# Patient Record
Sex: FEMALE | Race: WHITE | NOT HISPANIC OR LATINO | Employment: UNEMPLOYED | URBAN - METROPOLITAN AREA
[De-identification: names, ages, dates, MRNs, and addresses within clinical notes are randomized per-mention and may not be internally consistent; named-entity substitution may affect disease eponyms.]

---

## 2023-04-28 PROBLEM — N32.81 OAB (OVERACTIVE BLADDER): Status: ACTIVE | Noted: 2023-04-28

## 2023-04-28 PROBLEM — K51.90 COLITIS, ULCERATIVE (MULTI): Status: ACTIVE | Noted: 2023-04-28

## 2023-04-28 PROBLEM — E11.9 CONTROLLED TYPE 2 DIABETES MELLITUS (MULTI): Status: ACTIVE | Noted: 2023-04-28

## 2023-04-28 PROBLEM — I10 HYPERTENSION, ACCELERATED: Status: ACTIVE | Noted: 2023-04-28

## 2023-04-28 PROBLEM — H26.9 CATARACT: Status: ACTIVE | Noted: 2023-04-28

## 2023-04-28 PROBLEM — I34.0 MITRAL VALVE REGURGITATION: Status: ACTIVE | Noted: 2023-04-28

## 2023-04-28 PROBLEM — F41.9 ANXIETY: Status: ACTIVE | Noted: 2023-04-28

## 2023-04-28 PROBLEM — I10 ESSENTIAL HYPERTENSION: Status: ACTIVE | Noted: 2023-04-28

## 2023-04-28 PROBLEM — I49.9 DYSRHYTHMIA: Status: ACTIVE | Noted: 2023-04-28

## 2023-04-28 PROBLEM — E78.5 HYPERLIPIDEMIA: Status: ACTIVE | Noted: 2023-04-28

## 2023-04-28 PROBLEM — F32.A MILD DEPRESSION: Status: ACTIVE | Noted: 2023-04-28

## 2023-04-28 PROBLEM — I35.0 AORTIC STENOSIS, MILD: Status: ACTIVE | Noted: 2023-04-28

## 2023-04-28 PROBLEM — I48.0 AF (PAROXYSMAL ATRIAL FIBRILLATION) (MULTI): Status: ACTIVE | Noted: 2023-04-28

## 2023-04-28 PROBLEM — L40.9 PSORIASIS: Status: ACTIVE | Noted: 2023-04-28

## 2023-04-28 PROBLEM — R42 DIZZINESS: Status: ACTIVE | Noted: 2023-04-28

## 2023-04-28 PROBLEM — J30.9 ALLERGIC RHINITIS: Status: ACTIVE | Noted: 2023-04-28

## 2023-04-28 RX ORDER — SERTRALINE HYDROCHLORIDE 25 MG/1
1 TABLET, FILM COATED ORAL DAILY
COMMUNITY
Start: 2022-09-27 | End: 2023-07-26 | Stop reason: ALTCHOICE

## 2023-04-28 RX ORDER — ENALAPRIL MALEATE 2.5 MG/1
1 TABLET ORAL DAILY
COMMUNITY
End: 2023-07-26 | Stop reason: ALTCHOICE

## 2023-04-28 RX ORDER — SULFAMETHOXAZOLE AND TRIMETHOPRIM 400; 80 MG/1; MG/1
TABLET ORAL EVERY 12 HOURS
COMMUNITY
End: 2023-10-09 | Stop reason: SDUPTHER

## 2023-04-28 RX ORDER — ASPIRIN 325 MG
1 TABLET ORAL DAILY
COMMUNITY
Start: 2022-03-08

## 2023-04-28 RX ORDER — MECLIZINE HYDROCHLORIDE 25 MG/1
TABLET ORAL
COMMUNITY
Start: 2020-01-21

## 2023-04-28 RX ORDER — LISINOPRIL 40 MG/1
1 TABLET ORAL DAILY
COMMUNITY
Start: 2020-01-21

## 2023-04-28 RX ORDER — METOPROLOL SUCCINATE 50 MG/1
1.5 TABLET, EXTENDED RELEASE ORAL DAILY
COMMUNITY
Start: 2022-03-01 | End: 2024-01-03 | Stop reason: SDUPTHER

## 2023-04-28 RX ORDER — FLUOXETINE 10 MG/1
1 TABLET ORAL DAILY
COMMUNITY
Start: 2022-11-29 | End: 2023-07-26 | Stop reason: ALTCHOICE

## 2023-04-28 RX ORDER — ERYTHROMYCIN 5 MG/G
OINTMENT OPHTHALMIC
COMMUNITY
Start: 2023-01-19 | End: 2023-10-09 | Stop reason: SDUPTHER

## 2023-04-28 RX ORDER — BETAMETHASONE DIPROPIONATE 0.5 MG/G
1 CREAM TOPICAL 2 TIMES DAILY
COMMUNITY
Start: 2021-08-16 | End: 2023-10-09 | Stop reason: SDUPTHER

## 2023-05-11 ENCOUNTER — APPOINTMENT (OUTPATIENT)
Dept: PRIMARY CARE | Facility: CLINIC | Age: 88
End: 2023-05-11
Payer: MEDICARE

## 2023-07-26 ENCOUNTER — OFFICE VISIT (OUTPATIENT)
Dept: PRIMARY CARE | Facility: CLINIC | Age: 88
End: 2023-07-26
Payer: MEDICARE

## 2023-07-26 VITALS
WEIGHT: 136 LBS | BODY MASS INDEX: 25.68 KG/M2 | HEIGHT: 61 IN | HEART RATE: 87 BPM | TEMPERATURE: 93.5 F | OXYGEN SATURATION: 93 % | SYSTOLIC BLOOD PRESSURE: 138 MMHG | DIASTOLIC BLOOD PRESSURE: 75 MMHG

## 2023-07-26 DIAGNOSIS — E78.2 MIXED HYPERLIPIDEMIA: ICD-10-CM

## 2023-07-26 DIAGNOSIS — F32.A MILD DEPRESSION: ICD-10-CM

## 2023-07-26 DIAGNOSIS — I10 ESSENTIAL HYPERTENSION: ICD-10-CM

## 2023-07-26 DIAGNOSIS — I48.0 AF (PAROXYSMAL ATRIAL FIBRILLATION) (MULTI): Primary | ICD-10-CM

## 2023-07-26 DIAGNOSIS — K51.919 ULCERATIVE COLITIS WITH COMPLICATION, UNSPECIFIED LOCATION (MULTI): ICD-10-CM

## 2023-07-26 DIAGNOSIS — N32.81 OAB (OVERACTIVE BLADDER): ICD-10-CM

## 2023-07-26 PROCEDURE — 99214 OFFICE O/P EST MOD 30 MIN: CPT | Performed by: INTERNAL MEDICINE

## 2023-07-26 PROCEDURE — 1036F TOBACCO NON-USER: CPT | Performed by: INTERNAL MEDICINE

## 2023-07-26 PROCEDURE — 1159F MED LIST DOCD IN RCRD: CPT | Performed by: INTERNAL MEDICINE

## 2023-07-26 PROCEDURE — 1157F ADVNC CARE PLAN IN RCRD: CPT | Performed by: INTERNAL MEDICINE

## 2023-07-26 PROCEDURE — 1126F AMNT PAIN NOTED NONE PRSNT: CPT | Performed by: INTERNAL MEDICINE

## 2023-07-26 PROCEDURE — 3078F DIAST BP <80 MM HG: CPT | Performed by: INTERNAL MEDICINE

## 2023-07-26 PROCEDURE — 3075F SYST BP GE 130 - 139MM HG: CPT | Performed by: INTERNAL MEDICINE

## 2023-07-26 SDOH — ECONOMIC STABILITY: FOOD INSECURITY: WITHIN THE PAST 12 MONTHS, YOU WORRIED THAT YOUR FOOD WOULD RUN OUT BEFORE YOU GOT MONEY TO BUY MORE.: NEVER TRUE

## 2023-07-26 SDOH — ECONOMIC STABILITY: FOOD INSECURITY: WITHIN THE PAST 12 MONTHS, THE FOOD YOU BOUGHT JUST DIDN'T LAST AND YOU DIDN'T HAVE MONEY TO GET MORE.: NEVER TRUE

## 2023-07-26 ASSESSMENT — PATIENT HEALTH QUESTIONNAIRE - PHQ9
1. LITTLE INTEREST OR PLEASURE IN DOING THINGS: NOT AT ALL
2. FEELING DOWN, DEPRESSED OR HOPELESS: NOT AT ALL
SUM OF ALL RESPONSES TO PHQ9 QUESTIONS 1 & 2: 0

## 2023-07-26 ASSESSMENT — LIFESTYLE VARIABLES
SKIP TO QUESTIONS 9-10: 1
HOW OFTEN DO YOU HAVE SIX OR MORE DRINKS ON ONE OCCASION: NEVER
AUDIT-C TOTAL SCORE: 0
HOW OFTEN DO YOU HAVE A DRINK CONTAINING ALCOHOL: NEVER
HOW MANY STANDARD DRINKS CONTAINING ALCOHOL DO YOU HAVE ON A TYPICAL DAY: PATIENT DOES NOT DRINK

## 2023-07-26 ASSESSMENT — PAIN SCALES - GENERAL: PAINLEVEL: 0-NO PAIN

## 2023-07-26 ASSESSMENT — ENCOUNTER SYMPTOMS
OCCASIONAL FEELINGS OF UNSTEADINESS: 1
DEPRESSION: 0
LOSS OF SENSATION IN FEET: 0

## 2023-07-26 NOTE — PROGRESS NOTES
Subjective   Patient ID: Danika Demarco is a 89 y.o. female who presents for Follow-up (New pt to dr abraham).    HPI    Patient presents for a follow up. She was previously a patient of Dr. Gaines, last seen November 2022. Most recent lab work done April 2023.     HTN: BP in the office today is 138/75. Takes lisinopril 40 mg daily and metoprolol 75 mg daily.  She reports that she does check her blood pressure at home, but it normally runs in a pretty good range.     Atrial fibrillation: Takes metoprolol 75 mg. Last saw Dr. Ashby in October 2022, is due to follow up with him in October. She did have an episode of atrial fibrillation back in April which prompted an emergency room visit but no hospital admission. She takes a full dose ASA.     Hyperlipidemia: Stable, eats a lot of vegetables and salads. Most recent lipid panel was in August 2022, and everything was within normal limits at that time.    Overactive bladder: She did have mesh placed by Dr. Bliss over two decades ago, which worked well for awhile, but she is now soaking through pads consistently now. She is planning on asking Dr. Bliss if she can have a revision of her procedure. Patient has bladder leakage.     Depression, mild: Last visit, the patient switched from sertraline to fluoxetine 10 mg. She started having hot flashes with this medication as well, so she discontinued the medication. She is trying to manage her mood on her own, and is doing pretty well overall.      Review of Systems  In addition to that documented in the HPI above, the additional ROS was obtained:  Constitutional: Denies fevers or chills  Eyes: Denies vision changes  ENMT: Denies trouble swallowing, she gets plugged ears at times  Cardiovascular: Denies chest pain or heart racing  Respiratory: Denies SOB, does have an occasional cough usually associated with allergies or the smoke in the air  Gastrointestinal: Denies nausea, vomiting, diarrhea or  "constipation  Musculoskeletal: Denies joint pain or joint swelling  Neuro: denies frequent headaches, some dizziness when she looks up but this quickly resolves  Psych: denies anxiety, does have some depression, she is doing OK. She no longer takes med therapy.    Objective     Visit Vitals  /75 (BP Location: Right arm, Patient Position: Sitting, BP Cuff Size: Adult)   Pulse 87   Temp 34.2 °C (93.5 °F) (Temporal)   Ht 1.549 m (5' 1\")   Wt 61.7 kg (136 lb)   SpO2 93%   BMI 25.70 kg/m²   Smoking Status Never   BSA 1.63 m²      Physical Exam  Constitutional: Well developed, awake/alert/oriented x3, no distress, alert and   cooperative   Respiratory/Thorax: Patent airways, CTAB, normal breath sounds with good chest   expansion, thorax symmetric   Cardiovascular: irregularly, irregular rate and rhythm, rate is controlled, a 2/6 systolic murmur is noted over the LUSB, radiating to the carotids,  Musculoskeletal: ROM intact, no joint swelling, normal strength   Extremities: normal extremities, no cyanosis, no  edema, contusions or wounds, no   clubbing   Psychological: Appropriate mood and behavior     Health Maintenance Due   Topic Date Due    Vitamin B-12  Never done    Bone Density Scan  Never done    TB Test  Never done    Vitamin D25-OH  Never done    Hepatitis B Surface Antibody  Never done    Diabetes: Foot Exam  Never done    Diabetes: Retinopathy Screening  Never done    DTaP/Tdap/Td Vaccines (1 - Tdap) Never done    Zoster Vaccines (1 of 2) Never done    Pneumococcal Vaccine: 65+ Years (2 - PPSV23 if available, else PCV20) 12/05/2018    COVID-19 Vaccine (4 - Booster for Moderna series) 02/11/2022    Diabetes: Urine Protein Screening  08/05/2022    Diabetes: Hemoglobin A1C  11/12/2022    Lipid Panel  08/12/2023        Assessment/Plan   Problem List Items Addressed This Visit       AF (paroxysmal atrial fibrillation) (CMS/[facility]) - Primary     Follows up with cardiology, due for visit in October. Takes " metoprolol. She takes ASA also since she has a history of brain bleed, she does not take other anticoagulation therapy         Relevant Orders    TSH with reflex to Free T4 if abnormal    Vitamin D 1,25 Dihydroxy    Comprehensive Metabolic Panel    CBC and Auto Differential    Essential hypertension     Stable, continue with lisinopril and metoprolol         Relevant Orders    Albumin , Urine Random    Vitamin D 1,25 Dihydroxy    Comprehensive Metabolic Panel    CBC and Auto Differential    Hyperlipidemia     Stable, well controlled with diet. Recheck labs.          Relevant Orders    Vitamin D 1,25 Dihydroxy    Comprehensive Metabolic Panel    CBC and Auto Differential    Lipid panel    Mild depression     Could not tolerate fluoxetine so she discontinued this medication and elected to manage her mood on her own.          Relevant Orders    Vitamin D 1,25 Dihydroxy    Comprehensive Metabolic Panel    CBC and Auto Differential    OAB (overactive bladder)     Did undergo mesh placement in the past, plans on returning to that physician to discuss revision , will refer to uro gynecology for an assessment         Relevant Orders    Referral to Urogynecology    Vitamin D 1,25 Dihydroxy    Comprehensive Metabolic Panel    CBC and Auto Differential     Recheck labs. Follow up in 6 months.

## 2023-07-26 NOTE — ASSESSMENT & PLAN NOTE
Follows up with cardiology, due for visit in October. Takes metoprolol. She takes ASA also since she has a history of brain bleed, she does not take other anticoagulation therapy

## 2023-07-26 NOTE — ASSESSMENT & PLAN NOTE
Could not tolerate fluoxetine so she discontinued this medication and elected to manage her mood on her own.

## 2023-07-26 NOTE — ASSESSMENT & PLAN NOTE
Did undergo mesh placement in the past, plans on returning to that physician to discuss revision , will refer to uro gynecology for an assessment

## 2023-08-17 ENCOUNTER — TELEPHONE (OUTPATIENT)
Dept: PRIMARY CARE | Facility: CLINIC | Age: 88
End: 2023-08-17
Payer: MEDICARE

## 2023-08-17 NOTE — TELEPHONE ENCOUNTER
Patient is calling for a handicap placard script.  Patient is asking if it can be mailed to her home address.

## 2023-08-31 PROBLEM — D18.01 HEMANGIOMA OF SKIN AND SUBCUTANEOUS TISSUE: Status: ACTIVE | Noted: 2019-06-13

## 2023-08-31 PROBLEM — L71.9 ROSACEA, UNSPECIFIED: Status: ACTIVE | Noted: 2019-06-13

## 2023-08-31 PROBLEM — L30.0 NUMMULAR DERMATITIS: Status: ACTIVE | Noted: 2019-06-13

## 2023-08-31 PROBLEM — L82.1 OTHER SEBORRHEIC KERATOSIS: Status: ACTIVE | Noted: 2019-06-13

## 2023-08-31 RX ORDER — METOPROLOL SUCCINATE 25 MG/1
25 TABLET, EXTENDED RELEASE ORAL DAILY
COMMUNITY
Start: 2023-08-10 | End: 2023-10-09 | Stop reason: SDUPTHER

## 2023-08-31 RX ORDER — MINOCYCLINE HYDROCHLORIDE 50 MG/1
50 CAPSULE ORAL
COMMUNITY
Start: 2016-11-14 | End: 2024-01-29 | Stop reason: WASHOUT

## 2023-09-29 LAB — URINE CULTURE: ABNORMAL

## 2023-10-03 NOTE — PATIENT INSTRUCTIONS
Continue all current medications as prescribed. Refills have been sent to your pharmacy as per your request.  Followup with Dr. Ashby in 1 year, sooner should any issues or concerns arise before then.     If you have any questions or cardiac concerns, please call our office at [phone].

## 2023-10-03 NOTE — PROGRESS NOTES
"Counseling:  The patient was counseled regarding diagnostic results, instructions for management, risk factor reductions, prognosis, patient and family education, impressions, risks and benefits of treatment options and importance of compliance with treatment.      Chief Complaint:   The patient presents today for 6-month followup of a-fib.      History Of Present Illness:    Danika Demarco is a 89 year old female patient who presents today for 6-month followup of a-fib. Her PMH is significant for anxiety, mild aortic stenosis, ulcerative colitis, DM type 2, HTN, hyperlipidemia, atrial fibrillation, and h/o traumatic subdural hematoma after a fall. Over the past 6 months, the patient states that she has done well from a cardiac standpoint. She denies any CP, chest discomfort, SOB or palpitations. Her only complaint is that of age-related fatigue/tiredness. The patient is compliant with her prescribed medications.      Last Recorded Vitals:  Vitals:    10/09/23 1431   BP: 120/78   BP Location: Right arm   Pulse: 78   Weight: 63.5 kg (140 lb)   Height: 1.575 m (5' 2\")     Past Surgical History:  She has a past surgical history that includes Other surgical history (01/21/2020); Other surgical history (01/21/2020); Other surgical history (01/21/2020); Other surgical history (01/21/2020); Other surgical history (01/21/2020); Other surgical history (01/21/2020); and Other surgical history (01/21/2020).      Social History:  She reports that she has never smoked. She has never used smokeless tobacco. She reports that she does not drink alcohol and does not use drugs.    Family History:  Family History   Problem Relation Name Age of Onset    Breast cancer Sister      Diabetes Brother      Prostate cancer Brother         Allergies:  Patient has no known allergies.    Outpatient Medications:  Current Outpatient Medications   Medication Instructions    aspirin 325 mg tablet 1 tablet, oral, Daily    lisinopril 40 mg tablet 1 tablet, " oral, Daily    meclizine (Antivert) 25 mg tablet oral    metoprolol succinate XL (Toprol-XL) 50 mg 24 hr tablet 1.5 tablets, oral, Daily    metoprolol succinate XL (TOPROL-XL) 25 mg, oral, Daily    minocycline 50 mg, oral    vibegron 75 mg, oral, Daily PRN     Review of Systems   Constitutional: Positive for malaise/fatigue.   All other systems reviewed and are negative.     Physical Exam:  Constitutional:       Appearance: Healthy appearance. Not in distress.   Neck:      Vascular: No JVR. JVD normal.   Pulmonary:      Effort: Pulmonary effort is normal.      Breath sounds: Normal breath sounds. No wheezing. No rhonchi. No rales.   Chest:      Chest wall: Not tender to palpatation.   Cardiovascular:      PMI at left midclavicular line. Normal rate. Regular rhythm. Normal S1. Normal S2.       Murmurs: There is no murmur.      No gallop.  No click. No rub.   Pulses:     Intact distal pulses.   Edema:     Peripheral edema absent.   Abdominal:      General: Bowel sounds are normal.      Palpations: Abdomen is soft.      Tenderness: There is no abdominal tenderness.   Musculoskeletal: Normal range of motion.         General: No tenderness. Skin:     General: Skin is warm and dry.   Neurological:      General: No focal deficit present.      Mental Status: Alert and oriented to person, place and time.       Last Labs:  CBC -  Lab Results   Component Value Date    WBC 8.2 04/19/2023    HGB 14.2 04/19/2023    HCT 42.3 04/19/2023    MCV 93 04/19/2023     04/19/2023       CMP -  Lab Results   Component Value Date    CALCIUM 9.3 04/19/2023    PROT 7.4 04/19/2023    ALBUMIN 3.8 04/19/2023    AST 25 04/19/2023    ALT 10 04/19/2023    ALKPHOS 53 04/19/2023    BILITOT 0.9 04/19/2023       LIPID PANEL -   Lab Results   Component Value Date    CHOL 123 08/12/2022    TRIG 131 08/12/2022    HDL 41.1 08/12/2022    CHHDL 3.0 08/12/2022    LDLF 56 08/12/2022    VLDL 26 08/12/2022    NHDL 116 08/05/2020       RENAL FUNCTION PANEL -    Lab Results   Component Value Date    GLUCOSE 130 (H) 04/19/2023     04/19/2023    K 4.5 04/19/2023     04/19/2023    CO2 22 04/19/2023    ANIONGAP 16 04/19/2023    BUN 17 04/19/2023    CREATININE 0.72 04/19/2023    CALCIUM 9.3 04/19/2023    ALBUMIN 3.8 04/19/2023        Lab Results   Component Value Date     (H) 04/19/2023    HGBA1C 6.3 (A) 08/12/2022       Last Cardiology Tests:  05/11/2023 - TTE  1. Left ventricular systolic function is normal with a 60-65% estimated ejection fraction.  2. There is reduced right ventricular systolic function.  3. The left atrium is severely dilated.  4. Moderate mitral valve regurgitation.  5. Mildly elevated RVSP.  6. Moderate tricuspid regurgitation.  7. Mild aortic valve stenosis.    05/05/2022 - TTE  1. The left ventricular systolic function is normal with a 60-65% estimated ejection fraction.  2. There is moderate concentric left ventricular hypertrophy.  3. The left atrium is severely dilated.  4. Mild to moderate mitral valve regurgitation.  5. Mildly elevated RVSP.  6. Mild aortic valve stenosis.  7. There is moderate aortic valve cusp calcification.     Assessment/Plan   1) Atrial Fibrillation  Recently in the ED due to A. fib with RVR and heart rate improved after one-time dose of metoprolol  Not on anticoagulation due to history of subdural hematoma  Continue metoprolol succinate 75 mg daily   D/W patient about risks of stroke vs subdural hematoma  Wishes to remain off anticoagulation for now  Plan is for rate control approach  Stable    2) Aortic valve stenosis  Mild AS on TTE May 2022  Mild AS on TTE May 2023      3) Mitral valve regurgitation  Mild to moderate MR on TTE May 2022  Moderate MR on TTE May 2023     4) HTN  Stable  Continue current antihypertensives: lisinopril 40 mg daily, metoprolol succinate 75 mg   Script previously given for new BP cuff      Scribe Attestation  By signing my name below, I, Karly Ott   attest that  this documentation has been prepared under the direction and in the presence of Milton Ashby MD.

## 2023-10-09 ENCOUNTER — OFFICE VISIT (OUTPATIENT)
Dept: CARDIOLOGY | Facility: CLINIC | Age: 88
End: 2023-10-09
Payer: MEDICARE

## 2023-10-09 VITALS
WEIGHT: 140 LBS | HEART RATE: 78 BPM | SYSTOLIC BLOOD PRESSURE: 120 MMHG | HEIGHT: 62 IN | DIASTOLIC BLOOD PRESSURE: 78 MMHG | BODY MASS INDEX: 25.76 KG/M2

## 2023-10-09 DIAGNOSIS — I48.0 AF (PAROXYSMAL ATRIAL FIBRILLATION) (MULTI): Primary | ICD-10-CM

## 2023-10-09 PROCEDURE — 3074F SYST BP LT 130 MM HG: CPT | Performed by: INTERNAL MEDICINE

## 2023-10-09 PROCEDURE — 1159F MED LIST DOCD IN RCRD: CPT | Performed by: INTERNAL MEDICINE

## 2023-10-09 PROCEDURE — 1126F AMNT PAIN NOTED NONE PRSNT: CPT | Performed by: INTERNAL MEDICINE

## 2023-10-09 PROCEDURE — 1036F TOBACCO NON-USER: CPT | Performed by: INTERNAL MEDICINE

## 2023-10-09 PROCEDURE — 1160F RVW MEDS BY RX/DR IN RCRD: CPT | Performed by: INTERNAL MEDICINE

## 2023-10-09 PROCEDURE — 99212 OFFICE O/P EST SF 10 MIN: CPT | Performed by: INTERNAL MEDICINE

## 2023-10-09 PROCEDURE — 3078F DIAST BP <80 MM HG: CPT | Performed by: INTERNAL MEDICINE

## 2023-10-09 PROCEDURE — 93000 ELECTROCARDIOGRAM COMPLETE: CPT | Performed by: INTERNAL MEDICINE

## 2023-10-09 RX ORDER — METOPROLOL SUCCINATE 25 MG/1
25 TABLET, EXTENDED RELEASE ORAL DAILY
Qty: 90 TABLET | Refills: 3 | Status: SHIPPED | OUTPATIENT
Start: 2023-10-09 | End: 2024-01-03 | Stop reason: SDUPTHER

## 2023-10-09 ASSESSMENT — ENCOUNTER SYMPTOMS
OCCASIONAL FEELINGS OF UNSTEADINESS: 0
LOSS OF SENSATION IN FEET: 0
DEPRESSION: 0

## 2023-10-09 NOTE — LETTER
"October 9, 2023     David Mcdonald MD  [address] [facility] [address]  [facility] [address]    Patient: Danika Demarco   YOB: 1930   Date of Visit: 10/9/2023       Dear Dr. David Mcdonald MD:    Thank you for referring Danika Demarco to me for evaluation. Below are my notes for this consultation.  If you have questions, please do not hesitate to call me. I look forward to following your patient along with you.       Sincerely,     Milton Ashby MD      CC: No Recipients  ______________________________________________________________________________________    Counseling:  The patient was counseled regarding diagnostic results, instructions for management, risk factor reductions, prognosis, patient and family education, impressions, risks and benefits of treatment options and importance of compliance with treatment.      Chief Complaint:   The patient presents today for 6-month followup of a-fib.      History Of Present Illness:    Danika Demarco is a 89 year old female patient who presents today for 6-month followup of a-fib. Her PMH is significant for anxiety, mild aortic stenosis, ulcerative colitis, DM type 2, HTN, hyperlipidemia, atrial fibrillation, and h/o traumatic subdural hematoma after a fall. Over the past 6 months, the patient states that she has done well from a cardiac standpoint. She denies any CP, chest discomfort, SOB or palpitations. Her only complaint is that of age-related fatigue/tiredness. The patient is compliant with her prescribed medications.      Last Recorded Vitals:  Vitals:    10/09/23 1431   BP: 120/78   BP Location: Right arm   Pulse: 78   Weight: 63.5 kg (140 lb)   Height: 1.575 m (5' 2\")     Past Surgical History:  She has a past surgical history that includes Other surgical history (01/21/2020); Other surgical history (01/21/2020); Other surgical history (01/21/2020); Other surgical history (01/21/2020); Other surgical history (01/21/2020); Other surgical history (01/21/2020); and " Other surgical history (01/21/2020).      Social History:  She reports that she has never smoked. She has never used smokeless tobacco. She reports that she does not drink alcohol and does not use drugs.    Family History:  Family History   Problem Relation Name Age of Onset   • Breast cancer Sister     • Diabetes Brother     • Prostate cancer Brother         Allergies:  Patient has no known allergies.    Outpatient Medications:  Current Outpatient Medications   Medication Instructions   • aspirin 325 mg tablet 1 tablet, oral, Daily   • lisinopril 40 mg tablet 1 tablet, oral, Daily   • meclizine (Antivert) 25 mg tablet oral   • metoprolol succinate XL (Toprol-XL) 50 mg 24 hr tablet 1.5 tablets, oral, Daily   • metoprolol succinate XL (TOPROL-XL) 25 mg, oral, Daily   • minocycline 50 mg, oral   • vibegron 75 mg, oral, Daily PRN     Review of Systems   Constitutional: Positive for malaise/fatigue.   All other systems reviewed and are negative.     Physical Exam:  Constitutional:       Appearance: Healthy appearance. Not in distress.   Neck:      Vascular: No JVR. JVD normal.   Pulmonary:      Effort: Pulmonary effort is normal.      Breath sounds: Normal breath sounds. No wheezing. No rhonchi. No rales.   Chest:      Chest wall: Not tender to palpatation.   Cardiovascular:      PMI at left midclavicular line. Normal rate. Regular rhythm. Normal S1. Normal S2.       Murmurs: There is no murmur.      No gallop.  No click. No rub.   Pulses:     Intact distal pulses.   Edema:     Peripheral edema absent.   Abdominal:      General: Bowel sounds are normal.      Palpations: Abdomen is soft.      Tenderness: There is no abdominal tenderness.   Musculoskeletal: Normal range of motion.         General: No tenderness. Skin:     General: Skin is warm and dry.   Neurological:      General: No focal deficit present.      Mental Status: Alert and oriented to person, place and time.       Last Labs:  CBC -  Lab Results   Component  Value Date    WBC 8.2 04/19/2023    HGB 14.2 04/19/2023    HCT 42.3 04/19/2023    MCV 93 04/19/2023     04/19/2023       CMP -  Lab Results   Component Value Date    CALCIUM 9.3 04/19/2023    PROT 7.4 04/19/2023    ALBUMIN 3.8 04/19/2023    AST 25 04/19/2023    ALT 10 04/19/2023    ALKPHOS 53 04/19/2023    BILITOT 0.9 04/19/2023       LIPID PANEL -   Lab Results   Component Value Date    CHOL 123 08/12/2022    TRIG 131 08/12/2022    HDL 41.1 08/12/2022    CHHDL 3.0 08/12/2022    LDLF 56 08/12/2022    VLDL 26 08/12/2022    NHDL 116 08/05/2020       RENAL FUNCTION PANEL -   Lab Results   Component Value Date    GLUCOSE 130 (H) 04/19/2023     04/19/2023    K 4.5 04/19/2023     04/19/2023    CO2 22 04/19/2023    ANIONGAP 16 04/19/2023    BUN 17 04/19/2023    CREATININE 0.72 04/19/2023    CALCIUM 9.3 04/19/2023    ALBUMIN 3.8 04/19/2023        Lab Results   Component Value Date     (H) 04/19/2023    HGBA1C 6.3 (A) 08/12/2022       Last Cardiology Tests:  05/11/2023 - TTE  1. Left ventricular systolic function is normal with a 60-65% estimated ejection fraction.  2. There is reduced right ventricular systolic function.  3. The left atrium is severely dilated.  4. Moderate mitral valve regurgitation.  5. Mildly elevated RVSP.  6. Moderate tricuspid regurgitation.  7. Mild aortic valve stenosis.    05/05/2022 - TTE  1. The left ventricular systolic function is normal with a 60-65% estimated ejection fraction.  2. There is moderate concentric left ventricular hypertrophy.  3. The left atrium is severely dilated.  4. Mild to moderate mitral valve regurgitation.  5. Mildly elevated RVSP.  6. Mild aortic valve stenosis.  7. There is moderate aortic valve cusp calcification.     Assessment/Plan  1) Atrial Fibrillation  Recently in the ED due to A. fib with RVR and heart rate improved after one-time dose of metoprolol  Not on anticoagulation due to history of subdural hematoma  Continue metoprolol  succinate 75 mg daily   D/W patient about risks of stroke vs subdural hematoma  Wishes to remain off anticoagulation for now  Plan is for rate control approach  Stable    2) Aortic valve stenosis  Mild AS on TTE May 2022  Mild AS on TTE May 2023      3) Mitral valve regurgitation  Mild to moderate MR on TTE May 2022  Moderate MR on TTE May 2023     4) HTN  Stable  Continue current antihypertensives: lisinopril 40 mg daily, metoprolol succinate 75 mg   Script previously given for new BP cuff      Scribe Attestation  By signing my name below, I, Karly Ott   attest that this documentation has been prepared under the direction and in the presence of Milton Ashby MD.

## 2023-10-18 ENCOUNTER — LAB (OUTPATIENT)
Dept: LAB | Facility: LAB | Age: 88
End: 2023-10-18
Payer: MEDICARE

## 2023-10-18 DIAGNOSIS — F32.A MILD DEPRESSION: ICD-10-CM

## 2023-10-18 DIAGNOSIS — K51.919 ULCERATIVE COLITIS WITH COMPLICATION, UNSPECIFIED LOCATION (MULTI): ICD-10-CM

## 2023-10-18 DIAGNOSIS — N32.81 OAB (OVERACTIVE BLADDER): ICD-10-CM

## 2023-10-18 DIAGNOSIS — I10 ESSENTIAL HYPERTENSION: ICD-10-CM

## 2023-10-18 DIAGNOSIS — E78.2 MIXED HYPERLIPIDEMIA: ICD-10-CM

## 2023-10-18 DIAGNOSIS — I48.0 AF (PAROXYSMAL ATRIAL FIBRILLATION) (MULTI): ICD-10-CM

## 2023-10-18 LAB
ALBUMIN SERPL BCP-MCNC: 3.8 G/DL (ref 3.4–5)
ALP SERPL-CCNC: 60 U/L (ref 33–136)
ALT SERPL W P-5'-P-CCNC: 11 U/L (ref 7–45)
ANION GAP SERPL CALC-SCNC: 12 MMOL/L (ref 10–20)
AST SERPL W P-5'-P-CCNC: 18 U/L (ref 9–39)
BASOPHILS # BLD AUTO: 0.06 X10*3/UL (ref 0–0.1)
BASOPHILS NFR BLD AUTO: 0.7 %
BILIRUB SERPL-MCNC: 0.9 MG/DL (ref 0–1.2)
BUN SERPL-MCNC: 19 MG/DL (ref 6–23)
CALCIUM SERPL-MCNC: 9.4 MG/DL (ref 8.6–10.3)
CHLORIDE SERPL-SCNC: 106 MMOL/L (ref 98–107)
CHOLEST SERPL-MCNC: 136 MG/DL (ref 0–199)
CHOLESTEROL/HDL RATIO: 3.3
CO2 SERPL-SCNC: 26 MMOL/L (ref 21–32)
CREAT SERPL-MCNC: 0.74 MG/DL (ref 0.5–1.05)
EOSINOPHIL # BLD AUTO: 0.21 X10*3/UL (ref 0–0.4)
EOSINOPHIL NFR BLD AUTO: 2.3 %
ERYTHROCYTE [DISTWIDTH] IN BLOOD BY AUTOMATED COUNT: 14.2 % (ref 11.5–14.5)
GFR SERPL CREATININE-BSD FRML MDRD: 76 ML/MIN/1.73M*2
GLUCOSE SERPL-MCNC: 123 MG/DL (ref 74–99)
HCT VFR BLD AUTO: 47.2 % (ref 36–46)
HDLC SERPL-MCNC: 40.8 MG/DL
HGB BLD-MCNC: 15 G/DL (ref 12–16)
IMM GRANULOCYTES # BLD AUTO: 0.03 X10*3/UL (ref 0–0.5)
IMM GRANULOCYTES NFR BLD AUTO: 0.3 % (ref 0–0.9)
LDLC SERPL CALC-MCNC: 54 MG/DL
LYMPHOCYTES # BLD AUTO: 2.59 X10*3/UL (ref 0.8–3)
LYMPHOCYTES NFR BLD AUTO: 28.3 %
MCH RBC QN AUTO: 30.3 PG (ref 26–34)
MCHC RBC AUTO-ENTMCNC: 31.8 G/DL (ref 32–36)
MCV RBC AUTO: 95 FL (ref 80–100)
MONOCYTES # BLD AUTO: 0.7 X10*3/UL (ref 0.05–0.8)
MONOCYTES NFR BLD AUTO: 7.7 %
NEUTROPHILS # BLD AUTO: 5.55 X10*3/UL (ref 1.6–5.5)
NEUTROPHILS NFR BLD AUTO: 60.7 %
NON HDL CHOLESTEROL: 95 MG/DL (ref 0–149)
NRBC BLD-RTO: 0 /100 WBCS (ref 0–0)
PLATELET # BLD AUTO: 307 X10*3/UL (ref 150–450)
PMV BLD AUTO: 10.7 FL (ref 7.5–11.5)
POTASSIUM SERPL-SCNC: 3.9 MMOL/L (ref 3.5–5.3)
PROT SERPL-MCNC: 7.1 G/DL (ref 6.4–8.2)
RBC # BLD AUTO: 4.95 X10*6/UL (ref 4–5.2)
SODIUM SERPL-SCNC: 140 MMOL/L (ref 136–145)
TRIGL SERPL-MCNC: 208 MG/DL (ref 0–149)
TSH SERPL-ACNC: 3.93 MIU/L (ref 0.44–3.98)
VIT B12 SERPL-MCNC: 282 PG/ML (ref 211–911)
VLDL: 42 MG/DL (ref 0–40)
WBC # BLD AUTO: 9.1 X10*3/UL (ref 4.4–11.3)

## 2023-10-18 PROCEDURE — 80053 COMPREHEN METABOLIC PANEL: CPT

## 2023-10-18 PROCEDURE — 36415 COLL VENOUS BLD VENIPUNCTURE: CPT

## 2023-10-18 PROCEDURE — 82652 VIT D 1 25-DIHYDROXY: CPT

## 2023-10-18 PROCEDURE — 82607 VITAMIN B-12: CPT

## 2023-10-18 PROCEDURE — 85025 COMPLETE CBC W/AUTO DIFF WBC: CPT

## 2023-10-18 PROCEDURE — 80061 LIPID PANEL: CPT

## 2023-10-18 PROCEDURE — 84443 ASSAY THYROID STIM HORMONE: CPT

## 2023-10-19 LAB — 1,25(OH)2D3 SERPL-MCNC: 50.4 PG/ML (ref 19.9–79.3)

## 2023-12-19 ENCOUNTER — TELEMEDICINE (OUTPATIENT)
Dept: UROLOGY | Facility: CLINIC | Age: 88
End: 2023-12-19
Payer: MEDICARE

## 2023-12-19 DIAGNOSIS — N32.81 OAB (OVERACTIVE BLADDER): Primary | ICD-10-CM

## 2023-12-19 DIAGNOSIS — N39.0 RECURRENT UTI: ICD-10-CM

## 2023-12-19 PROCEDURE — 99442 PR PHYS/QHP TELEPHONE EVALUATION 11-20 MIN: CPT | Performed by: STUDENT IN AN ORGANIZED HEALTH CARE EDUCATION/TRAINING PROGRAM

## 2023-12-30 ENCOUNTER — DOCUMENTATION (OUTPATIENT)
Dept: CARDIOLOGY | Facility: HOSPITAL | Age: 88
End: 2023-12-30
Payer: MEDICARE

## 2023-12-30 NOTE — PROGRESS NOTES
Rec'd page from answering service.  Patient ran out of 50mg metoprolol pills she borrowed from a friend.  Tells me she is normally supposed to take metoprolol succinate 75mg daily - this was verified per documentation in Dr. Ashby's note 10/9/23.      I instructed her to instead take 3 pills of the 25mg metoprolol succinate to make 75mg daily, and to call office on Tuesday to discuss further - she appears to be borrowing meds from friends and was considering borrowing metoprolol from her  - I believe a more in depth discussion and explanation of med administration is required for this patient.

## 2024-01-03 ENCOUNTER — DOCUMENTATION (OUTPATIENT)
Dept: UROLOGY | Facility: CLINIC | Age: 89
End: 2024-01-03
Payer: MEDICARE

## 2024-01-03 DIAGNOSIS — I48.0 AF (PAROXYSMAL ATRIAL FIBRILLATION) (MULTI): ICD-10-CM

## 2024-01-03 RX ORDER — METOPROLOL SUCCINATE 25 MG/1
25 TABLET, EXTENDED RELEASE ORAL DAILY
Qty: 90 TABLET | Refills: 3 | Status: SHIPPED | OUTPATIENT
Start: 2024-01-03 | End: 2025-01-02

## 2024-01-03 RX ORDER — METOPROLOL SUCCINATE 50 MG/1
50 TABLET, EXTENDED RELEASE ORAL DAILY
Qty: 90 TABLET | Refills: 3 | Status: SHIPPED | OUTPATIENT
Start: 2024-01-03 | End: 2025-01-02

## 2024-01-03 NOTE — TELEPHONE ENCOUNTER
----- Message from Coreen Renee sent at 1/2/2024  9:50 AM EST -----  Regarding: Med Refill  Patient called for refill of Metoprolol succinate 50 mg Daily.  Please send to [facility].

## 2024-01-03 NOTE — TELEPHONE ENCOUNTER
I called and spoke with patient and confirmed with her she normally takes Metoprolol Succ 50 mg tab + 25 mg tab for total of 75 mg once daily.   Her friend passed away and was given Metoprolol Succinate 100mg tabs and was splitting those for quite some time, but she ran out of the 50 mgs and has been taking 3 tabs of 25 mg, so running out of the 25 mg early.    Last appointment: 10/9/23 with Dr. Ashby   Next appointment: 10/11/24 with Dr. Ashby     5/15/23 saw Marilyn Nicole

## 2024-01-29 ENCOUNTER — OFFICE VISIT (OUTPATIENT)
Dept: PRIMARY CARE | Facility: CLINIC | Age: 89
End: 2024-01-29
Payer: MEDICARE

## 2024-01-29 VITALS
TEMPERATURE: 97.3 F | SYSTOLIC BLOOD PRESSURE: 121 MMHG | BODY MASS INDEX: 27.06 KG/M2 | HEIGHT: 61 IN | DIASTOLIC BLOOD PRESSURE: 84 MMHG | OXYGEN SATURATION: 98 % | HEART RATE: 90 BPM | WEIGHT: 143.3 LBS | RESPIRATION RATE: 20 BRPM

## 2024-01-29 DIAGNOSIS — E78.2 MIXED HYPERLIPIDEMIA: ICD-10-CM

## 2024-01-29 DIAGNOSIS — E11.9 CONTROLLED TYPE 2 DIABETES MELLITUS WITHOUT COMPLICATION, WITHOUT LONG-TERM CURRENT USE OF INSULIN (MULTI): ICD-10-CM

## 2024-01-29 DIAGNOSIS — K51.919 ULCERATIVE COLITIS WITH COMPLICATION, UNSPECIFIED LOCATION (MULTI): ICD-10-CM

## 2024-01-29 DIAGNOSIS — M81.0 POSTMENOPAUSAL BONE LOSS: ICD-10-CM

## 2024-01-29 DIAGNOSIS — I10 ESSENTIAL HYPERTENSION: ICD-10-CM

## 2024-01-29 DIAGNOSIS — I48.0 AF (PAROXYSMAL ATRIAL FIBRILLATION) (MULTI): ICD-10-CM

## 2024-01-29 DIAGNOSIS — Z00.00 ROUTINE GENERAL MEDICAL EXAMINATION AT HEALTH CARE FACILITY: Primary | ICD-10-CM

## 2024-01-29 PROCEDURE — 90471 IMMUNIZATION ADMIN: CPT | Performed by: INTERNAL MEDICINE

## 2024-01-29 PROCEDURE — 1036F TOBACCO NON-USER: CPT | Performed by: INTERNAL MEDICINE

## 2024-01-29 PROCEDURE — 1159F MED LIST DOCD IN RCRD: CPT | Performed by: INTERNAL MEDICINE

## 2024-01-29 PROCEDURE — 1160F RVW MEDS BY RX/DR IN RCRD: CPT | Performed by: INTERNAL MEDICINE

## 2024-01-29 PROCEDURE — 99214 OFFICE O/P EST MOD 30 MIN: CPT | Performed by: INTERNAL MEDICINE

## 2024-01-29 PROCEDURE — 1170F FXNL STATUS ASSESSED: CPT | Performed by: INTERNAL MEDICINE

## 2024-01-29 PROCEDURE — G0009 ADMIN PNEUMOCOCCAL VACCINE: HCPCS | Performed by: INTERNAL MEDICINE

## 2024-01-29 PROCEDURE — 90715 TDAP VACCINE 7 YRS/> IM: CPT | Performed by: INTERNAL MEDICINE

## 2024-01-29 PROCEDURE — G0439 PPPS, SUBSEQ VISIT: HCPCS | Performed by: INTERNAL MEDICINE

## 2024-01-29 PROCEDURE — 3074F SYST BP LT 130 MM HG: CPT | Performed by: INTERNAL MEDICINE

## 2024-01-29 PROCEDURE — 1157F ADVNC CARE PLAN IN RCRD: CPT | Performed by: INTERNAL MEDICINE

## 2024-01-29 PROCEDURE — 1126F AMNT PAIN NOTED NONE PRSNT: CPT | Performed by: INTERNAL MEDICINE

## 2024-01-29 PROCEDURE — 3079F DIAST BP 80-89 MM HG: CPT | Performed by: INTERNAL MEDICINE

## 2024-01-29 PROCEDURE — 90677 PCV20 VACCINE IM: CPT | Performed by: INTERNAL MEDICINE

## 2024-01-29 SDOH — ECONOMIC STABILITY: FOOD INSECURITY: WITHIN THE PAST 12 MONTHS, YOU WORRIED THAT YOUR FOOD WOULD RUN OUT BEFORE YOU GOT MONEY TO BUY MORE.: NEVER TRUE

## 2024-01-29 SDOH — ECONOMIC STABILITY: FOOD INSECURITY: WITHIN THE PAST 12 MONTHS, THE FOOD YOU BOUGHT JUST DIDN'T LAST AND YOU DIDN'T HAVE MONEY TO GET MORE.: NEVER TRUE

## 2024-01-29 ASSESSMENT — ACTIVITIES OF DAILY LIVING (ADL)
MANAGING_FINANCES: INDEPENDENT
DOING_HOUSEWORK: INDEPENDENT
GROCERY_SHOPPING: INDEPENDENT
DRESSING: INDEPENDENT
TAKING_MEDICATION: INDEPENDENT
BATHING: INDEPENDENT

## 2024-01-29 ASSESSMENT — ENCOUNTER SYMPTOMS
OCCASIONAL FEELINGS OF UNSTEADINESS: 0
LOSS OF SENSATION IN FEET: 0
DEPRESSION: 0

## 2024-01-29 ASSESSMENT — LIFESTYLE VARIABLES
SKIP TO QUESTIONS 9-10: 1
HOW OFTEN DO YOU HAVE A DRINK CONTAINING ALCOHOL: NEVER
AUDIT-C TOTAL SCORE: 0
HOW OFTEN DO YOU HAVE SIX OR MORE DRINKS ON ONE OCCASION: NEVER
HOW MANY STANDARD DRINKS CONTAINING ALCOHOL DO YOU HAVE ON A TYPICAL DAY: PATIENT DOES NOT DRINK

## 2024-01-29 ASSESSMENT — PAIN SCALES - GENERAL: PAINLEVEL: 0-NO PAIN

## 2024-01-29 NOTE — PROGRESS NOTES
"Subjective   Reason for Visit: Danika Demarco is an 89 y.o. female here for a Medicare Wellness visit.     Past Medical, Surgical, and Family History reviewed and updated in chart.    Reviewed all medications by prescribing practitioner or clinical pharmacist (such as prescriptions, OTCs, herbal therapies and supplements) and documented in the medical record.    [facility]    Follow up and Medicare wellness exam    DM type 2: patient takes no med therapy, checks glucoses once daily, glucoses are generally well controlled    HTN:  Takes lisinopril 40 mg daily and metoprolol 75 mg daily.  Patient used to have symptoms of vertigo, she has not had any symptoms      Atrial fibrillation: Takes metoprolol 75 mg. Last saw Dr. Ashby in October 2022, is due to follow up with him in October. She did have an episode of atrial fibrillation back in April which prompted an emergency room visit but no hospital admission. She takes a full dose ASA. She has a history of intracerebral bleed requiring drainage. She does not take other anticoagulant therapies     Hyperlipidemia: Stable, eats a lot of vegetables and salads.      Overactive bladder: patient now takes Gemtesa daily, patient sees Dr García, the med is very effective. Patient is very pleased with current treatment, she has been getting sample meds from Dr García     Depression, mild: she takes no med therapy now     Patient Care Team:  David Mcdonald MD as PCP - General (Internal Medicine)  Carlie Alvarado MD as PCP - [facility] [employer] PCP  David Mcdonald MD as Referring Physician (Internal Medicine)     Review of Systems    documented in the HPI above,     Objective   Vitals:  /84 (BP Location: Right arm, Patient Position: Sitting, BP Cuff Size: Adult)   Pulse 90   Temp 36.3 °C (97.3 °F)   Resp 20   Ht 1.549 m (5' 1\")   Wt 65 kg (143 lb 4.8 oz)   SpO2 98%   BMI 27.08 kg/m²       Physical Exam    Constitutional: Well developed, awake/alert/oriented " x3, no distress, alert and   cooperative , accompanied by   HEENT: auricles intact, no thyroid masses, no neck masses  Respiratory/Thorax: Patent airways, CTAB, normal breath sounds with good chest   expansion, thorax symmetric   Cardiovascular: irregularly, irregular rate and rhythm, rate is controlled, a 2/6 systolic murmur is noted over the LUSB, radiating to the carotids, this is not new  Musculoskeletal: ROM intact, no joint swelling, normal strength   Extremities: normal extremities, no cyanosis, no  edema, contusions or wounds, no   clubbing   Psychological: Appropriate mood and behavior     Assessment/Plan   Problem List Items Addressed This Visit       AF (paroxysmal atrial fibrillation) (CMS/[facility])    Current Assessment & Plan     Takes ASA only, follow up with cardiology as ordered         Relevant Orders    CBC and Auto Differential    Comprehensive Metabolic Panel    Colitis, ulcerative (CMS/[facility])    Controlled type 2 diabetes mellitus (CMS/[facility])    Current Assessment & Plan     Takes no med therapy, check labs as ordered         Relevant Orders    Albumin , Urine Random    CBC and Auto Differential    Comprehensive Metabolic Panel    Hemoglobin A1C    Essential hypertension    Current Assessment & Plan     BP is controlled, no med changes         Relevant Orders    Albumin , Urine Random    CBC and Auto Differential    Comprehensive Metabolic Panel    Hyperlipidemia    Current Assessment & Plan     LDL is stable, patient has elevated TG level, will monitor, she is 89 years old and she takes no med therapy         Relevant Orders    CBC and Auto Differential    Comprehensive Metabolic Panel    Lipid Panel    Routine general medical examination at health care facility - Primary    Current Assessment & Plan     Prevnar 20 and TDAP ordered, DEXA scan deferred per patient request         Relevant Orders    CBC and Auto Differential    Comprehensive Metabolic Panel     Other Visit Diagnoses        Postmenopausal bone loss        Relevant Orders    Vitamin D 25-Hydroxy,Total (for eval of Vitamin D levels)          Medicare wellness exam completed, may follow up in 6 months, check labs as ordered, Prevnar 20 and TDAP ordered

## 2024-01-29 NOTE — ASSESSMENT & PLAN NOTE
LDL is stable, patient has elevated TG level, will monitor, she is 89 years old and she takes no med therapy

## 2024-03-01 NOTE — PROGRESS NOTES
[facility] [place]  Outpatient Clinic Note    Patient: Danika Demarco  Age/Sex: 89 y.o., female  MRN: [id number]    Chief Complaint:  3 month medication follow up         History of Present Illness  This is a 89 y.o. female, presents to the office for 3 month medication follow up with her son. She is currently taking Gemtessa stating it has improved her OAB symptoms. She has not been getting up at night, she thrilled about sleeping through the night and she is only using one very thin pad per day.  She is giving us a urine sample today, she stated she is experiencing some dysuria with urination. She denies gross hematuria, back pain, flank pain, pelvic pressure, fever or chills.     Past Medical & Surgical History  Past Medical History:   Diagnosis Date    Abnormal levels of other serum enzymes     Elevated liver enzymes    Personal history of other (healed) physical injury and trauma     History of traumatic subdural hematoma    Personal history of other diseases of urinary system     History of hematuria    Personal history of urinary (tract) infections     History of urinary tract infection    Urinary bladder incontinence 09/25/2023     Past Surgical History:   Procedure Laterality Date    OTHER SURGICAL HISTORY  01/21/2020    Bladder surgery    OTHER SURGICAL HISTORY  01/21/2020    Hysterectomy    OTHER SURGICAL HISTORY  01/21/2020    Craniotomy    OTHER SURGICAL HISTORY  01/21/2020    Varicose vein ligation    OTHER SURGICAL HISTORY  01/21/2020    Cholecystectomy    OTHER SURGICAL HISTORY  01/21/2020    Hernia repair    OTHER SURGICAL HISTORY  01/21/2020    Oophorectomy bilateral       Family History  Family History   Problem Relation Name Age of Onset    Breast cancer Sister      Diabetes Brother      Prostate cancer Brother         Social History  She reports that she has never smoked. She has never been exposed to tobacco smoke. She has never used smokeless tobacco. She reports current alcohol use. She reports  that she does not use drugs.    Allergies  Patient has no known allergies.    Medications:  Current Outpatient Medications on File Prior to Visit   Medication Sig Dispense Refill    aspirin 325 mg tablet Take 1 tablet (325 mg) by mouth once daily.      lisinopril 40 mg tablet Take 1 tablet (40 mg) by mouth once daily.      meclizine (Antivert) 25 mg tablet Take by mouth.      metoprolol succinate XL (Toprol-XL) 25 mg 24 hr tablet Take 1 tablet (25 mg) by mouth once daily. With 50 mg tablet once daily for total of 75 mg daily 90 tablet 3    metoprolol succinate XL (Toprol-XL) 50 mg 24 hr tablet Take 1 tablet (50 mg) by mouth once daily. With 25 mg tablet once daily for total of 75 mg daily 90 tablet 3    vibegron 75 mg tablet Take 1 tablet (75 mg) by mouth once daily as needed (take one tab at night).       No current facility-administered medications on file prior to visit.        Review of Systems   A comprehensive 10+ review of systems was negative except for: see hpi          Physical Exam                                                                                                                      General: Well developed, well nourished, alert and cooperative, appears in no acute distress  Head: Normocephalic, atraumatic  Neck: supple, trachea midline  Eyes: Non-injected conjunctiva, sclera clear, no proptosis  Cardiac: Extremities are warm and well perfused. No edema, cyanosis or pallor.   Lungs: Breathing is easy, non-labored. Speaking in clear and complete sentences. Normal diaphragmatic movement.  Abdomen: soft, non-distended, non-tender, no rebound or guarding, no hernia and no CVA tenderness   MSK: Ambulatory assisted with a cane  Neuro: alert and oriented to person, place and time  Psych: Demonstrates good judgement and reason, without hallucinations, abnormal affect or abnormal behaviors.  Skin: no obvious lesions, no rashes      Labs  N/A    Imaging  N/A    IMPRESSION AND PLAN:  This is a 89 y.o.  female,  with possible Tarah, OAB symptoms.       Tarah:   -Sent urine off for culture today will call patient with results   -Standing culture in the system     OAB:  -Great response with Gemtesa    Follow up in 3 months       All questions and concerns were answered and addressed.  The patient expressed understanding and agrees with the plan.     Reviewed and approved by HUGH BANEGAS on 3/5/24 at 2:00 PM.

## 2024-03-05 ENCOUNTER — OFFICE VISIT (OUTPATIENT)
Dept: UROLOGY | Facility: CLINIC | Age: 89
End: 2024-03-05
Payer: MEDICARE

## 2024-03-05 VITALS — SYSTOLIC BLOOD PRESSURE: 155 MMHG | DIASTOLIC BLOOD PRESSURE: 82 MMHG | HEART RATE: 102 BPM

## 2024-03-05 DIAGNOSIS — R30.0 DYSURIA: ICD-10-CM

## 2024-03-05 DIAGNOSIS — N32.81 OAB (OVERACTIVE BLADDER): Primary | ICD-10-CM

## 2024-03-05 LAB
POC APPEARANCE, URINE: ABNORMAL
POC BILIRUBIN, URINE: NEGATIVE
POC BLOOD, URINE: ABNORMAL
POC COLOR, URINE: YELLOW
POC GLUCOSE, URINE: NEGATIVE MG/DL
POC KETONES, URINE: NEGATIVE MG/DL
POC LEUKOCYTES, URINE: ABNORMAL
POC NITRITE,URINE: POSITIVE
POC PH, URINE: 5.5 PH
POC PROTEIN, URINE: NEGATIVE MG/DL
POC SPECIFIC GRAVITY, URINE: 1.01
POC UROBILINOGEN, URINE: 0.2 EU/DL

## 2024-03-05 PROCEDURE — 1126F AMNT PAIN NOTED NONE PRSNT: CPT

## 2024-03-05 PROCEDURE — 1036F TOBACCO NON-USER: CPT

## 2024-03-05 PROCEDURE — 87086 URINE CULTURE/COLONY COUNT: CPT

## 2024-03-05 PROCEDURE — 1159F MED LIST DOCD IN RCRD: CPT

## 2024-03-05 PROCEDURE — 81002 URINALYSIS NONAUTO W/O SCOPE: CPT

## 2024-03-05 PROCEDURE — 3079F DIAST BP 80-89 MM HG: CPT

## 2024-03-05 PROCEDURE — 99204 OFFICE O/P NEW MOD 45 MIN: CPT

## 2024-03-05 PROCEDURE — 1160F RVW MEDS BY RX/DR IN RCRD: CPT

## 2024-03-05 PROCEDURE — 1157F ADVNC CARE PLAN IN RCRD: CPT

## 2024-03-05 PROCEDURE — 3077F SYST BP >= 140 MM HG: CPT

## 2024-03-05 PROCEDURE — 87186 SC STD MICRODIL/AGAR DIL: CPT

## 2024-03-05 RX ORDER — VIBEGRON 75 MG/1
75 TABLET, FILM COATED ORAL DAILY
Qty: 84 TABLET | Refills: 0 | COMMUNITY
Start: 2024-03-05 | End: 2024-05-28

## 2024-03-08 DIAGNOSIS — N39.0 RECURRENT UTI: Primary | ICD-10-CM

## 2024-03-08 LAB — BACTERIA UR CULT: ABNORMAL

## 2024-03-08 RX ORDER — NITROFURANTOIN 25; 75 MG/1; MG/1
100 CAPSULE ORAL 2 TIMES DAILY
Qty: 10 CAPSULE | Refills: 0 | Status: SHIPPED | OUTPATIENT
Start: 2024-03-08 | End: 2024-03-13

## 2024-03-08 RX ORDER — NITROFURANTOIN 25; 75 MG/1; MG/1
CAPSULE ORAL
Qty: 90 CAPSULE | Refills: 0 | Status: SHIPPED | OUTPATIENT
Start: 2024-03-08

## 2024-03-28 ENCOUNTER — TELEPHONE (OUTPATIENT)
Dept: PRIMARY CARE | Facility: CLINIC | Age: 89
End: 2024-03-28
Payer: MEDICARE

## 2024-03-28 DIAGNOSIS — R23.8 VESICULAR RASH: Primary | ICD-10-CM

## 2024-03-28 RX ORDER — VALACYCLOVIR HYDROCHLORIDE 1 G/1
1000 TABLET, FILM COATED ORAL 3 TIMES DAILY
Qty: 21 TABLET | Refills: 0 | Status: SHIPPED | OUTPATIENT
Start: 2024-03-28 | End: 2024-04-04

## 2024-03-28 NOTE — TELEPHONE ENCOUNTER
Pt called in with a rash on the middle front of her stomach and around her back on her right shoulder. Pt states the rash is red with no bumps and is very itchy. Pt is concerned this is shingles. There aren't any sick visits right now. Pt denies any other sx and was unable to give a clearer description of the rash. Pt would like you to please advise. [facility] in [place].

## 2024-05-23 ENCOUNTER — TELEPHONE (OUTPATIENT)
Dept: PRIMARY CARE | Facility: CLINIC | Age: 89
End: 2024-05-23
Payer: MEDICARE

## 2024-05-23 DIAGNOSIS — J06.9 URI WITH COUGH AND CONGESTION: Primary | ICD-10-CM

## 2024-05-23 RX ORDER — BENZONATATE 200 MG/1
200 CAPSULE ORAL 3 TIMES DAILY PRN
Qty: 42 CAPSULE | Refills: 0 | Status: SHIPPED | OUTPATIENT
Start: 2024-05-23 | End: 2024-06-22

## 2024-05-23 NOTE — TELEPHONE ENCOUNTER
Patient called she has an URI she has tried otc  chloracedin but would something for a cough called in if possible . She uses [facility]

## 2024-06-05 ENCOUNTER — OFFICE VISIT (OUTPATIENT)
Dept: UROLOGY | Facility: CLINIC | Age: 89
End: 2024-06-05
Payer: MEDICARE

## 2024-06-05 VITALS — HEART RATE: 76 BPM | SYSTOLIC BLOOD PRESSURE: 135 MMHG | DIASTOLIC BLOOD PRESSURE: 94 MMHG

## 2024-06-05 DIAGNOSIS — N32.81 OAB (OVERACTIVE BLADDER): Primary | ICD-10-CM

## 2024-06-05 LAB
POC BILIRUBIN, URINE: NEGATIVE
POC BLOOD, URINE: ABNORMAL
POC GLUCOSE, URINE: NEGATIVE MG/DL
POC KETONES, URINE: NEGATIVE MG/DL
POC LEUKOCYTES, URINE: NEGATIVE
POC NITRITE,URINE: NEGATIVE
POC PH, URINE: 5 PH
POC PROTEIN, URINE: NEGATIVE MG/DL
POC SPECIFIC GRAVITY, URINE: 1.01
POC UROBILINOGEN, URINE: 0.2 EU/DL

## 2024-06-05 PROCEDURE — 81003 URINALYSIS AUTO W/O SCOPE: CPT

## 2024-06-05 PROCEDURE — 1159F MED LIST DOCD IN RCRD: CPT

## 2024-06-05 PROCEDURE — 99214 OFFICE O/P EST MOD 30 MIN: CPT

## 2024-06-05 PROCEDURE — 51798 US URINE CAPACITY MEASURE: CPT

## 2024-06-05 PROCEDURE — 1036F TOBACCO NON-USER: CPT

## 2024-06-05 PROCEDURE — 1160F RVW MEDS BY RX/DR IN RCRD: CPT

## 2024-06-05 PROCEDURE — 1157F ADVNC CARE PLAN IN RCRD: CPT

## 2024-06-05 PROCEDURE — 3080F DIAST BP >= 90 MM HG: CPT

## 2024-06-05 PROCEDURE — 3075F SYST BP GE 130 - 139MM HG: CPT

## 2024-06-05 RX ORDER — VIBEGRON 75 MG/1
75 TABLET, FILM COATED ORAL NIGHTLY
Qty: 84 TABLET | Refills: 0 | COMMUNITY
Start: 2024-06-05 | End: 2024-08-28

## 2024-06-05 NOTE — PROGRESS NOTES
[facility] [place]  Outpatient Clinic Note    Patient: Danika Demarco  Age/Sex: 89 y.o., female  MRN: [id number]    Chief Complaint:  3 month medication follow up          History of Present Illness  This is a 89 y.o. female, who presents for follow-up for 3 month medication follow up. She is currently taking Gemtesa she stated that this has significantly improved her OAB symptoms, stating she is 100% better.  She continues to sleep through the night, her urgency and frequency has decreased and she is not leaking.  She is very satisfied with this medication and would like to continue on this treatment plan.  The patient stated that the cost of the medication is very expensive and she cannot afford it through the pharmacy.  She denies gross hematuria, back pain, flank pain, pelvic pressure, fever or chills.            Past Medical & Surgical History  Past Medical History:   Diagnosis Date    Abnormal levels of other serum enzymes     Elevated liver enzymes    Personal history of other (healed) physical injury and trauma     History of traumatic subdural hematoma    Personal history of other diseases of urinary system     History of hematuria    Personal history of urinary (tract) infections     History of urinary tract infection    Urinary bladder incontinence 09/25/2023     Past Surgical History:   Procedure Laterality Date    OTHER SURGICAL HISTORY  01/21/2020    Bladder surgery    OTHER SURGICAL HISTORY  01/21/2020    Hysterectomy    OTHER SURGICAL HISTORY  01/21/2020    Craniotomy    OTHER SURGICAL HISTORY  01/21/2020    Varicose vein ligation    OTHER SURGICAL HISTORY  01/21/2020    Cholecystectomy    OTHER SURGICAL HISTORY  01/21/2020    Hernia repair    OTHER SURGICAL HISTORY  01/21/2020    Oophorectomy bilateral       Family History  Family History   Problem Relation Name Age of Onset    Breast cancer Sister      Diabetes Brother      Prostate cancer Brother         Social History  She reports that she has never  smoked. She has never been exposed to tobacco smoke. She has never used smokeless tobacco. She reports current alcohol use. She reports that she does not use drugs.    Allergies  Patient has no known allergies.    Medications:  Current Outpatient Medications on File Prior to Visit   Medication Sig Dispense Refill    aspirin 325 mg tablet Take 1 tablet (325 mg) by mouth once daily.      benzonatate (Tessalon) 200 mg capsule Take 1 capsule (200 mg) by mouth 3 times a day as needed for cough. Do not crush or chew. 42 capsule 0    lisinopril 40 mg tablet Take 1 tablet (40 mg) by mouth once daily.      meclizine (Antivert) 25 mg tablet Take by mouth.      metoprolol succinate XL (Toprol-XL) 25 mg 24 hr tablet Take 1 tablet (25 mg) by mouth once daily. With 50 mg tablet once daily for total of 75 mg daily 90 tablet 3    metoprolol succinate XL (Toprol-XL) 50 mg 24 hr tablet Take 1 tablet (50 mg) by mouth once daily. With 25 mg tablet once daily for total of 75 mg daily 90 tablet 3    nitrofurantoin, macrocrystal-monohydrate, (Macrobid) 100 mg capsule Take 1 pill daily for 3 months 90 capsule 0    vibegron 75 mg tablet Take 1 tablet (75 mg) by mouth once daily as needed (take one tab at night).       No current facility-administered medications on file prior to visit.        Review of Systems   A comprehensive 10+ review of systems was negative except for: see hpi          Physical Exam                                                                                                                      General: Well developed, well nourished, alert and cooperative, appears in no acute distress  Head: Normocephalic, atraumatic  Neck: supple, trachea midline  Eyes: Non-injected conjunctiva, sclera clear, no proptosis  Cardiac: Extremities are warm and well perfused. No edema, cyanosis or pallor.   Lungs: Breathing is easy, non-labored. Speaking in clear and complete sentences. Normal diaphragmatic movement.  Abdomen: soft,  non-distended, non-tender, no rebound or guarding, no hernia and no CVA tenderness   MSK: Ambulatory with steady gait, unassisted  Neuro: alert and oriented to person, place and time  Psych: Demonstrates good judgement and reason, without hallucinations, abnormal affect or abnormal behaviors.  Skin: no obvious lesions, no rashes      Labs  N/A    Imaging  N/A    IMPRESSION AND PLAN:  This is a 89 y.o. female, with possible Tarah, OAB symptoms.       Tarah:   -Standing culture in the  system     OAB:  -Great response with Gemtesa  -Will put in a clinical pharmacy referral for cost assistance     Follow up in 3 months     All questions and concerns were answered and addressed.  The patient expressed understanding and agrees with the plan.     Reviewed and approved by HUGH BANEGAS on 6/5/24 at 7:32 AM.

## 2024-06-18 ENCOUNTER — LAB (OUTPATIENT)
Dept: LAB | Facility: LAB | Age: 89
End: 2024-06-18
Payer: MEDICARE

## 2024-06-18 DIAGNOSIS — Z00.00 ROUTINE GENERAL MEDICAL EXAMINATION AT HEALTH CARE FACILITY: ICD-10-CM

## 2024-06-18 DIAGNOSIS — I48.0 AF (PAROXYSMAL ATRIAL FIBRILLATION) (MULTI): ICD-10-CM

## 2024-06-18 DIAGNOSIS — M81.0 POSTMENOPAUSAL BONE LOSS: ICD-10-CM

## 2024-06-18 DIAGNOSIS — E78.2 MIXED HYPERLIPIDEMIA: ICD-10-CM

## 2024-06-18 DIAGNOSIS — I10 ESSENTIAL HYPERTENSION: ICD-10-CM

## 2024-06-18 DIAGNOSIS — E11.9 CONTROLLED TYPE 2 DIABETES MELLITUS WITHOUT COMPLICATION, WITHOUT LONG-TERM CURRENT USE OF INSULIN (MULTI): ICD-10-CM

## 2024-06-18 LAB
25(OH)D3 SERPL-MCNC: 34 NG/ML (ref 30–100)
ALBUMIN SERPL BCP-MCNC: 3.7 G/DL (ref 3.4–5)
ALP SERPL-CCNC: 69 U/L (ref 33–136)
ALT SERPL W P-5'-P-CCNC: 16 U/L (ref 7–45)
ANION GAP SERPL CALC-SCNC: 13 MMOL/L (ref 10–20)
AST SERPL W P-5'-P-CCNC: 26 U/L (ref 9–39)
BASOPHILS # BLD AUTO: 0.05 X10*3/UL (ref 0–0.1)
BASOPHILS NFR BLD AUTO: 0.7 %
BILIRUB SERPL-MCNC: 1 MG/DL (ref 0–1.2)
BUN SERPL-MCNC: 20 MG/DL (ref 6–23)
CALCIUM SERPL-MCNC: 9.4 MG/DL (ref 8.6–10.3)
CHLORIDE SERPL-SCNC: 104 MMOL/L (ref 98–107)
CHOLEST SERPL-MCNC: 106 MG/DL (ref 0–199)
CHOLESTEROL/HDL RATIO: 2.8
CO2 SERPL-SCNC: 26 MMOL/L (ref 21–32)
CREAT SERPL-MCNC: 0.66 MG/DL (ref 0.5–1.05)
CREAT UR-MCNC: 51.8 MG/DL (ref 20–320)
EGFRCR SERPLBLD CKD-EPI 2021: 82 ML/MIN/1.73M*2
EOSINOPHIL # BLD AUTO: 0.37 X10*3/UL (ref 0–0.4)
EOSINOPHIL NFR BLD AUTO: 5 %
ERYTHROCYTE [DISTWIDTH] IN BLOOD BY AUTOMATED COUNT: 14.2 % (ref 11.5–14.5)
EST. AVERAGE GLUCOSE BLD GHB EST-MCNC: 128 MG/DL
GLUCOSE SERPL-MCNC: 106 MG/DL (ref 74–99)
HBA1C MFR BLD: 6.1 %
HCT VFR BLD AUTO: 45.7 % (ref 36–46)
HDLC SERPL-MCNC: 38.1 MG/DL
HGB BLD-MCNC: 15 G/DL (ref 12–16)
IMM GRANULOCYTES # BLD AUTO: 0.03 X10*3/UL (ref 0–0.5)
IMM GRANULOCYTES NFR BLD AUTO: 0.4 % (ref 0–0.9)
LDLC SERPL CALC-MCNC: 39 MG/DL
LYMPHOCYTES # BLD AUTO: 2.21 X10*3/UL (ref 0.8–3)
LYMPHOCYTES NFR BLD AUTO: 29.6 %
MCH RBC QN AUTO: 31.4 PG (ref 26–34)
MCHC RBC AUTO-ENTMCNC: 32.8 G/DL (ref 32–36)
MCV RBC AUTO: 96 FL (ref 80–100)
MICROALBUMIN UR-MCNC: 11.2 MG/L
MICROALBUMIN/CREAT UR: 21.6 UG/MG CREAT
MONOCYTES # BLD AUTO: 0.67 X10*3/UL (ref 0.05–0.8)
MONOCYTES NFR BLD AUTO: 9 %
NEUTROPHILS # BLD AUTO: 4.13 X10*3/UL (ref 1.6–5.5)
NEUTROPHILS NFR BLD AUTO: 55.3 %
NON HDL CHOLESTEROL: 68 MG/DL (ref 0–149)
NRBC BLD-RTO: 0 /100 WBCS (ref 0–0)
PLATELET # BLD AUTO: 247 X10*3/UL (ref 150–450)
POTASSIUM SERPL-SCNC: 4 MMOL/L (ref 3.5–5.3)
PROT SERPL-MCNC: 7.4 G/DL (ref 6.4–8.2)
RBC # BLD AUTO: 4.78 X10*6/UL (ref 4–5.2)
SODIUM SERPL-SCNC: 139 MMOL/L (ref 136–145)
TRIGL SERPL-MCNC: 146 MG/DL (ref 0–149)
VLDL: 29 MG/DL (ref 0–40)
WBC # BLD AUTO: 7.5 X10*3/UL (ref 4.4–11.3)

## 2024-06-18 PROCEDURE — 80061 LIPID PANEL: CPT

## 2024-06-18 PROCEDURE — 82570 ASSAY OF URINE CREATININE: CPT

## 2024-06-18 PROCEDURE — 83036 HEMOGLOBIN GLYCOSYLATED A1C: CPT

## 2024-06-18 PROCEDURE — 82043 UR ALBUMIN QUANTITATIVE: CPT

## 2024-06-18 PROCEDURE — 85025 COMPLETE CBC W/AUTO DIFF WBC: CPT

## 2024-06-18 PROCEDURE — 82306 VITAMIN D 25 HYDROXY: CPT

## 2024-06-18 PROCEDURE — 80053 COMPREHEN METABOLIC PANEL: CPT

## 2024-06-18 PROCEDURE — 36415 COLL VENOUS BLD VENIPUNCTURE: CPT

## 2024-07-12 ENCOUNTER — APPOINTMENT (OUTPATIENT)
Dept: PHARMACY | Facility: HOSPITAL | Age: 89
End: 2024-07-12
Payer: MEDICARE

## 2024-07-12 DIAGNOSIS — N32.81 OAB (OVERACTIVE BLADDER): ICD-10-CM

## 2024-07-12 RX ORDER — VIBEGRON 75 MG/1
75 TABLET, FILM COATED ORAL DAILY
Qty: 90 TABLET | Refills: 3 | Status: SHIPPED | OUTPATIENT
Start: 2024-07-12

## 2024-07-12 NOTE — PROGRESS NOTES
Patient ID: Danika Demarco is a 89 y.o. female who presents for No chief complaint on file..    Referring Provider:   Pt was referred for    Preferred Pharmacy:    [facility]  [address] [address]  Phone: [phone] Fax: [phone]    [facility] [place], [place] - [address]  [address] [address]  Phone: [phone] Fax: [phone]      Copay assistance:   Do you have copays on your medications? Yes  Do you have trouble affording your current medications? Yes     Patient Assistance Screening (VAF)    Patient verbally reports monthly or yearly income which is less than 400% federal poverty level   Application for program has been submitted for the following medications:   Gemtesa   Patient has been informed that program team will be reaching out to them to discuss necessary documentation, instructed to answer phone/return voicemail.   Patient aware this process may take up to 6 weeks.   If approved medication must be filled through [facility] pharmacy and may be picked up or mailed to patient.       Subjective      Urinary Symptoms    Any history of:   Narrow-angle glaucoma? No   Impaired gastric emptying? No   Urinary retention? No     Bladder irritants (ex. caffeine, alcohol)? 1 cup of coffee a day   Last fluids before bedtime? 9 pm (bedtime 10 pm)  Frequently of bowel movement? Daily   Tobacco use? No   How many protective undergarments are you utilizing daily?   Using one small pad daily, was using largest pad before starting Gemtesa   What medications have been tried/stopped?   N/A  Current medication? Gemtesa   When started? December 2023  Side effects? Does mention dry mouth first thing in the morning   Improvement in symptoms? Yes, she has eliminated night-time wakening. It has also helped with symptoms during the day      Cardiovascular Health  The ASCVD Risk score (Luda PILLAI, et al., 2019) failed to calculate  "for the following reasons:    The 2019 ASCVD risk score is only valid for ages 40 to 79    Lab Results   Component Value Date    CHOL 106 06/18/2024     Lab Results   Component Value Date    HDL 38.1 06/18/2024     Lab Results   Component Value Date    LDLCALC 39 06/18/2024     Lab Results   Component Value Date    TRIG 146 06/18/2024     No components found for: \"CHOLHDL\"        Blood Sugar Balance  Lab Results   Component Value Date    GLUCOSE 106 (H) 06/18/2024    HGBA1C 6.1 (H) 06/18/2024    HGBA1C 6.3 (A) 08/12/2022    HGBA1C 6.1 08/05/2021     No results found for: \"LEPTIN\", \"INSULFAST\", \"GLUF\"      Thyroid  Lab Results   Component Value Date    TSH 3.93 10/18/2023       Iron Status  No results found for: \"IRON\", \"TIBC\", \"FERRITIN\"     Kidney Function  Lab Results   Component Value Date    GFRF 78 04/19/2023    CREATININE 0.66 06/18/2024       Potassium  Lab Results   Component Value Date    K 4.0 06/18/2024        Vitamin D3  Lab Results   Component Value Date    VITD25 34 06/18/2024       Current Outpatient Medications on File Prior to Visit   Medication Sig Dispense Refill    aspirin 325 mg tablet Take 1 tablet (325 mg) by mouth once daily.      lisinopril 40 mg tablet Take 1 tablet (40 mg) by mouth once daily.      meclizine (Antivert) 25 mg tablet Take by mouth.      metoprolol succinate XL (Toprol-XL) 25 mg 24 hr tablet Take 1 tablet (25 mg) by mouth once daily. With 50 mg tablet once daily for total of 75 mg daily 90 tablet 3    metoprolol succinate XL (Toprol-XL) 50 mg 24 hr tablet Take 1 tablet (50 mg) by mouth once daily. With 25 mg tablet once daily for total of 75 mg daily 90 tablet 3    nitrofurantoin, macrocrystal-monohydrate, (Macrobid) 100 mg capsule Take 1 pill daily for 3 months 90 capsule 0    vibegron (Gemtesa) 75 mg tablet Take 1 tablet (75 mg) by mouth once daily at bedtime. LOT [id number]  EXP Dec 2027 84 tablet 0    vibegron 75 mg tablet Take 1 tablet (75 mg) by mouth once daily as needed " (take one tab at night).       No current facility-administered medications on file prior to visit.        Medication and allergy reconciliation completed     Drug Interactions   No significant drug interactions identified    Assessment/Plan     Patient is experiencing urinary frequency, urgency, and incontinence.   Has tried before N/A  Patient plans to apply for  VAF, pending 1099 via email.     Gemtesa   Discussed MOA: works by activating beta-3 adrenergic receptors in the bladder resulting in relaxation of the detrusor smooth muscle during the urine storage phase, thus increasing bladder capacity.  Provided education on administration and potential side effects including but not limited to hot flashes <2%, constipation/diarrhea <2%, dry mouth <2%, and headache <4%.   Gemtesa (vibegron) starts working almost immediately - within a few days of first taking it, with noticeable improvements in urinary urgency, frequency, and incontinence noted in clinical trials at 2 weeks which were reported as significant by 12 weeks.      LABS  Lab Results   Component Value Date    GFRF 78 04/19/2023     Make sure GFR >15 ml/min or dose adjust      CONTINUE  Gemtessa 75 mg once daily    Follow-up: 8/9/24 @ 10 am      Time spent with pt: Total length of time 30 (minutes) of the encounter and more than 50% was spent counseling the patient.     Ying Good, PharmD     Patient Assistance Program Approval:     We are pleased to inform you that your application for assistance has been approved.     This approval is valid through  7/23/2025  as long as the following criteria continue to be satisfied:     Your medication (Gemtesa) remains covered under your current insurance plan.   Your prescriber does not discontinue therapy.   You do not seek reimbursement from any other private or government-funded programs for the  medication.    Under this program, the pharmacy will first bill your insurance plan for your indemnified  specified medication. The [employer] Assistance Fund will then offset your copay balance, so that your out-of pocket expense for your specialty medication will be $0.00.    Ying Good PharmD     Continue all meds under the continuation of care with the referring provider and clinical pharmacy team.    Verbal consent to manage patient's drug therapy was obtained from the patient and/or an individual authorized to act on behalf of a patient. They were informed they may decline to participate or withdraw from participation in pharmacy services at any time.

## 2024-07-23 PROCEDURE — RXMED WILLOW AMBULATORY MEDICATION CHARGE

## 2024-07-25 ENCOUNTER — PHARMACY VISIT (OUTPATIENT)
Dept: PHARMACY | Facility: CLINIC | Age: 89
End: 2024-07-25
Payer: COMMERCIAL

## 2024-07-25 PROBLEM — Z86.79 HISTORY OF SUBDURAL HEMATOMA: Status: ACTIVE | Noted: 2024-07-25

## 2024-07-25 PROBLEM — M81.0 POST-MENOPAUSAL OSTEOPOROSIS: Status: ACTIVE | Noted: 2024-07-25

## 2024-07-25 PROBLEM — N39.0 URINARY TRACT INFECTION: Status: ACTIVE | Noted: 2024-07-25

## 2024-07-25 PROBLEM — R74.8 ELEVATED LIVER ENZYMES: Status: ACTIVE | Noted: 2024-07-25

## 2024-07-30 ENCOUNTER — APPOINTMENT (OUTPATIENT)
Dept: PRIMARY CARE | Facility: CLINIC | Age: 89
End: 2024-07-30
Payer: MEDICARE

## 2024-08-06 ENCOUNTER — APPOINTMENT (OUTPATIENT)
Dept: PRIMARY CARE | Facility: CLINIC | Age: 89
End: 2024-08-06
Payer: MEDICARE

## 2024-08-06 VITALS
OXYGEN SATURATION: 98 % | SYSTOLIC BLOOD PRESSURE: 160 MMHG | HEART RATE: 106 BPM | TEMPERATURE: 97.7 F | WEIGHT: 139 LBS | HEIGHT: 61 IN | DIASTOLIC BLOOD PRESSURE: 85 MMHG | BODY MASS INDEX: 26.24 KG/M2 | RESPIRATION RATE: 16 BRPM

## 2024-08-06 DIAGNOSIS — I48.0 AF (PAROXYSMAL ATRIAL FIBRILLATION) (MULTI): ICD-10-CM

## 2024-08-06 DIAGNOSIS — E11.9 CONTROLLED TYPE 2 DIABETES MELLITUS WITHOUT COMPLICATION, WITHOUT LONG-TERM CURRENT USE OF INSULIN (MULTI): ICD-10-CM

## 2024-08-06 DIAGNOSIS — I10 ESSENTIAL HYPERTENSION: Primary | ICD-10-CM

## 2024-08-06 DIAGNOSIS — E78.2 MIXED HYPERLIPIDEMIA: ICD-10-CM

## 2024-08-06 DIAGNOSIS — R53.83 TIREDNESS: ICD-10-CM

## 2024-08-06 DIAGNOSIS — M81.0 POSTMENOPAUSAL BONE LOSS: ICD-10-CM

## 2024-08-06 DIAGNOSIS — N32.81 OAB (OVERACTIVE BLADDER): ICD-10-CM

## 2024-08-06 DIAGNOSIS — R25.2 LEG CRAMPS: ICD-10-CM

## 2024-08-06 PROCEDURE — 3079F DIAST BP 80-89 MM HG: CPT | Performed by: INTERNAL MEDICINE

## 2024-08-06 PROCEDURE — 3077F SYST BP >= 140 MM HG: CPT | Performed by: INTERNAL MEDICINE

## 2024-08-06 PROCEDURE — 1159F MED LIST DOCD IN RCRD: CPT | Performed by: INTERNAL MEDICINE

## 2024-08-06 PROCEDURE — 99214 OFFICE O/P EST MOD 30 MIN: CPT | Performed by: INTERNAL MEDICINE

## 2024-08-06 PROCEDURE — 1036F TOBACCO NON-USER: CPT | Performed by: INTERNAL MEDICINE

## 2024-08-06 PROCEDURE — 1157F ADVNC CARE PLAN IN RCRD: CPT | Performed by: INTERNAL MEDICINE

## 2024-08-06 SDOH — ECONOMIC STABILITY: FOOD INSECURITY: WITHIN THE PAST 12 MONTHS, YOU WORRIED THAT YOUR FOOD WOULD RUN OUT BEFORE YOU GOT MONEY TO BUY MORE.: NEVER TRUE

## 2024-08-06 SDOH — ECONOMIC STABILITY: FOOD INSECURITY: WITHIN THE PAST 12 MONTHS, THE FOOD YOU BOUGHT JUST DIDN'T LAST AND YOU DIDN'T HAVE MONEY TO GET MORE.: NEVER TRUE

## 2024-08-06 ASSESSMENT — LIFESTYLE VARIABLES
SKIP TO QUESTIONS 9-10: 1
AUDIT-C TOTAL SCORE: 1
HOW MANY STANDARD DRINKS CONTAINING ALCOHOL DO YOU HAVE ON A TYPICAL DAY: 1 OR 2
HOW OFTEN DO YOU HAVE A DRINK CONTAINING ALCOHOL: MONTHLY OR LESS
HOW OFTEN DO YOU HAVE SIX OR MORE DRINKS ON ONE OCCASION: NEVER

## 2024-08-06 ASSESSMENT — PATIENT HEALTH QUESTIONNAIRE - PHQ9
1. LITTLE INTEREST OR PLEASURE IN DOING THINGS: NOT AT ALL
SUM OF ALL RESPONSES TO PHQ9 QUESTIONS 1 & 2: 0
2. FEELING DOWN, DEPRESSED OR HOPELESS: NOT AT ALL

## 2024-08-06 NOTE — ASSESSMENT & PLAN NOTE
Patient takes ASA only, no anticoagulation therapy, she follows up with cardiology, history of subdural hematoma

## 2024-08-06 NOTE — ASSESSMENT & PLAN NOTE
Systolic HTN noted today, patient thinks that BP is elevated when in office. She will follow up with cardiology also

## 2024-08-06 NOTE — PROGRESS NOTES
"Chief Complaint/HPI:  Follow up    DM type 2: patient takes no med therapy, checks glucoses once daily, glucoses are generally well controlled     HTN:  Takes lisinopril 40 mg daily and metoprolol 75 mg daily.  Patient used to have symptoms of vertigo, she has not had any symptoms . Patient thinks that BP is elevated \"because I'm here\"     Atrial fibrillation: Takes metoprolol 75 mg. Last saw Dr. Ashby in October 2022, is due to follow up with him in October. She did have an episode of atrial fibrillation back in April which prompted an emergency room visit but no hospital admission. She takes a full dose ASA. She has a history of intracerebral bleed requiring drainage. She does not take other anticoagulant therapies     Hyperlipidemia: Stable, eats a lot of vegetables and salads.      Overactive bladder: patient now takes Gemtesa daily, patient sees Dr García, the med is very effective. Patient is very pleased with current treatment, she has been getting sample meds from Dr García, she is now getting meds through  Pharmacy.     Depression, mild: she takes no med therapy now    ROS otherwise negative aside from what was mentioned above in HPI.      Patient Active Problem List   Diagnosis    Allergic rhinitis    Anxiety    Aortic stenosis, mild    AF (paroxysmal atrial fibrillation) (Multi)    Cataract    Colitis, ulcerative (Multi)    Controlled type 2 diabetes mellitus (Multi)    Dizziness    Dysrhythmia    Essential hypertension    Hyperlipidemia    Hypertension, accelerated    Mild depression    Mitral valve regurgitation    OAB (overactive bladder)    Psoriasis    Hemangioma of skin and subcutaneous tissue    Nummular dermatitis    Other seborrheic keratosis    Rosacea, unspecified    Routine general medical examination at health care facility    Elevated liver enzymes    History of subdural hematoma    Post-menopausal osteoporosis    Urinary tract infection         Past Medical History:   Diagnosis Date    " Abnormal levels of other serum enzymes     Elevated liver enzymes    Personal history of other (healed) physical injury and trauma     History of traumatic subdural hematoma    Personal history of other diseases of urinary system     History of hematuria    Personal history of urinary (tract) infections     History of urinary tract infection    Urinary bladder incontinence 09/25/2023     Past Surgical History:   Procedure Laterality Date    OTHER SURGICAL HISTORY  01/21/2020    Bladder surgery    OTHER SURGICAL HISTORY  01/21/2020    Hysterectomy    OTHER SURGICAL HISTORY  01/21/2020    Craniotomy    OTHER SURGICAL HISTORY  01/21/2020    Varicose vein ligation    OTHER SURGICAL HISTORY  01/21/2020    Cholecystectomy    OTHER SURGICAL HISTORY  01/21/2020    Hernia repair    OTHER SURGICAL HISTORY  01/21/2020    Oophorectomy bilateral     Social History     Social History Narrative    Not on file         ALLERGIES  Patient has no known allergies.      MEDICATIONS  Current Outpatient Medications on File Prior to Visit   Medication Sig Dispense Refill    aspirin 325 mg tablet Take 1 tablet (325 mg) by mouth once daily.      lisinopril 40 mg tablet Take 1 tablet (40 mg) by mouth once daily.      meclizine (Antivert) 25 mg tablet Take by mouth.      metoprolol succinate XL (Toprol-XL) 25 mg 24 hr tablet Take 1 tablet (25 mg) by mouth once daily. With 50 mg tablet once daily for total of 75 mg daily 90 tablet 3    metoprolol succinate XL (Toprol-XL) 50 mg 24 hr tablet Take 1 tablet (50 mg) by mouth once daily. With 25 mg tablet once daily for total of 75 mg daily 90 tablet 3    nitrofurantoin, macrocrystal-monohydrate, (Macrobid) 100 mg capsule Take 1 pill daily for 3 months 90 capsule 0    vibegron (Gemtesa) 75 mg tablet Take 1 tablet (75 mg) by mouth once daily. 90 tablet 3     No current facility-administered medications on file prior to visit.         PHYSICAL EXAM  /85 (BP Location: Left arm, Patient Position:  "Sitting, BP Cuff Size: Adult)   Pulse 106   Temp 36.5 °C (97.7 °F)   Resp 16   Ht 1.549 m (5' 1\")   Wt 63 kg (139 lb)   SpO2 98%   BMI 26.26 kg/m²   Body mass index is 26.26 kg/m².  Constitutional: Well developed, awake/alert/oriented x3, no distress, alert and cooperative , accompanied by   HEENT: auricles intact, no thyroid masses, no neck masses  Respiratory/Thorax: Patent airways, CTAB, normal breath sounds with good chest expansion, thorax symmetric   Cardiovascular: irregularly, irregular rate and rhythm, rate is controlled, a 2/6 systolic murmur is noted over the LUSB, radiating to the carotids, this is not new  Musculoskeletal: ROM intact, no joint swelling, normal strength   Extremities: normal extremities, no cyanosis, trace pitting of the left ankle only, a few varicosities are noted, no contusions or wounds, no clubbing   Psychological: Appropriate mood and behavior        ASSESSMENT/PLAN  Problem List Items Addressed This Visit       AF (paroxysmal atrial fibrillation) (Multi)    Relevant Orders    TSH with reflex to Free T4 if abnormal    Controlled type 2 diabetes mellitus (Multi)    Current Assessment & Plan     HgbA1C is stable, continue to monitor         Relevant Orders    Hemoglobin A1C    Comprehensive Metabolic Panel    CBC and Auto Differential    Albumin-Creatinine Ratio, Urine Random    Essential hypertension - Primary    Current Assessment & Plan     Systolic HTN noted today, patient thinks that BP is elevated when in office. She will follow up with cardiology also         Relevant Orders    Comprehensive Metabolic Panel    CBC and Auto Differential    Hyperlipidemia    Relevant Orders    Comprehensive Metabolic Panel    CBC and Auto Differential    Lipid Panel    OAB (overactive bladder)    Current Assessment & Plan     Well controlled with Gemtesa, sees urogynecology, gets meds through  Pharmacy          Relevant Orders    Comprehensive Metabolic Panel    CBC and Auto " Differential     Other Visit Diagnoses       Postmenopausal bone loss        Relevant Orders    Vitamin D 25-Hydroxy,Total (for eval of Vitamin D levels)          No med changes, recheck labs in 6 months, patient is stable, follow up after testing.     David Mcdonald MD

## 2024-08-09 ENCOUNTER — APPOINTMENT (OUTPATIENT)
Dept: PHARMACY | Facility: HOSPITAL | Age: 89
End: 2024-08-09
Payer: MEDICARE

## 2024-08-09 DIAGNOSIS — N32.81 OAB (OVERACTIVE BLADDER): Primary | ICD-10-CM

## 2024-08-09 NOTE — PROGRESS NOTES
Patient ID: Danika Demarco is a 89 y.o. female who presents for No chief complaint on file..    Referring Provider: Kathleen Baez PA-C  Pt was referred for cost assistance for Gemtesa     Preferred Pharmacy:    [id number] - [place], [place] - [address]  [address] [facility] [address]  Phone: [phone] Fax: [phone]    [facility] [place], [place] - [address]  [address] [facility] [address]  Phone: [phone] Fax: [phone]    [facility]  [address], [address]  [facility] [address]  Phone: [phone] Fax: [phone]      Copay assistance:   Do you have copays on your medications? Yes  Do you have trouble affording your current medications? Yes     Patient Assistance Screening (VAF)    Patient verbally reports monthly or yearly income which is less than 400% federal poverty level   Application for program has been submitted for the following medications:   Gemtesa   Patient has been informed that program team will be reaching out to them to discuss necessary documentation, instructed to answer phone/return voicemail.   Patient aware this process may take up to 6 weeks.   If approved medication must be filled through [facility] pharmacy and may be picked up or mailed to patient.       Subjective      Urinary Symptoms    Any history of:   Narrow-angle glaucoma? No   Impaired gastric emptying? No   Urinary retention? No     Bladder irritants (ex. caffeine, alcohol)? 1 cup of coffee a day   Last fluids before bedtime? 9 pm (bedtime 10 pm)  Frequently of bowel movement? Daily   Tobacco use? No   How many protective undergarments are you utilizing daily?   Using one small pad daily, was using largest pad before starting Gemtesa   What medications have been tried/stopped?   N/A  Current medication? Gemtesa   When started? December 2023  Side effects? Patient reports no more dry mouth now since switched to taking taking Gemtesa in morning  "  Improvement in symptoms? Yes, she has eliminated night-time wakening. It has also helped with symptoms during the day      Cardiovascular Health  The ASCVD Risk score (Luda DK, et al., 2019) failed to calculate for the following reasons:    The 2019 ASCVD risk score is only valid for ages 40 to 79    Lab Results   Component Value Date    CHOL 106 06/18/2024     Lab Results   Component Value Date    HDL 38.1 06/18/2024     Lab Results   Component Value Date    LDLCALC 39 06/18/2024     Lab Results   Component Value Date    TRIG 146 06/18/2024     No components found for: \"CHOLHDL\"        Blood Sugar Balance  Lab Results   Component Value Date    GLUCOSE 106 (H) 06/18/2024    HGBA1C 6.1 (H) 06/18/2024    HGBA1C 6.3 (A) 08/12/2022    HGBA1C 6.1 08/05/2021     No results found for: \"LEPTIN\", \"INSULFAST\", \"GLUF\"      Thyroid  Lab Results   Component Value Date    TSH 3.93 10/18/2023       Iron Status  No results found for: \"IRON\", \"TIBC\", \"FERRITIN\"     Kidney Function  Lab Results   Component Value Date    GFRF 78 04/19/2023    CREATININE 0.66 06/18/2024       Potassium  Lab Results   Component Value Date    K 4.0 06/18/2024        Vitamin D3  Lab Results   Component Value Date    VITD25 34 06/18/2024       Current Outpatient Medications on File Prior to Visit   Medication Sig Dispense Refill    aspirin 325 mg tablet Take 1 tablet (325 mg) by mouth once daily.      lisinopril 40 mg tablet Take 1 tablet (40 mg) by mouth once daily.      meclizine (Antivert) 25 mg tablet Take by mouth.      metoprolol succinate XL (Toprol-XL) 25 mg 24 hr tablet Take 1 tablet (25 mg) by mouth once daily. With 50 mg tablet once daily for total of 75 mg daily 90 tablet 3    metoprolol succinate XL (Toprol-XL) 50 mg 24 hr tablet Take 1 tablet (50 mg) by mouth once daily. With 25 mg tablet once daily for total of 75 mg daily 90 tablet 3    nitrofurantoin, macrocrystal-monohydrate, (Macrobid) 100 mg capsule Take 1 pill daily for 3 months 90 " capsule 0    vibegron (Gemtesa) 75 mg tablet Take 1 tablet (75 mg) by mouth once daily. 90 tablet 3     No current facility-administered medications on file prior to visit.        Medication and allergy reconciliation completed     Drug Interactions   No significant drug interactions identified    Assessment/Plan     Patient is experiencing urinary frequency, urgency, and incontinence.   Has tried before N/A  Patient approved for [facility].    Gemtesa   Discussed MOA: works by activating beta-3 adrenergic receptors in the bladder resulting in relaxation of the detrusor smooth muscle during the urine storage phase, thus increasing bladder capacity.  Provided education on administration and potential side effects including but not limited to hot flashes <2%, constipation/diarrhea <2%, dry mouth <2%, and headache <4%.   Gemtesa (vibegron) starts working almost immediately - within a few days of first taking it, with noticeable improvements in urinary urgency, frequency, and incontinence noted in clinical trials at 2 weeks which were reported as significant by 12 weeks.      LABS  Lab Results   Component Value Date    GFRF 78 04/19/2023     GFR 82 as of 6/18/24    CONTINUE  Gemtessa 75 mg once daily    Follow-up: 1/31/24 @ 10 am      Time spent with pt: Total length of time 15 (minutes) of the encounter and more than 50% was spent counseling the patient.     Ying Good, PharmD     Patient Assistance Program Approval:     We are pleased to inform you that your application for assistance has been approved.     This approval is valid through  7/23/2025  as long as the following criteria continue to be satisfied:     Your medication (Gemtesa) remains covered under your current insurance plan.   Your prescriber does not discontinue therapy.   You do not seek reimbursement from any other private or government-funded programs for the  medication.    Under this program, the pharmacy will first bill your insurance plan for  your indemnified specified medication. The [employer] will then offset your copay balance, so that your out-of pocket expense for your specialty medication will be $0.00.    Ying Good PharmD     Continue all meds under the continuation of care with the referring provider and clinical pharmacy team.    Verbal consent to manage patient's drug therapy was obtained from the patient and/or an individual authorized to act on behalf of a patient. They were informed they may decline to participate or withdraw from participation in pharmacy services at any time.

## 2024-08-15 DIAGNOSIS — I10 HYPERTENSION, ACCELERATED: Primary | ICD-10-CM

## 2024-08-15 RX ORDER — LISINOPRIL 40 MG/1
40 TABLET ORAL DAILY
Qty: 90 TABLET | Refills: 1 | Status: SHIPPED | OUTPATIENT
Start: 2024-08-15 | End: 2025-02-11

## 2024-09-01 NOTE — PROGRESS NOTES
CHIEF COMPLAINT: Virtual FUV             HISTORY OF PRESENT ILLNESS:  This is a 89 y.o. female, who presents with a virtual follow up visit. Patient is doing well. She states she took her last pill last night and has run out of the samples. Patient states she is having some stomach issues and a loss of appetite recently. She questioned if taking the medication is causing these side effects. She has not been getting up at night and she is only using one pad per day.              HISTORY OF PRESENT ILLNESS:           Past Medical History  She has a past medical history of Abnormal levels of other serum enzymes, Personal history of other (healed) physical injury and trauma, Personal history of other diseases of urinary system, Personal history of urinary (tract) infections, and Urinary bladder incontinence (09/25/2023).    Surgical History  She has a past surgical history that includes Other surgical history (01/21/2020); Other surgical history (01/21/2020); Other surgical history (01/21/2020); Other surgical history (01/21/2020); Other surgical history (01/21/2020); Other surgical history (01/21/2020); and Other surgical history (01/21/2020).     Social History  She reports that she has never smoked. She has never used smokeless tobacco. She reports that she does not drink alcohol and does not use drugs.    Family History  Family History   Problem Relation Name Age of Onset    Breast cancer Sister      Diabetes Brother      Prostate cancer Brother          Allergies  Patient has no known allergies.      A comprehensive 10+ review of systems was negative except for: see hpi                   Assessment:    89 yo with possible Tarah, OAB sx        Tarah:   No current infection   Standing culture ordered     OAB:    Great response with Gemtesa  Follow up in 3 months with Kathleen García MD   No indicators present

## 2024-09-09 DIAGNOSIS — N39.0 RECURRENT UTI: ICD-10-CM

## 2024-09-09 DIAGNOSIS — N32.81 OAB (OVERACTIVE BLADDER): ICD-10-CM

## 2024-09-11 ENCOUNTER — APPOINTMENT (OUTPATIENT)
Dept: UROLOGY | Facility: CLINIC | Age: 89
End: 2024-09-11
Payer: MEDICARE

## 2024-09-11 DIAGNOSIS — N32.81 OAB (OVERACTIVE BLADDER): Primary | ICD-10-CM

## 2024-09-11 PROCEDURE — 1157F ADVNC CARE PLAN IN RCRD: CPT

## 2024-09-11 PROCEDURE — G2211 COMPLEX E/M VISIT ADD ON: HCPCS

## 2024-09-11 PROCEDURE — 99213 OFFICE O/P EST LOW 20 MIN: CPT

## 2024-09-11 NOTE — PROGRESS NOTES
[facility]  Outpatient Clinic Note    Patient: Danika Demarco  Age/Sex: 89 y.o., female  MRN: [id number]    Chief Complaint: 3-month follow-up         History of Present Illness  This is a 89 y.o. female, who presents for follow-up for medication management.  The patient is currently taking Gemtesa and reports is 90% better with the medication.  Her symptoms have significantly decreased she would like to continue on this medication. She was approved by the  cost assistance pharmacy program the medication will be shipped to her home.  The patient denies dysuria, gross hematuria, flank pain, pelvic pain, fever or chills.  The patient stated that she has not had any reoccurrence of urinary tract infection symptoms.               Past Medical & Surgical History  Past Medical History:   Diagnosis Date    Abnormal levels of other serum enzymes     Elevated liver enzymes    Personal history of other (healed) physical injury and trauma     History of traumatic subdural hematoma    Personal history of other diseases of urinary system     History of hematuria    Personal history of urinary (tract) infections     History of urinary tract infection    Urinary bladder incontinence 09/25/2023     Past Surgical History:   Procedure Laterality Date    OTHER SURGICAL HISTORY  01/21/2020    Bladder surgery    OTHER SURGICAL HISTORY  01/21/2020    Hysterectomy    OTHER SURGICAL HISTORY  01/21/2020    Craniotomy    OTHER SURGICAL HISTORY  01/21/2020    Varicose vein ligation    OTHER SURGICAL HISTORY  01/21/2020    Cholecystectomy    OTHER SURGICAL HISTORY  01/21/2020    Hernia repair    OTHER SURGICAL HISTORY  01/21/2020    Oophorectomy bilateral       Family History  Family History   Problem Relation Name Age of Onset    Breast cancer Sister      Diabetes Brother      Prostate cancer Brother         Social History  She reports that she has never smoked. She has never been exposed to tobacco smoke. She has never used smokeless  tobacco. She reports current alcohol use. She reports that she does not use drugs.    Allergies  Patient has no known allergies.    Medications:  Current Outpatient Medications on File Prior to Visit   Medication Sig Dispense Refill    aspirin 325 mg tablet Take 1 tablet (325 mg) by mouth once daily.      lisinopril 40 mg tablet Take 1 tablet (40 mg) by mouth once daily. 90 tablet 1    meclizine (Antivert) 25 mg tablet Take by mouth.      metoprolol succinate XL (Toprol-XL) 25 mg 24 hr tablet Take 1 tablet (25 mg) by mouth once daily. With 50 mg tablet once daily for total of 75 mg daily 90 tablet 3    metoprolol succinate XL (Toprol-XL) 50 mg 24 hr tablet Take 1 tablet (50 mg) by mouth once daily. With 25 mg tablet once daily for total of 75 mg daily 90 tablet 3    nitrofurantoin, macrocrystal-monohydrate, (Macrobid) 100 mg capsule Take 1 pill daily for 3 months 90 capsule 0    vibegron (Gemtesa) 75 mg tablet Take 1 tablet (75 mg) by mouth once daily. 90 tablet 3     No current facility-administered medications on file prior to visit.        Review of Systems   A comprehensive 10+ review of systems was negative except for: see hpi          Physical Exam                                                                                                                      General: Well developed, well nourished, alert and cooperative, appears in no acute distress  Head: Normocephalic, atraumatic  Neck: supple, trachea midline  Eyes: Non-injected conjunctiva, sclera clear, no proptosis  Cardiac: Extremities are warm and well perfused. No edema, cyanosis or pallor.   Lungs: Breathing is easy, non-labored. Speaking in clear and complete sentences. Normal diaphragmatic movement.  Abdomen: soft, non-distended, non-tender, no rebound or guarding, no hernia and no CVA tenderness   MSK: Ambulatory with steady gait, unassisted  Neuro: alert and oriented to person, place and time  Psych: Demonstrates good judgement and reason,  without hallucinations, abnormal affect or abnormal behaviors.  Skin: no obvious lesions, no rashes      Labs  N/A    Imaging  N/A    IMPRESSION AND PLAN:  This is a 89 y.o. female, with possible Tarah, OAB symptoms.       Tarah:   -Standing culture in the  system     OAB:  -Great response with Gemtesa, continue taking once daily  -Patient was approved for the  clinical pharmacy program the medication will be sent to her     Follow up in 6 months     All questions and concerns were answered and addressed.  The patient expressed understanding and agrees with the plan.     Reviewed and approved by HUGH BANEGAS on 9/11/24 at 7:46 AM.

## 2024-10-11 ENCOUNTER — APPOINTMENT (OUTPATIENT)
Dept: CARDIOLOGY | Facility: CLINIC | Age: 89
End: 2024-10-11
Payer: MEDICARE

## 2024-10-16 PROCEDURE — RXMED WILLOW AMBULATORY MEDICATION CHARGE

## 2024-10-17 ENCOUNTER — PHARMACY VISIT (OUTPATIENT)
Dept: PHARMACY | Facility: CLINIC | Age: 89
End: 2024-10-17
Payer: COMMERCIAL

## 2024-11-12 ENCOUNTER — APPOINTMENT (OUTPATIENT)
Dept: CARDIOLOGY | Facility: HOSPITAL | Age: 89
End: 2024-11-12
Payer: MEDICARE

## 2024-11-12 VITALS
WEIGHT: 140 LBS | HEART RATE: 86 BPM | HEIGHT: 61 IN | DIASTOLIC BLOOD PRESSURE: 88 MMHG | SYSTOLIC BLOOD PRESSURE: 128 MMHG | BODY MASS INDEX: 26.43 KG/M2

## 2024-11-12 DIAGNOSIS — I48.0 AF (PAROXYSMAL ATRIAL FIBRILLATION) (MULTI): ICD-10-CM

## 2024-11-12 PROCEDURE — 1157F ADVNC CARE PLAN IN RCRD: CPT | Performed by: INTERNAL MEDICINE

## 2024-11-12 PROCEDURE — 99213 OFFICE O/P EST LOW 20 MIN: CPT | Performed by: INTERNAL MEDICINE

## 2024-11-12 PROCEDURE — 3079F DIAST BP 80-89 MM HG: CPT | Performed by: INTERNAL MEDICINE

## 2024-11-12 PROCEDURE — 3074F SYST BP LT 130 MM HG: CPT | Performed by: INTERNAL MEDICINE

## 2024-11-12 PROCEDURE — 93005 ELECTROCARDIOGRAM TRACING: CPT | Performed by: INTERNAL MEDICINE

## 2024-11-12 PROCEDURE — 1159F MED LIST DOCD IN RCRD: CPT | Performed by: INTERNAL MEDICINE

## 2024-11-12 PROCEDURE — 93010 ELECTROCARDIOGRAM REPORT: CPT | Performed by: INTERNAL MEDICINE

## 2024-11-12 PROCEDURE — 1036F TOBACCO NON-USER: CPT | Performed by: INTERNAL MEDICINE

## 2024-11-12 ASSESSMENT — ENCOUNTER SYMPTOMS
DEPRESSION: 0
OCCASIONAL FEELINGS OF UNSTEADINESS: 0
LOSS OF SENSATION IN FEET: 0

## 2024-11-12 NOTE — PROGRESS NOTES
"Counseling:  The patient was counseled regarding diagnostic results, instructions for management, risk factor reductions, prognosis, patient and family education, impressions, risks and benefits of treatment options and importance of compliance with treatment.      Chief Complaint:   The patient presents today for annual followup of a-fib, aortic stenosis and HTN.     History Of Present Illness:    Danika Demarco is a 89 year old female patient who presents today in the company of her  for annual followup of a-fib, aortic stenosis and HTN. Her PMH is significant for anxiety, mild aortic stenosis, ulcerative colitis, DM type 2, HTN, hyperlipidemia, atrial fibrillation, and h/o traumatic subdural hematoma after a fall. Over the past year, the patient states that she has done well from a cardiac standpoint. She denies any CP, chest discomfort or SOB. BP has been stable. EKG today shows a-fib with controlled ventricular response. The patient is compliant with her prescribed medications.     Last Recorded Vitals:  Vitals:    11/12/24 1409   BP: 128/88   Pulse: 86   Weight: 63.5 kg (140 lb)   Height: 1.549 m (5' 1\")       Past Surgical History:  She has a past surgical history that includes Other surgical history (01/21/2020); Other surgical history (01/21/2020); Other surgical history (01/21/2020); Other surgical history (01/21/2020); Other surgical history (01/21/2020); Other surgical history (01/21/2020); and Other surgical history (01/21/2020).      Social History:  She reports that she has never smoked. She has never been exposed to tobacco smoke. She has never used smokeless tobacco. She reports current alcohol use. She reports that she does not use drugs.    Family History:  Family History   Problem Relation Name Age of Onset    Breast cancer Sister      Diabetes Brother      Prostate cancer Brother         Allergies:  Patient has no known allergies.    Outpatient Medications:  Current Outpatient Medications "   Medication Instructions    aspirin 325 mg tablet 1 tablet, Daily    Gemtesa 75 mg, oral, Daily    lisinopril 40 mg, oral, Daily    meclizine (Antivert) 25 mg tablet Take by mouth.    metoprolol succinate XL (TOPROL-XL) 50 mg, oral, Daily, With 25 mg tablet once daily for total of 75 mg daily    metoprolol succinate XL (TOPROL-XL) 25 mg, oral, Daily, With 50 mg tablet once daily for total of 75 mg daily    nitrofurantoin, macrocrystal-monohydrate, (Macrobid) 100 mg capsule Take 1 pill daily for 3 months     Review of Systems   All other systems reviewed and are negative.     Physical Exam:  Constitutional:       Appearance: Healthy appearance. Not in distress.   Neck:      Vascular: No JVR. JVD normal.   Pulmonary:      Effort: Pulmonary effort is normal.      Breath sounds: Normal breath sounds. No wheezing. No rhonchi. No rales.   Chest:      Chest wall: Not tender to palpatation.   Cardiovascular:      PMI at left midclavicular line. Normal rate. Regular rhythm. Normal S1. Normal S2.       Murmurs: There is no murmur.      No gallop.  No click. No rub.   Pulses:     Intact distal pulses.   Edema:     Peripheral edema absent.   Abdominal:      General: Bowel sounds are normal.      Palpations: Abdomen is soft.      Tenderness: There is no abdominal tenderness.   Musculoskeletal: Normal range of motion.         General: No tenderness. Skin:     General: Skin is warm and dry.   Neurological:      General: No focal deficit present.      Mental Status: Alert and oriented to person, place and time.       Last Labs:  CBC -  Lab Results   Component Value Date    WBC 7.5 06/18/2024    HGB 15.0 06/18/2024    HCT 45.7 06/18/2024    MCV 96 06/18/2024     06/18/2024       CMP -  Lab Results   Component Value Date    CALCIUM 9.4 06/18/2024    PROT 7.4 06/18/2024    ALBUMIN 3.7 06/18/2024    AST 26 06/18/2024    ALT 16 06/18/2024    ALKPHOS 69 06/18/2024    BILITOT 1.0 06/18/2024       LIPID PANEL -   Lab Results    Component Value Date    CHOL 106 06/18/2024    TRIG 146 06/18/2024    HDL 38.1 06/18/2024    CHHDL 2.8 06/18/2024    LDLF 56 08/12/2022    VLDL 29 06/18/2024    NHDL 68 06/18/2024       RENAL FUNCTION PANEL -   Lab Results   Component Value Date    GLUCOSE 106 (H) 06/18/2024     06/18/2024    K 4.0 06/18/2024     06/18/2024    CO2 26 06/18/2024    ANIONGAP 13 06/18/2024    BUN 20 06/18/2024    CREATININE 0.66 06/18/2024    CALCIUM 9.4 06/18/2024    ALBUMIN 3.7 06/18/2024        Lab Results   Component Value Date     (H) 04/19/2023    HGBA1C 6.1 (H) 06/18/2024       Last Cardiology Tests:  05/11/2023 - TTE  1. Left ventricular systolic function is normal with a 60-65% estimated ejection fraction.  2. There is reduced right ventricular systolic function.  3. The left atrium is severely dilated.  4. Moderate mitral valve regurgitation.  5. Mildly elevated RVSP.  6. Moderate tricuspid regurgitation.  7. Mild aortic valve stenosis.    05/05/2022 - TTE  1. The left ventricular systolic function is normal with a 60-65% estimated ejection fraction.  2. There is moderate concentric left ventricular hypertrophy.  3. The left atrium is severely dilated.  4. Mild to moderate mitral valve regurgitation.  5. Mildly elevated RVSP.  6. Mild aortic valve stenosis.  7. There is moderate aortic valve cusp calcification.     Assessment/Plan   1) Atrial Fibrillation  Not on anticoagulation due to history of subdural hematoma  Plan is for rate control approach with metoprolol succinate 75 mg daily   D/W patient about risks of stroke vs subdural hematoma  Wishes to remain off anticoagulation for now  Denies CP, chest discomfort or SOB  In a-fib with controlled ventricular response  Continue current medical Rx   F/U 1 year - patient has requested virtual telephone followup     2) Aortic Stenosis  Mild AS on TTE May 2022  Mild AS on TTE May 2023   BP stable     3) Mitral Regurgitation  Mild to moderate MR on TTE May  2022  Moderate MR on TTE May 2023  BP stable     4) HTN  Stable  On lisinopril 40 mg daily, metoprolol succinate 75 mg   Continue current medical Rx   F/U 1 year - patient has requested virtual telephone followup       Scribe Attestation  By signing my name below, I, Lizzy Locke, Karly   attest that this documentation has been prepared under the direction and in the presence of Milton Ashby MD.

## 2024-11-12 NOTE — PATIENT INSTRUCTIONS
Continue all current medications as prescribed.  Followup with Dr. Ashby in 1 year, sooner should any issues or concerns arise before then. This can be done virtually via telephone.     If you have any questions or cardiac concerns, please call our office at [phone].

## 2024-12-26 DIAGNOSIS — I48.0 AF (PAROXYSMAL ATRIAL FIBRILLATION) (MULTI): ICD-10-CM

## 2024-12-30 RX ORDER — METOPROLOL SUCCINATE 25 MG/1
TABLET, EXTENDED RELEASE ORAL
Qty: 90 TABLET | Refills: 3 | Status: SHIPPED | OUTPATIENT
Start: 2024-12-30

## 2024-12-30 RX ORDER — METOPROLOL SUCCINATE 50 MG/1
TABLET, EXTENDED RELEASE ORAL
Qty: 90 TABLET | Refills: 3 | Status: SHIPPED | OUTPATIENT
Start: 2024-12-30

## 2025-01-14 PROCEDURE — RXMED WILLOW AMBULATORY MEDICATION CHARGE

## 2025-01-15 ENCOUNTER — PHARMACY VISIT (OUTPATIENT)
Dept: PHARMACY | Facility: CLINIC | Age: OVER 89
End: 2025-01-15
Payer: COMMERCIAL

## 2025-01-30 DIAGNOSIS — N32.81 OAB (OVERACTIVE BLADDER): Primary | ICD-10-CM

## 2025-01-31 ENCOUNTER — APPOINTMENT (OUTPATIENT)
Dept: PHARMACY | Facility: HOSPITAL | Age: OVER 89
End: 2025-01-31
Payer: MEDICARE

## 2025-02-01 LAB
25(OH)D3+25(OH)D2 SERPL-MCNC: 23 NG/ML (ref 30–100)
ALBUMIN SERPL-MCNC: 4 G/DL (ref 3.6–5.1)
ALBUMIN/CREAT UR: 10 MG/G CREAT
ALP SERPL-CCNC: 66 U/L (ref 37–153)
ALT SERPL-CCNC: 12 U/L (ref 6–29)
ANION GAP SERPL CALCULATED.4IONS-SCNC: 9 MMOL/L (CALC) (ref 7–17)
AST SERPL-CCNC: 23 U/L (ref 10–35)
BASOPHILS # BLD AUTO: 58 CELLS/UL (ref 0–200)
BASOPHILS NFR BLD AUTO: 0.8 %
BILIRUB SERPL-MCNC: 1.1 MG/DL (ref 0.2–1.2)
BUN SERPL-MCNC: 21 MG/DL (ref 7–25)
CALCIUM SERPL-MCNC: 9.5 MG/DL (ref 8.6–10.4)
CHLORIDE SERPL-SCNC: 104 MMOL/L (ref 98–110)
CHOLEST SERPL-MCNC: 112 MG/DL
CHOLEST/HDLC SERPL: 2.4 (CALC)
CO2 SERPL-SCNC: 28 MMOL/L (ref 20–32)
CREAT SERPL-MCNC: 0.67 MG/DL (ref 0.6–0.95)
CREAT UR-MCNC: 99 MG/DL (ref 20–275)
EGFRCR SERPLBLD CKD-EPI 2021: 81 ML/MIN/1.73M2
EOSINOPHIL # BLD AUTO: 168 CELLS/UL (ref 15–500)
EOSINOPHIL NFR BLD AUTO: 2.3 %
ERYTHROCYTE [DISTWIDTH] IN BLOOD BY AUTOMATED COUNT: 12.4 % (ref 11–15)
EST. AVERAGE GLUCOSE BLD GHB EST-MCNC: 126 MG/DL
EST. AVERAGE GLUCOSE BLD GHB EST-SCNC: 7 MMOL/L
GLUCOSE SERPL-MCNC: 100 MG/DL (ref 65–99)
HBA1C MFR BLD: 6 % OF TOTAL HGB
HCT VFR BLD AUTO: 45.8 % (ref 35–45)
HDLC SERPL-MCNC: 46 MG/DL
HGB BLD-MCNC: 15.1 G/DL (ref 11.7–15.5)
LDLC SERPL CALC-MCNC: 47 MG/DL (CALC)
LYMPHOCYTES # BLD AUTO: 2190 CELLS/UL (ref 850–3900)
LYMPHOCYTES NFR BLD AUTO: 30 %
MCH RBC QN AUTO: 32.5 PG (ref 27–33)
MCHC RBC AUTO-ENTMCNC: 33 G/DL (ref 32–36)
MCV RBC AUTO: 98.7 FL (ref 80–100)
MICROALBUMIN UR-MCNC: 1 MG/DL
MONOCYTES # BLD AUTO: 518 CELLS/UL (ref 200–950)
MONOCYTES NFR BLD AUTO: 7.1 %
NEUTROPHILS # BLD AUTO: 4365 CELLS/UL (ref 1500–7800)
NEUTROPHILS NFR BLD AUTO: 59.8 %
NONHDLC SERPL-MCNC: 66 MG/DL (CALC)
PLATELET # BLD AUTO: 236 THOUSAND/UL (ref 140–400)
PMV BLD REES-ECKER: 10.4 FL (ref 7.5–12.5)
POTASSIUM SERPL-SCNC: 4.3 MMOL/L (ref 3.5–5.3)
PROT SERPL-MCNC: 7.5 G/DL (ref 6.1–8.1)
RBC # BLD AUTO: 4.64 MILLION/UL (ref 3.8–5.1)
SODIUM SERPL-SCNC: 141 MMOL/L (ref 135–146)
TRIGL SERPL-MCNC: 107 MG/DL
TSH SERPL-ACNC: 2.3 MIU/L (ref 0.4–4.5)
VIT B12 SERPL-MCNC: 384 PG/ML (ref 200–1100)
WBC # BLD AUTO: 7.3 THOUSAND/UL (ref 3.8–10.8)

## 2025-02-02 DIAGNOSIS — I10 HYPERTENSION, ACCELERATED: ICD-10-CM

## 2025-02-04 RX ORDER — LISINOPRIL 40 MG/1
40 TABLET ORAL DAILY
Qty: 90 TABLET | Refills: 1 | Status: SHIPPED | OUTPATIENT
Start: 2025-02-04

## 2025-02-06 ENCOUNTER — APPOINTMENT (OUTPATIENT)
Dept: PHARMACY | Facility: HOSPITAL | Age: OVER 89
End: 2025-02-06
Payer: MEDICARE

## 2025-02-06 DIAGNOSIS — N32.81 OAB (OVERACTIVE BLADDER): ICD-10-CM

## 2025-02-06 NOTE — PROGRESS NOTES
Patient ID: Danika Demarco is a 89 y.o. female who presents for Overactive bladder.    Referring Provider: Kathleen Baez PA-C  Pt was referred for cost assistance for Gemtesa     Kathleen Baez has left . Reached out to Dr. García agrees with continuing the referral under his name. From today forward Referral will be under Dr. García.    Preferred Pharmacy:    [facility]  [address] [facility] [address]  Phone: [phone] Fax: [phone]    [facility] [place], [place] - [address]  [address] [facility] [address]  Phone: [phone] Fax: [phone]    Pending sale to [facility]  [address], [address]  [facility] [address]  Phone: [phone] Fax: [phone]      Copay assistance:   Do you have copays on your medications? Yes  Do you have trouble affording your current medications? Yes     Patient Assistance Screening (VAF)    Patient verbally reports monthly or yearly income which is less than 400% federal poverty level   Application for program has been submitted for the following medications:   Gemtesa   Patient has been informed that program team will be reaching out to them to discuss necessary documentation, instructed to answer phone/return voicemail.   Patient aware this process may take up to 6 weeks.   If approved medication must be filled through [facility] pharmacy and may be picked up or mailed to patient.       Subjective      Urinary Symptoms    Any history of:   Narrow-angle glaucoma? No   Impaired gastric emptying? No   Urinary retention? No     Bladder irritants (ex. caffeine, alcohol)? 1 cup of coffee a day   Last fluids before bedtime? 9 pm (bedtime 10 pm)  Frequently of bowel movement? Daily   Tobacco use? No   How many protective undergarments are you utilizing daily?   Using one small pad daily, was using largest pad before starting Gemtesa   What medications have been tried/stopped?   N/A  Current  "medication? Gemtesa   When started? December 2023  Side effects? No  Improvement in symptoms? Yes      Cardiovascular Health  The ASCVD Risk score (Luda PILLAI, et al., 2019) failed to calculate for the following reasons:    The 2019 ASCVD risk score is only valid for ages 40 to 79    Lab Results   Component Value Date    CHOL 112 01/31/2025     Lab Results   Component Value Date    HDL 46 (L) 01/31/2025     Lab Results   Component Value Date    LDLCALC 47 01/31/2025     Lab Results   Component Value Date    TRIG 107 01/31/2025     No components found for: \"CHOLHDL\"        Blood Sugar Balance  Lab Results   Component Value Date    GLUCOSE 100 (H) 01/31/2025    HGBA1C 6.0 (H) 01/31/2025    HGBA1C 6.1 (H) 06/18/2024    HGBA1C 6.3 (A) 08/12/2022     No results found for: \"LEPTIN\", \"INSULFAST\", \"GLUF\"      Thyroid  Lab Results   Component Value Date    TSH 2.30 01/31/2025       Iron Status  No results found for: \"IRON\", \"TIBC\", \"FERRITIN\"     Kidney Function  Lab Results   Component Value Date    GFRF 78 04/19/2023    CREATININE 0.67 01/31/2025       Potassium  Lab Results   Component Value Date    K 4.3 01/31/2025        Vitamin D3  Lab Results   Component Value Date    VITD25 23 (L) 01/31/2025       Current Outpatient Medications on File Prior to Visit   Medication Sig Dispense Refill    aspirin 325 mg tablet Take 1 tablet (325 mg) by mouth once daily.      lisinopril 40 mg tablet Take 1 tablet by mouth once daily 90 tablet 1    meclizine (Antivert) 25 mg tablet Take by mouth.      metoprolol succinate XL (Toprol-XL) 25 mg 24 hr tablet TAKE 1 TABLET BY MOUTH ONCE DAILY WITH  50MG  TAB  TO  TOTAL  75MG 90 tablet 3    metoprolol succinate XL (Toprol-XL) 50 mg 24 hr tablet TAKE 1 TABLET BY MOUTH ONCE DAILY WITH  25MG  TAB  TO  TOTAL  75MG 90 tablet 3    nitrofurantoin, macrocrystal-monohydrate, (Macrobid) 100 mg capsule Take 1 pill daily for 3 months 90 capsule 0    vibegron (Gemtesa) 75 mg tablet Take 1 tablet (75 mg) by " mouth once daily. 90 tablet 3    [DISCONTINUED] lisinopril 40 mg tablet Take 1 tablet (40 mg) by mouth once daily. 90 tablet 1     No current facility-administered medications on file prior to visit.        Medication and allergy reconciliation completed     Drug Interactions   No significant drug interactions identified    Assessment/Plan     Patient is experiencing urinary frequency, urgency, and incontinence. She reports doing much better since starting Gemtesa. She is using significantly less pads than before. Will continue Gemtesa through PAP to continue improvement in her urinary symptoms. We did discuss that since her provider left she should set up a follow up appointment with Dr. García (he is taking over) in the next several months. Patient agreeable and plans to call and get something set up before our next follow up.  Has tried before N/A  Patient approved for [facility].    Gemtesa   Discussed MOA: works by activating beta-3 adrenergic receptors in the bladder resulting in relaxation of the detrusor smooth muscle during the urine storage phase, thus increasing bladder capacity.  Provided education on administration and potential side effects including but not limited to hot flashes <2%, constipation/diarrhea <2%, dry mouth <2%, and headache <4%.   Gemtesa (vibegron) starts working almost immediately - within a few days of first taking it, with noticeable improvements in urinary urgency, frequency, and incontinence noted in clinical trials at 2 weeks which were reported as significant by 12 weeks.      LABS  Lab Results   Component Value Date    GFRF 78 04/19/2023     GFR 81 as of 1/31/2025    CONTINUE  Gemtessa 75 mg once daily    Follow-up: 6/5/25 @ 10 am      Time spent with pt: Total length of time 15 (minutes) of the encounter and more than 50% was spent counseling the patient.     Ying Good, PharmD     Patient Assistance Program Approval:     We are pleased to inform you that your application  for assistance has been approved.     This approval is valid through  7/23/2025  as long as the following criteria continue to be satisfied:     Your medication (Gemtesa) remains covered under your current insurance plan.   Your prescriber does not discontinue therapy.   You do not seek reimbursement from any other private or government-funded programs for the  medication.    Under this program, the pharmacy will first bill your insurance plan for your indemnified specified medication. The [employer] will then offset your copay balance, so that your out-of pocket expense for your specialty medication will be $0.00.    Ying Good PharmD     Continue all meds under the continuation of care with the referring provider and clinical pharmacy team.    Verbal consent to manage patient's drug therapy was obtained from the patient and/or an individual authorized to act on behalf of a patient. They were informed they may decline to participate or withdraw from participation in pharmacy services at any time.

## 2025-02-11 ENCOUNTER — TELEPHONE (OUTPATIENT)
Dept: PRIMARY CARE | Facility: CLINIC | Age: OVER 89
End: 2025-02-11

## 2025-02-11 ENCOUNTER — APPOINTMENT (OUTPATIENT)
Dept: PRIMARY CARE | Facility: CLINIC | Age: OVER 89
End: 2025-02-11
Payer: MEDICARE

## 2025-02-11 NOTE — TELEPHONE ENCOUNTER
Pt called in to cancel her and her husbands appointment for today 2/11/2025 hers was at 11:30 AM and his was at 12:00 pm she was wondering if you could call her and go over there lab results with them .     Please advise.

## 2025-04-14 PROCEDURE — RXMED WILLOW AMBULATORY MEDICATION CHARGE

## 2025-04-15 ENCOUNTER — PHARMACY VISIT (OUTPATIENT)
Dept: PHARMACY | Facility: CLINIC | Age: OVER 89
End: 2025-04-15
Payer: COMMERCIAL

## 2025-05-18 ENCOUNTER — ANCILLARY PROCEDURE (OUTPATIENT)
Dept: URGENT CARE | Age: OVER 89
End: 2025-05-18
Payer: MEDICARE

## 2025-05-18 ENCOUNTER — OFFICE VISIT (OUTPATIENT)
Dept: URGENT CARE | Age: OVER 89
End: 2025-05-18
Payer: MEDICARE

## 2025-05-18 VITALS
SYSTOLIC BLOOD PRESSURE: 163 MMHG | BODY MASS INDEX: 26.83 KG/M2 | OXYGEN SATURATION: 97 % | DIASTOLIC BLOOD PRESSURE: 90 MMHG | HEART RATE: 73 BPM | RESPIRATION RATE: 15 BRPM | TEMPERATURE: 97.9 F | WEIGHT: 142 LBS

## 2025-05-18 DIAGNOSIS — R07.81 RIB PAIN ON RIGHT SIDE: Primary | ICD-10-CM
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
## 2025-05-18 PROCEDURE — 71101 X-RAY EXAM UNILAT RIBS/CHEST: CPT | Mod: RIGHT SIDE | Performed by: NURSE PRACTITIONER

## 2025-05-18 PROCEDURE — 3080F DIAST BP >= 90 MM HG: CPT | Performed by: NURSE PRACTITIONER

## 2025-05-18 PROCEDURE — 1159F MED LIST DOCD IN RCRD: CPT | Performed by: NURSE PRACTITIONER

## 2025-05-18 PROCEDURE — 3077F SYST BP >= 140 MM HG: CPT | Performed by: NURSE PRACTITIONER

## 2025-05-18 PROCEDURE — 99203 OFFICE O/P NEW LOW 30 MIN: CPT | Performed by: NURSE PRACTITIONER

## 2025-05-18 PROCEDURE — 1036F TOBACCO NON-USER: CPT | Performed by: NURSE PRACTITIONER

## 2025-05-18 PROCEDURE — 1157F ADVNC CARE PLAN IN RCRD: CPT | Performed by: NURSE PRACTITIONER

## 2025-05-18 RX ORDER — LIDOCAINE 50 MG/G
1 PATCH TOPICAL DAILY
Qty: 10 PATCH | Refills: 0 | Status: ON HOLD | OUTPATIENT
Start: 2025-05-18 | End: 2026-05-18

## 2025-05-18 ASSESSMENT — ENCOUNTER SYMPTOMS
DIARRHEA: 0
COUGH: 0
ABDOMINAL PAIN: 0
DEPRESSION: 0
NAUSEA: 0
SHORTNESS OF BREATH: 0
MYALGIAS: 0
VOMITING: 0
WHEEZING: 0
HEADACHES: 0
FATIGUE: 0
LOSS OF CONSCIOUSNESS: 0
FEVER: 0
RHINORRHEA: 0
PAIN: 1
SORE THROAT: 0
OCCASIONAL FEELINGS OF UNSTEADINESS: 1
LOSS OF SENSATION IN FEET: 0

## 2025-05-18 ASSESSMENT — PATIENT HEALTH QUESTIONNAIRE - PHQ9
1. LITTLE INTEREST OR PLEASURE IN DOING THINGS: NOT AT ALL
SUM OF ALL RESPONSES TO PHQ9 QUESTIONS 1 AND 2: 0
2. FEELING DOWN, DEPRESSED OR HOPELESS: NOT AT ALL

## 2025-05-18 NOTE — PROGRESS NOTES
Subjective   Patient ID: Danika Demarco is a 89 y.o. female. They present today with a chief complaint of Pain (RT side ribs when she coughs after falling earlier today).    History of Present Illness  Daughter is present in the room.  89-year-old female presents with right rib pain for 4 hours.  Patient was sitting in a chair and was reaching over to the side to get something, then the chair tipped over making her fall onto the floor. She landed on her right rib. The pain started right away. Patient denies head injury and loss of consciousness.  Patient denies shortness of breath wheezing      History provided by:  Patient   used: No    Pain  Location:  Right ribs  Quality:  Intermittent sharp pain worsened with bending the torso.  Severity:  Moderate  Onset quality:  Sudden  Duration:  4 hours  Timing:  Intermittent  Progression:  Waxing and waning  Chronicity:  New  Relieved by:  Tylenol  Worsened by:  Certain movement Such as bending of the torso  Associated symptoms: no abdominal pain, no chest pain, no congestion, no cough, no diarrhea, no ear pain, no fatigue, no fever, no headaches, no loss of consciousness, no myalgias, no nausea, no rash, no rhinorrhea, no shortness of breath, no sore throat, no vomiting and no wheezing        Past Medical History  Allergies as of 05/18/2025    (No Known Allergies)       Prescriptions Prior to Admission[1]     Medical History[2]    Surgical History[3]     reports that she has never smoked. She has never been exposed to tobacco smoke. She has never used smokeless tobacco. She reports current alcohol use. She reports that she does not use drugs.    Review of Systems  Review of Systems   Constitutional:  Negative for fatigue and fever.   HENT:  Negative for congestion, ear pain, rhinorrhea and sore throat.    Respiratory:  Negative for cough, shortness of breath and wheezing.    Cardiovascular:  Negative for chest pain.   Gastrointestinal:  Negative for  abdominal pain, diarrhea, nausea and vomiting.   Musculoskeletal:  Negative for myalgias.        Right rib pain   Skin:  Negative for rash.   Neurological:  Negative for loss of consciousness and headaches.                                  Objective    Vitals:    05/18/25 1313   BP: 163/90   Pulse: 73   Resp: 15   Temp: 36.6 °C (97.9 °F)   TempSrc: Oral   SpO2: 97%   Weight: 64.4 kg (142 lb)     No LMP recorded. Patient is postmenopausal.    Physical Exam  Vitals and nursing note reviewed.   Constitutional:       Appearance: Normal appearance.   HENT:      Head: Normocephalic and atraumatic.   Cardiovascular:      Rate and Rhythm: Normal rate and regular rhythm.   Pulmonary:      Effort: Pulmonary effort is normal.      Breath sounds: Normal breath sounds.   Chest:      Comments: The skin on the right side of the ribs without obvious deformity, ecchymosis, redness, swelling, bleeding or discharge.  Neurological:      Mental Status: She is alert.         Procedures    Point of Care Test & Imaging Results from this visit  No results found for this visit on 05/18/25.   Imaging  XR ribs right 2 views w chest pa or ap  Result Date: 5/18/2025  No rib fracture seen. Limited by osteopenia. No pneumothorax.   Emphysematous changes.     MACRO: None   Signed by: Yfn Mai 5/18/2025 2:53 PM Dictation workstation:   [id number]      Cardiology, Vascular, and Other Imaging  No other imaging results found for the past 2 days      Diagnostic study results (if any) were reviewed by DELIA River.    Assessment/Plan   Allergies, medications, history, and pertinent labs/EKGs/Imaging reviewed by DELIA River.     Medical Decision Making  Negative x-ray.  X-rays showed osteopenia  Use the lidocaine patches as needed as instructed.    Rest and use cold or warm compress as needed.    Take Tylenol as needed for aches and pain.  Pain should improve in 14-6 weeks.  Referred to orthopedist.  If symptoms do not improve,  advised to return and/or follow-up with PCP.  Call 911 or go to the nearest ER if the symptoms worsen.  Patient and daugter verbalized understanding of these instructions. Pt left in stable condition.       Orders and Diagnoses  Diagnoses and all orders for this visit:  Rib pain on right side  -     XR ribs right 2 views w chest pa or ap  -     lidocaine (Lidoderm) 5 % patch; Place 1 patch over 12 hours on the skin once daily. Apply to painful area 12 hours per day, remove for 12 hours.  -     Referral to Orthopedics and Sports Medicine; Future  Fall, initial encounter  -     XR ribs right 2 views w chest pa or ap  -     lidocaine (Lidoderm) 5 % patch; Place 1 patch over 12 hours on the skin once daily. Apply to painful area 12 hours per day, remove for 12 hours.  -     Referral to Orthopedics and Sports Medicine; Future      Medical Admin Record      Patient disposition: Home    Electronically signed by DELIA River  7:41 PM           [1] (Not in a hospital admission)   [2]   Past Medical History:  Diagnosis Date    Abnormal levels of other serum enzymes     Elevated liver enzymes    Personal history of other (healed) physical injury and trauma     History of traumatic subdural hematoma    Personal history of other diseases of urinary system     History of hematuria    Personal history of urinary (tract) infections     History of urinary tract infection    Urinary bladder incontinence 09/25/2023   [3]   Past Surgical History:  Procedure Laterality Date    OTHER SURGICAL HISTORY  01/21/2020    Bladder surgery    OTHER SURGICAL HISTORY  01/21/2020    Hysterectomy    OTHER SURGICAL HISTORY  01/21/2020    Craniotomy    OTHER SURGICAL HISTORY  01/21/2020    Varicose vein ligation    OTHER SURGICAL HISTORY  01/21/2020    Cholecystectomy    OTHER SURGICAL HISTORY  01/21/2020    Hernia repair    OTHER SURGICAL HISTORY  01/21/2020    Oophorectomy bilateral

## 2025-05-21 ENCOUNTER — APPOINTMENT (OUTPATIENT)
Dept: RADIOLOGY | Facility: HOSPITAL | Age: OVER 89
DRG: 062 | End: 2025-05-21
Payer: MEDICARE

## 2025-05-21 ENCOUNTER — HOSPITAL ENCOUNTER (INPATIENT)
Facility: HOSPITAL | Age: OVER 89
DRG: 062 | End: 2025-05-21
Attending: EMERGENCY MEDICINE | Admitting: INTERNAL MEDICINE
Payer: MEDICARE

## 2025-05-21 DIAGNOSIS — I63.512 CEREBRAL INFARCTION DUE TO UNSPECIFIED OCCLUSION OR STENOSIS OF LEFT MIDDLE CEREBRAL ARTERY (MULTI): ICD-10-CM

## 2025-05-21 DIAGNOSIS — I10 HYPERTENSION, ACCELERATED: ICD-10-CM

## 2025-05-21 DIAGNOSIS — N30.00 ACUTE CYSTITIS WITHOUT HEMATURIA: ICD-10-CM

## 2025-05-21 DIAGNOSIS — I63.9 CEREBROVASCULAR ACCIDENT (CVA), UNSPECIFIED MECHANISM (MULTI): Primary | ICD-10-CM

## 2025-05-21 LAB
ALBUMIN SERPL BCP-MCNC: 3.6 G/DL (ref 3.4–5)
ALP SERPL-CCNC: 63 U/L (ref 33–136)
ALT SERPL W P-5'-P-CCNC: 12 U/L (ref 7–45)
ANION GAP SERPL CALC-SCNC: 17 MMOL/L (ref 10–20)
APTT PPP: 23 SECONDS (ref 26–36)
AST SERPL W P-5'-P-CCNC: 27 U/L (ref 9–39)
BASOPHILS # BLD AUTO: 0.06 X10*3/UL (ref 0–0.1)
BASOPHILS NFR BLD AUTO: 0.8 %
BILIRUB SERPL-MCNC: 0.8 MG/DL (ref 0–1.2)
BNP SERPL-MCNC: 171 PG/ML (ref 0–99)
BUN SERPL-MCNC: 19 MG/DL (ref 6–23)
CALCIUM SERPL-MCNC: 8.7 MG/DL (ref 8.6–10.3)
CARDIAC TROPONIN I PNL SERPL HS: 10 NG/L (ref 0–13)
CHLORIDE SERPL-SCNC: 101 MMOL/L (ref 98–107)
CO2 SERPL-SCNC: 25 MMOL/L (ref 21–32)
CREAT SERPL-MCNC: 0.6 MG/DL (ref 0.5–1.05)
EGFRCR SERPLBLD CKD-EPI 2021: 83 ML/MIN/1.73M*2
EOSINOPHIL # BLD AUTO: 0.12 X10*3/UL (ref 0–0.4)
EOSINOPHIL NFR BLD AUTO: 1.6 %
ERYTHROCYTE [DISTWIDTH] IN BLOOD BY AUTOMATED COUNT: 13.5 % (ref 11.5–14.5)
GLUCOSE BLD MANUAL STRIP-MCNC: 109 MG/DL (ref 74–99)
GLUCOSE BLD MANUAL STRIP-MCNC: 96 MG/DL (ref 74–99)
GLUCOSE SERPL-MCNC: 105 MG/DL (ref 74–99)
HCT VFR BLD AUTO: 43.8 % (ref 36–46)
HGB BLD-MCNC: 14.4 G/DL (ref 12–16)
IMM GRANULOCYTES # BLD AUTO: 0.02 X10*3/UL (ref 0–0.5)
IMM GRANULOCYTES NFR BLD AUTO: 0.3 % (ref 0–0.9)
INR PPP: 1.1 (ref 0.9–1.1)
LYMPHOCYTES # BLD AUTO: 1.27 X10*3/UL (ref 0.8–3)
LYMPHOCYTES NFR BLD AUTO: 17.4 %
MCH RBC QN AUTO: 31.8 PG (ref 26–34)
MCHC RBC AUTO-ENTMCNC: 32.9 G/DL (ref 32–36)
MCV RBC AUTO: 97 FL (ref 80–100)
MONOCYTES # BLD AUTO: 0.61 X10*3/UL (ref 0.05–0.8)
MONOCYTES NFR BLD AUTO: 8.3 %
NEUTROPHILS # BLD AUTO: 5.23 X10*3/UL (ref 1.6–5.5)
NEUTROPHILS NFR BLD AUTO: 71.6 %
NRBC BLD-RTO: 0 /100 WBCS (ref 0–0)
PLATELET # BLD AUTO: 159 X10*3/UL (ref 150–450)
POTASSIUM SERPL-SCNC: 4.6 MMOL/L (ref 3.5–5.3)
PROT SERPL-MCNC: 6.9 G/DL (ref 6.4–8.2)
PROTHROMBIN TIME: 12.7 SECONDS (ref 9.8–12.4)
RBC # BLD AUTO: 4.53 X10*6/UL (ref 4–5.2)
SODIUM SERPL-SCNC: 138 MMOL/L (ref 136–145)
WBC # BLD AUTO: 7.3 X10*3/UL (ref 4.4–11.3)

## 2025-05-21 PROCEDURE — 36415 COLL VENOUS BLD VENIPUNCTURE: CPT | Performed by: EMERGENCY MEDICINE

## 2025-05-21 PROCEDURE — 80053 COMPREHEN METABOLIC PANEL: CPT | Performed by: EMERGENCY MEDICINE

## 2025-05-21 PROCEDURE — 71045 X-RAY EXAM CHEST 1 VIEW: CPT

## 2025-05-21 PROCEDURE — 99285 EMERGENCY DEPT VISIT HI MDM: CPT | Mod: 25 | Performed by: EMERGENCY MEDICINE

## 2025-05-21 PROCEDURE — 3E03317 INTRODUCTION OF OTHER THROMBOLYTIC INTO PERIPHERAL VEIN, PERCUTANEOUS APPROACH: ICD-10-PCS | Performed by: INTERNAL MEDICINE

## 2025-05-21 PROCEDURE — 2020000001 HC ICU ROOM DAILY

## 2025-05-21 PROCEDURE — 99291 CRITICAL CARE FIRST HOUR: CPT | Mod: 25 | Performed by: EMERGENCY MEDICINE

## 2025-05-21 PROCEDURE — 70450 CT HEAD/BRAIN W/O DYE: CPT

## 2025-05-21 PROCEDURE — 99223 1ST HOSP IP/OBS HIGH 75: CPT

## 2025-05-21 PROCEDURE — 85730 THROMBOPLASTIN TIME PARTIAL: CPT | Performed by: EMERGENCY MEDICINE

## 2025-05-21 PROCEDURE — 82947 ASSAY GLUCOSE BLOOD QUANT: CPT

## 2025-05-21 PROCEDURE — 2550000001 HC RX 255 CONTRASTS: Mod: JZ | Performed by: EMERGENCY MEDICINE

## 2025-05-21 PROCEDURE — 84484 ASSAY OF TROPONIN QUANT: CPT | Performed by: EMERGENCY MEDICINE

## 2025-05-21 PROCEDURE — 70498 CT ANGIOGRAPHY NECK: CPT | Performed by: STUDENT IN AN ORGANIZED HEALTH CARE EDUCATION/TRAINING PROGRAM

## 2025-05-21 PROCEDURE — 85610 PROTHROMBIN TIME: CPT | Performed by: EMERGENCY MEDICINE

## 2025-05-21 PROCEDURE — 71045 X-RAY EXAM CHEST 1 VIEW: CPT | Performed by: RADIOLOGY

## 2025-05-21 PROCEDURE — 70498 CT ANGIOGRAPHY NECK: CPT

## 2025-05-21 PROCEDURE — 70450 CT HEAD/BRAIN W/O DYE: CPT | Performed by: RADIOLOGY

## 2025-05-21 PROCEDURE — 85025 COMPLETE CBC W/AUTO DIFF WBC: CPT | Performed by: EMERGENCY MEDICINE

## 2025-05-21 PROCEDURE — 83880 ASSAY OF NATRIURETIC PEPTIDE: CPT

## 2025-05-21 PROCEDURE — 2500000004 HC RX 250 GENERAL PHARMACY W/ HCPCS (ALT 636 FOR OP/ED): Performed by: EMERGENCY MEDICINE

## 2025-05-21 PROCEDURE — 70496 CT ANGIOGRAPHY HEAD: CPT | Performed by: STUDENT IN AN ORGANIZED HEALTH CARE EDUCATION/TRAINING PROGRAM

## 2025-05-21 PROCEDURE — 99291 CRITICAL CARE FIRST HOUR: CPT

## 2025-05-21 PROCEDURE — G0427 INPT/ED TELECONSULT70: HCPCS | Performed by: STUDENT IN AN ORGANIZED HEALTH CARE EDUCATION/TRAINING PROGRAM

## 2025-05-21 PROCEDURE — 96374 THER/PROPH/DIAG INJ IV PUSH: CPT

## 2025-05-21 PROCEDURE — 2500000004 HC RX 250 GENERAL PHARMACY W/ HCPCS (ALT 636 FOR OP/ED)

## 2025-05-21 PROCEDURE — 2500000001 HC RX 250 WO HCPCS SELF ADMINISTERED DRUGS (ALT 637 FOR MEDICARE OP)

## 2025-05-21 RX ORDER — HYDRALAZINE HYDROCHLORIDE 25 MG/1
25 TABLET, FILM COATED ORAL EVERY 6 HOURS PRN
Status: DISCONTINUED | OUTPATIENT
Start: 2025-05-23 | End: 2025-05-23

## 2025-05-21 RX ORDER — PANTOPRAZOLE SODIUM 40 MG/10ML
40 INJECTION, POWDER, LYOPHILIZED, FOR SOLUTION INTRAVENOUS
Status: DISCONTINUED | OUTPATIENT
Start: 2025-05-22 | End: 2025-05-28 | Stop reason: HOSPADM

## 2025-05-21 RX ORDER — ATORVASTATIN CALCIUM 40 MG/1
40 TABLET, FILM COATED ORAL NIGHTLY
Status: DISCONTINUED | OUTPATIENT
Start: 2025-05-21 | End: 2025-05-28 | Stop reason: HOSPADM

## 2025-05-21 RX ORDER — DEXTROSE 50 % IN WATER (D50W) INTRAVENOUS SYRINGE
12.5
Status: DISCONTINUED | OUTPATIENT
Start: 2025-05-21 | End: 2025-05-28 | Stop reason: HOSPADM

## 2025-05-21 RX ORDER — LABETALOL HYDROCHLORIDE 5 MG/ML
10 INJECTION, SOLUTION INTRAVENOUS EVERY 10 MIN PRN
Status: DISCONTINUED | OUTPATIENT
Start: 2025-05-21 | End: 2025-05-28 | Stop reason: HOSPADM

## 2025-05-21 RX ORDER — ACETAMINOPHEN 325 MG/1
650 TABLET ORAL EVERY 4 HOURS PRN
Status: DISCONTINUED | OUTPATIENT
Start: 2025-05-21 | End: 2025-05-28 | Stop reason: HOSPADM

## 2025-05-21 RX ORDER — LISINOPRIL 20 MG/1
40 TABLET ORAL DAILY
Status: DISCONTINUED | OUTPATIENT
Start: 2025-05-21 | End: 2025-05-28 | Stop reason: HOSPADM

## 2025-05-21 RX ORDER — ONDANSETRON HYDROCHLORIDE 2 MG/ML
4 INJECTION, SOLUTION INTRAVENOUS EVERY 6 HOURS PRN
Status: DISCONTINUED | OUTPATIENT
Start: 2025-05-21 | End: 2025-05-28 | Stop reason: HOSPADM

## 2025-05-21 RX ORDER — METOPROLOL SUCCINATE 25 MG/1
25 TABLET, EXTENDED RELEASE ORAL DAILY
Status: DISCONTINUED | OUTPATIENT
Start: 2025-05-21 | End: 2025-05-28 | Stop reason: HOSPADM

## 2025-05-21 RX ORDER — DEXTROSE 50 % IN WATER (D50W) INTRAVENOUS SYRINGE
25
Status: DISCONTINUED | OUTPATIENT
Start: 2025-05-21 | End: 2025-05-28 | Stop reason: HOSPADM

## 2025-05-21 RX ORDER — LABETALOL HYDROCHLORIDE 5 MG/ML
10 INJECTION, SOLUTION INTRAVENOUS ONCE
Status: DISCONTINUED | OUTPATIENT
Start: 2025-05-21 | End: 2025-05-23

## 2025-05-21 RX ORDER — METOPROLOL SUCCINATE 50 MG/1
50 TABLET, EXTENDED RELEASE ORAL DAILY
Status: DISCONTINUED | OUTPATIENT
Start: 2025-05-21 | End: 2025-05-28 | Stop reason: HOSPADM

## 2025-05-21 RX ORDER — HYDRALAZINE HYDROCHLORIDE 20 MG/ML
10 INJECTION INTRAMUSCULAR; INTRAVENOUS
Status: DISCONTINUED | OUTPATIENT
Start: 2025-05-21 | End: 2025-05-23

## 2025-05-21 RX ORDER — ATORVASTATIN CALCIUM 40 MG/1
40 TABLET, FILM COATED ORAL NIGHTLY
Status: DISCONTINUED | OUTPATIENT
Start: 2025-05-21 | End: 2025-05-21

## 2025-05-21 RX ORDER — POLYETHYLENE GLYCOL 3350 17 G/17G
17 POWDER, FOR SOLUTION ORAL DAILY
Status: DISCONTINUED | OUTPATIENT
Start: 2025-05-22 | End: 2025-05-28 | Stop reason: HOSPADM

## 2025-05-21 RX ORDER — PANTOPRAZOLE SODIUM 40 MG/1
40 TABLET, DELAYED RELEASE ORAL
Status: DISCONTINUED | OUTPATIENT
Start: 2025-05-22 | End: 2025-05-28 | Stop reason: HOSPADM

## 2025-05-21 RX ADMIN — IOHEXOL 100 ML: 350 INJECTION, SOLUTION INTRAVENOUS at 15:52

## 2025-05-21 RX ADMIN — ATORVASTATIN CALCIUM 40 MG: 40 TABLET, FILM COATED ORAL at 21:14

## 2025-05-21 RX ADMIN — LABETALOL HYDROCHLORIDE 10 MG: 5 INJECTION INTRAVENOUS at 20:11

## 2025-05-21 RX ADMIN — Medication 17 MG: at 16:02

## 2025-05-21 SDOH — SOCIAL STABILITY: SOCIAL INSECURITY: HAS ANYONE EVER THREATENED TO HURT YOUR FAMILY OR YOUR PETS?: NO

## 2025-05-21 SDOH — SOCIAL STABILITY: SOCIAL INSECURITY: HAVE YOU HAD ANY THOUGHTS OF HARMING ANYONE ELSE?: NO

## 2025-05-21 SDOH — SOCIAL STABILITY: SOCIAL INSECURITY: ARE YOU OR HAVE YOU BEEN THREATENED OR ABUSED PHYSICALLY, EMOTIONALLY, OR SEXUALLY BY ANYONE?: NO

## 2025-05-21 SDOH — SOCIAL STABILITY: SOCIAL INSECURITY: WERE YOU ABLE TO COMPLETE ALL THE BEHAVIORAL HEALTH SCREENINGS?: YES

## 2025-05-21 SDOH — SOCIAL STABILITY: SOCIAL INSECURITY: DO YOU FEEL UNSAFE GOING BACK TO THE PLACE WHERE YOU ARE LIVING?: NO

## 2025-05-21 SDOH — SOCIAL STABILITY: SOCIAL INSECURITY: ABUSE: ADULT

## 2025-05-21 SDOH — SOCIAL STABILITY: SOCIAL INSECURITY: HAVE YOU HAD THOUGHTS OF HARMING ANYONE ELSE?: NO

## 2025-05-21 SDOH — SOCIAL STABILITY: SOCIAL INSECURITY: ARE THERE ANY APPARENT SIGNS OF INJURIES/BEHAVIORS THAT COULD BE RELATED TO ABUSE/NEGLECT?: NO

## 2025-05-21 SDOH — SOCIAL STABILITY: SOCIAL INSECURITY: DOES ANYONE TRY TO KEEP YOU FROM HAVING/CONTACTING OTHER FRIENDS OR DOING THINGS OUTSIDE YOUR HOME?: NO

## 2025-05-21 SDOH — SOCIAL STABILITY: SOCIAL INSECURITY: DO YOU FEEL ANYONE HAS EXPLOITED OR TAKEN ADVANTAGE OF YOU FINANCIALLY OR OF YOUR PERSONAL PROPERTY?: NO

## 2025-05-21 ASSESSMENT — COGNITIVE AND FUNCTIONAL STATUS - GENERAL
MOBILITY SCORE: 24
DAILY ACTIVITIY SCORE: 24
PATIENT BASELINE BEDBOUND: NO

## 2025-05-21 ASSESSMENT — ACTIVITIES OF DAILY LIVING (ADL)
ADEQUATE_TO_COMPLETE_ADL: YES
HEARING - LEFT EAR: FUNCTIONAL
GROOMING: INDEPENDENT
FEEDING YOURSELF: INDEPENDENT
JUDGMENT_ADEQUATE_SAFELY_COMPLETE_DAILY_ACTIVITIES: YES
DRESSING YOURSELF: INDEPENDENT
WALKS IN HOME: INDEPENDENT
PATIENT'S MEMORY ADEQUATE TO SAFELY COMPLETE DAILY ACTIVITIES?: YES
TOILETING: INDEPENDENT
HEARING - RIGHT EAR: FUNCTIONAL
BATHING: INDEPENDENT

## 2025-05-21 ASSESSMENT — LIFESTYLE VARIABLES
AUDIT-C TOTAL SCORE: 0
HOW MANY STANDARD DRINKS CONTAINING ALCOHOL DO YOU HAVE ON A TYPICAL DAY: PATIENT DOES NOT DRINK
AUDIT-C TOTAL SCORE: 0
SKIP TO QUESTIONS 9-10: 1
HOW OFTEN DO YOU HAVE A DRINK CONTAINING ALCOHOL: NEVER
HOW OFTEN DO YOU HAVE 6 OR MORE DRINKS ON ONE OCCASION: NEVER

## 2025-05-21 ASSESSMENT — PAIN SCALES - GENERAL: PAINLEVEL_OUTOF10: 0 - NO PAIN

## 2025-05-21 ASSESSMENT — PATIENT HEALTH QUESTIONNAIRE - PHQ9
2. FEELING DOWN, DEPRESSED OR HOPELESS: NOT AT ALL
SUM OF ALL RESPONSES TO PHQ9 QUESTIONS 1 & 2: 0
1. LITTLE INTEREST OR PLEASURE IN DOING THINGS: NOT AT ALL

## 2025-05-21 ASSESSMENT — COLUMBIA-SUICIDE SEVERITY RATING SCALE - C-SSRS
1. IN THE PAST MONTH, HAVE YOU WISHED YOU WERE DEAD OR WISHED YOU COULD GO TO SLEEP AND NOT WAKE UP?: NO
6. HAVE YOU EVER DONE ANYTHING, STARTED TO DO ANYTHING, OR PREPARED TO DO ANYTHING TO END YOUR LIFE?: NO
2. HAVE YOU ACTUALLY HAD ANY THOUGHTS OF KILLING YOURSELF?: NO

## 2025-05-21 ASSESSMENT — PAIN - FUNCTIONAL ASSESSMENT
PAIN_FUNCTIONAL_ASSESSMENT: UNABLE TO SELF-REPORT
PAIN_FUNCTIONAL_ASSESSMENT: 0-10

## 2025-05-21 NOTE — PROGRESS NOTES
Danika Demarco is a 89 y.o. female admitted for No Principal Problem: There is no principal problem currently on the Problem List. Please update the Problem List and refresh.. Pharmacy reviewed the patient's [id number] medications and allergies for accuracy.    The list below reflects the PTA list prior to pharmacy medication history. A summary a changes to the PTA medication list has been listed below. Please review each medication in order reconciliation for additional clarification and justification.    Source of information:  Surescripts and T2P family    Medications added:    Medications modified:    Medications to be removed:  Meclizine 25mg  Macrobid 100mg   Medications of concern:      Prior to Admission Medications   Prescriptions Last Dose Informant Patient Reported? Taking?   aspirin 325 mg tablet   Yes No   Sig: Take 1 tablet (325 mg) by mouth once daily.   lidocaine (Lidoderm) 5 % patch   No No   Sig: Place 1 patch over 12 hours on the skin once daily. Apply to painful area 12 hours per day, remove for 12 hours.   lisinopril 40 mg tablet   No No   Sig: Take 1 tablet by mouth once daily   meclizine (Antivert) 25 mg tablet   Yes No   Sig: Take by mouth.   metoprolol succinate XL (Toprol-XL) 25 mg 24 hr tablet   No No   Sig: TAKE 1 TABLET BY MOUTH ONCE DAILY WITH  50MG  TAB  TO  TOTAL  75MG   metoprolol succinate XL (Toprol-XL) 50 mg 24 hr tablet   No No   Sig: TAKE 1 TABLET BY MOUTH ONCE DAILY WITH  25MG  TAB  TO  TOTAL  75MG   nitrofurantoin, macrocrystal-monohydrate, (Macrobid) 100 mg capsule   No No   Sig: Take 1 pill daily for 3 months   vibegron (Gemtesa) 75 mg tablet   No No   Sig: Take 1 tablet (75 mg) by mouth once daily.      Facility-Administered Medications: None       Emilie Tidwell

## 2025-05-21 NOTE — H&P
[facility] - GENERAL MEDICINE HISTORY AND PHYSICAL    HISTORY OF PRESENT ILLNESS     Collateral History (Secondary Sources): D/w ED    History Of Present Illness (HPI):  Danika Demarco is a 89 y.o. female with PMHx s/f A-fib (not on OAC 2/2 history of traumatic subdural hematoma after a fall), HTN, aortic stenosis, ulcerative colitis, HLD, T2DM presenting with global aphasia and right facial droop. LKW around 1445. Per family, her grandson notes that she is slurring speech and speaking gibberish. He talked to his grandpa/patient's  who called EMS. On EMS arrival, she became muted with right sided weakness. Later, she was found to be completely plegic on right side in ED with left gaze deviation and global aphasia. Family reports she also had a fall about a week ago, they went to urgent care due to her right rib pain, no acute rib fractures and is on lidocaine patches.    ED Course:   Vitals on presentation: T 36.7 °C (98.1 °F)  HR (!) 119  BP (!) 173/101  RR 16  O2 94 % None (Room air)  Labs:   CBC with WBC 7.3, Hgb 14.4, Plts 159.   CMP with glucose 105, Na 138, K 4.6, BUN 19, sCr 0.60, alk phos 63, ALT 12, AST 27, bilirubin 0.8. .   Trop 10.   EKG: A-fib at 100 bpm  Imaging - agree with radiology interpretation(s):   CT head-stable old anterolateral right frontal parietal craniotomy defect.  No acute intracranial bleed or focal mass effect.  Mild volume loss.  Mild chronic white matter ischemic disease.  CTA head and neck-tapering of distal left M1 and occlusion of dominant anterior branch of left M2.  Small posterior left M2 branches appear patent.  Interventions: Received TNK around 1600, admission to ICU for further management    12-point ROS reviewed and found to be negative aside from aforementioned positives in HPI and/or noted in dedicated ROS section below.     Decision made to admit the patient to the hospitalist service after evaluation of the patient, review of the above, and  discussion with ED provider.     LABS AND IMAGING     I have personally reviewed the following labs from 05/21/25: CBC, CMP, and Troponin  I have personally reviewed the following imaging studies from 05/21/25: CT Head without Contrast  and CTA Head and Neck, with my personal interpretations as documented in ED course above.   I have personally reviewed any obtained EKGs on 05/21/25, with my interpretation as listed above in the ED summary course.   I have personally reviewed the patient's vitals on presentation to the ED and any/all changes through to time of admission (on 05/21/25).     ED Course (From ED Provider):  Diagnoses as of 05/21/25 1836   Cerebrovascular accident (CVA), unspecified mechanism (Multi)   Cerebral infarction due to unspecified occlusion or stenosis of left middle cerebral artery (Multi)     Relevant Results  Results for orders placed or performed during the hospital encounter of 05/21/25 (from the past 24 hours)   POCT GLUCOSE   Result Value Ref Range    POCT Glucose 109 (H) 74 - 99 mg/dL   Comprehensive metabolic panel   Result Value Ref Range    Glucose 105 (H) 74 - 99 mg/dL    Sodium 138 136 - 145 mmol/L    Potassium 4.6 3.5 - 5.3 mmol/L    Chloride 101 98 - 107 mmol/L    Bicarbonate 25 21 - 32 mmol/L    Anion Gap 17 10 - 20 mmol/L    Urea Nitrogen 19 6 - 23 mg/dL    Creatinine 0.60 0.50 - 1.05 mg/dL    eGFR 83 >60 mL/min/1.73m*2    Calcium 8.7 8.6 - 10.3 mg/dL    Albumin 3.6 3.4 - 5.0 g/dL    Alkaline Phosphatase 63 33 - 136 U/L    Total Protein 6.9 6.4 - 8.2 g/dL    AST 27 9 - 39 U/L    Bilirubin, Total 0.8 0.0 - 1.2 mg/dL    ALT 12 7 - 45 U/L   Troponin I, High Sensitivity   Result Value Ref Range    Troponin I, High Sensitivity 10 0 - 13 ng/L   CBC and Auto Differential   Result Value Ref Range    WBC 7.3 4.4 - 11.3 x10*3/uL    nRBC 0.0 0.0 - 0.0 /100 WBCs    RBC 4.53 4.00 - 5.20 x10*6/uL    Hemoglobin 14.4 12.0 - 16.0 g/dL    Hematocrit 43.8 36.0 - 46.0 %    MCV 97 80 - 100 fL    MCH  31.8 26.0 - 34.0 pg    MCHC 32.9 32.0 - 36.0 g/dL    RDW 13.5 11.5 - 14.5 %    Platelets 159 150 - 450 x10*3/uL    Neutrophils % 71.6 40.0 - 80.0 %    Immature Granulocytes %, Automated 0.3 0.0 - 0.9 %    Lymphocytes % 17.4 13.0 - 44.0 %    Monocytes % 8.3 2.0 - 10.0 %    Eosinophils % 1.6 0.0 - 6.0 %    Basophils % 0.8 0.0 - 2.0 %    Neutrophils Absolute 5.23 1.60 - 5.50 x10*3/uL    Immature Granulocytes Absolute, Automated 0.02 0.00 - 0.50 x10*3/uL    Lymphocytes Absolute 1.27 0.80 - 3.00 x10*3/uL    Monocytes Absolute 0.61 0.05 - 0.80 x10*3/uL    Eosinophils Absolute 0.12 0.00 - 0.40 x10*3/uL    Basophils Absolute 0.06 0.00 - 0.10 x10*3/uL   Protime-INR   Result Value Ref Range    Protime 12.7 (H) 9.8 - 12.4 seconds    INR 1.1 0.9 - 1.1   APTT   Result Value Ref Range    aPTT 23 (L) 26 - 36 seconds      Imaging  XR chest 1 view  Result Date: 5/21/2025  1.  Cardiomegaly with mild interstitial prominence diffusely and mild blunting of the right costophrenic angles. Mild interstitial edema/CHF not excluded. Short-term follow-up PA and lateral as clinically warranted.       MACRO: None   Signed by: Jennifer Carvajal 5/21/2025 4:46 PM Dictation workstation:   [id number]    CT brain attack angio head and neck W and WO IV contrast  Result Date: 5/21/2025  Tapering of the distal left M1 and occlusion of the dominant anterior branch of left M2 from the origin. The smaller posterior left M2 branch appears patent. The remainder of the large neck and intracranial vessels are widely patent without focal stenosis.   Partially visualized small pleural effusions.   Bautista Guerrero discussed the significance and urgency of this critical finding by epic with  DEANDRE ROCHA on 5/21/2025 at 4:11 pm. (**-RCF-**) Findings:  See findings.     MACRO: None   Signed by: Bautista Guerrero 5/21/2025 4:12 PM Dictation workstation:   [id number]    CT brain attack head wo IV contrast  Result Date: 5/21/2025  Stable old anterolateral right frontal  parietal craniotomy defect.   No acute intracranial bleed or focal mass effect.   Mild volume loss.   Mild chronic white matter ischemic disease in the deep periventricular regions.   Very mild paranasal sinusitis as described.   MACRO: Sonny Leonela discussed the significance and urgency of this critical finding by epic secure chat with  DEANDRE ROCHA on 5/21/2025 at 3:58 pm.  (**-RCF-**) Findings:  See findings.   Signed by: Sonny Gipson 5/21/2025 3:58 PM Dictation workstation:   [id number]      Cardiology, Vascular, and Other Imaging  No other imaging results found for the past 2 days       PAST HISTORIES AND ALLERGIES     Past Medical History  She has a past medical history of Abnormal levels of other serum enzymes, Personal history of other (healed) physical injury and trauma, Personal history of other diseases of urinary system, Personal history of urinary (tract) infections, and Urinary bladder incontinence (09/25/2023).    Surgical History  She has a past surgical history that includes Other surgical history (01/21/2020); Other surgical history (01/21/2020); Other surgical history (01/21/2020); Other surgical history (01/21/2020); Other surgical history (01/21/2020); Other surgical history (01/21/2020); and Other surgical history (01/21/2020).     Social History  She reports that she has never smoked. She has never been exposed to tobacco smoke. She has never used smokeless tobacco. She reports current alcohol use. She reports that she does not use drugs.    Family History  Family History[1]    Allergies  Patient has no known allergies.    MEDICATIONS     Scheduled Medications:  Scheduled Medications[2]  Continuous Medications:  Continuous Medications[3]  PRN Medications:  PRN Medications[4]     REVIEW OF SYSTEMS     Review of Systems   Unable to perform ROS: Acuity of condition       OBJECTIVE     Last Recorded Vitals  BP (!) 159/110 Comment: Simultaneous filing. User may not have seen previous data.   Pulse 97   Temp 36.7 °C (98.1 °F)   Resp (!) 23   Wt 69 kg (152 lb 1.9 oz)   SpO2 100%      Physical Exam:  Vital signs and nursing notes reviewed.   Constitutional: Pleasant and cooperative. Laying in bed in no acute distress.    Skin: Warm and dry; no obvious lesions, rashes, pallor, or jaundice.   Eyes: EOMI. Anicteric sclera.   ENT: Mucous membranes moist  Head and Neck: Normocephalic, atraumatic   Respiratory: Nonlabored on RA. Lungs clear to auscultation bilaterally without obvious adventitious sounds. Chest rise is equal.  Cardiovascular: Irregular rhythm. Murmur present. Extremities are warm and well-perfused with good capillary refill (< 3 seconds). No chest wall tenderness.   GI: Abdomen soft, nontender, nondistended. No obvious organomegaly appreciated. Bowel sounds are present.  : No CVA tenderness.   MSK: No gross abnormalities appreciated. No limitations to AROM/PROM appreciated.   Extremities: No cyanosis, edema, or clubbing evident. Neurovascularly intact.   Neuro: See below for details  Psych: Appropriate mood and behavior.    1a  Level of consciousness: 0=alert; keenly responsive   1b. LOC questions:  0=Performs both tasks correctly   1c. LOC commands: 0=Performs both tasks correctly   2.  Best Gaze: 1=partial gaze palsy   3. Visual: 0=No visual loss   4. Facial Palsy: 1=Minor paralysis (flattened nasolabial fold, asymmetric on smiling)   5a. Motor left arm: 0=No drift, limb holds 90 (or 45) degrees for full 10 seconds   5b.  Motor right arm: 1=Drift, limb holds 90 (or 45) degrees but drifts down before full 10 seconds: does not hit bed   6a. motor left le=No drift, limb holds 90 (or 45) degrees for full 10 seconds   6b  Motor right le=No drift, limb holds 90 (or 45) degrees for full 10 seconds   7. Limb Ataxia: 1=Present in one limb   8.  Sensory: 0=Normal; no sensory loss   9. Best Language:  2=Severe Aphasia: fragmentary expression, inference needed, cannot identify materials    10. Dysarthria: 1=Mild to moderate, patient slurs at least some words and at worst, can be understood with some difficulty   11. Extinction and Inattention: 0=No abnormality     Total:   7     ASSESSMENT AND PLAN   Assessment/Plan     89 y.o. female with PMHx s/f A-fib (not on OAC 2/2 history of traumatic subdural hematoma after a fall), HTN, aortic stenosis, ulcerative colitis, HLD, T2DM presenting with global aphasia and right facial droop.    Acute CVA with L M2 occlusion and tapering of distal L M1 s/p TNK  -CTA head and neck-tapering of distal left M1 and occlusion of dominant anterior branch of left M2  -MRI Brain w/o contrast (24 hours s/p TNK)   -Echocardiogram with bubble study  -Telemetry   -Lipid panel on 1/31/25: unremarkable and LDL within goal at 47  -Holding antithrombotic agents, anticoagulants, or antiplatelet drugs for at least 24 hrs per TNK protocol   -Early permissive hypertension; post thrombolysis with BP maintained at or below 180/105 mmHg  -aspiration and fall precautions  -PT/OT/SLP   -Neurology consultation, appreciate further recommendations    Atrial fibrillation  - Not on oral anticoagulations due to history of subdural hematoma  - Hold metoprolol succinate for permissive hypertension    Abnormal CXR  -CXR with cardiomegaly with mild interstitial prominence diffusely  -BNP pending  -pt appears euvolemic on exam    HTN  - Hold antihypertensives as outlined above    T2DM  -A1c 6.0% on 1/31/2025  -Currently not on med therapy, BS well-controlled    Diet: NPO (Sips with meds) until swallow eval  DVT Prophylaxis: SCDs, Chemoprophylaxis deferred -- see above   Code Status: DNR   Case Discussed With: ED provider  Additional Sources Reviewed: ED note day of admission; Primary Care / PCP / Family Medicine and Cardiology    Anticipated Length of Stay (LOS): Patient will require two-plus midnight stay for further evaluation and management of the above.      LAUREEN Cam dictation  software was used to dictate this note and thus there may be minor errors in translation/transcription including garbled speech or misspellings. Please contact for clarification if needed.       [1]   Family History  Problem Relation Name Age of Onset    Breast cancer Sister      Diabetes Brother      Prostate cancer Brother     [2] atorvastatin, 40 mg, oral, Nightly  labetaloL, 10 mg, intravenous, Once  [Held by provider] lisinopril, 40 mg, oral, Daily  [Held by provider] metoprolol succinate XL, 25 mg, oral, Daily  [Held by provider] metoprolol succinate XL, 50 mg, oral, Daily  [START ON 5/22/2025] pantoprazole, 40 mg, oral, Daily before breakfast   Or  [START ON 5/22/2025] pantoprazole, 40 mg, intravenous, Daily before breakfast    [3]    [4] PRN medications: acetaminophen, hydrALAZINE **FOLLOWED BY** [START ON 5/23/2025] hydrALAZINE, labetaloL, ondansetron

## 2025-05-21 NOTE — ED PROVIDER NOTES
HPI   No chief complaint on file.      History/Exam limitations: acuity of illness.   Additional history was obtained from EMS personnel.    Chief comoplaint: Stroke symptoms    History of present illness: Patient is an 84-year-old female with history of A-fib currently on no anticoagulation therapy presented to the emergency department with complaints of difficulty with speech and facial droop.  According to EMS who provides patient's history, the patient was last well-known at 1445 today approximate 45 minutes prior to arrival in the emergency department.  At that time, the patient again experienced difficulty with her speech.  EMS was called and they arrived and brought the patient to the emergency department they state that nothing was given around however, the patient had worsening difficulty with speech.  Initially, patient was slurring her speech about the time she arrived in the emergency department she was mute.  They state the patient was able to understand things which deteriorated and arrived to the emergency department.  Patient is unable provide any further history given her mentation on arrival.  There is no indication the patient can understand the instructions when interacting with her.      Last Known Well Time: 14:45        Interval: Baseline  Time: 3:50 PM  Person Administering Scale: Jose Eduardo Lincoln MD     1a  Level of consciousness: 0=alert; keenly responsive  1b. LOC questions:  2=Performs neither task correctly  1c. LOC commands: 2=Performs neither task correctly  2.  Best Gaze: 1=partial gaze palsy  3. Visual: 2=Complete hemianopia  4. Facial Palsy: 2=Partial paralysis (total or near total paralysis of the lower face)  5a. Motor left arm: 0=No drift, limb holds 90 (or 45) degrees for full 10 seconds  5b.  Motor right arm: 1=Drift, limb holds 90 (or 45) degrees but drifts down before full 10 seconds: does not hit bed  6a. motor left le=No drift, limb holds 90 (or 45) degrees for full 10  seconds  6b  Motor right le=Some effort against gravity, limb cannot get to or maintain (if cured) 90 (or 45) degrees, drifts down to bed, but has some effort against gravity  7. Limb Ataxia: 0=Absent  8.  Sensory: 1=Mild to moderate sensory loss; patient feels pinprick is less sharp or is dull on the affected side; there is a loss of superficial pain with pinprick but patient is aware She is being touched  9. Best Language:  3=Mute, global aphasia; no usable speech or auditory comprehension  10. Dysarthria: 2=Severe; patient speech is so slurred as to be unintelligible in the absence of or our of proportion to any dysphagia, or is mute/anarthric  11. Extinction and Inattention: 0=No abnormality    Total:   17        VAN: VAN: Positive     -----------------------------------------------------------------------------------------------------------------------------------------      History provided by:  EMS personnel  History limited by:  Acuity of condition and patient nonverbal   used: No            Patient History   Medical History[1]  Surgical History[2]  Family History[3]  Social History[4]    Physical Exam   ED Triage Vitals   Temp Pulse Resp BP   -- -- -- --      SpO2 Temp src Heart Rate Source Patient Position   -- -- -- --      BP Location FiO2 (%)     -- --       Physical Exam  Constitutional:       Appearance: Normal appearance.   HENT:      Head: Normocephalic and atraumatic.      Right Ear: External ear normal.      Left Ear: External ear normal.      Nose: Nose normal.      Mouth/Throat:      Mouth: Mucous membranes are moist.   Eyes:      General: Lids are normal.      Extraocular Movements: Extraocular movements intact.      Pupils: Pupils are equal, round, and reactive to light.   Cardiovascular:      Rate and Rhythm: Normal rate and regular rhythm.      Heart sounds: Normal heart sounds.   Pulmonary:      Effort: Pulmonary effort is normal.      Breath sounds: Normal breath  sounds.   Abdominal:      General: Abdomen is flat.      Palpations: Abdomen is soft.      Tenderness: There is no abdominal tenderness. There is no guarding or rebound.   Musculoskeletal:         General: No deformity. Normal range of motion.      Cervical back: Normal range of motion and neck supple.   Skin:     General: Skin is warm.      Capillary Refill: Capillary refill takes less than 2 seconds.      Coloration: Skin is not jaundiced.   Neurological:      General: No focal deficit present.      Mental Status: She is alert.      Cranial Nerves: Facial asymmetry present.   Psychiatric:         Mood and Affect: Mood normal.         Behavior: Behavior normal.           ED Course & MDM   Diagnoses as of 05/25/25 1230   Cerebrovascular accident (CVA), unspecified mechanism (Multi)   Cerebral infarction due to unspecified occlusion or stenosis of left middle cerebral artery (Multi)                 No data recorded                                 Medical Decision Making  On arrival to the emergency department, it was apparent that the patient was having an ischemic stroke.  CBC demonstrated no significant abnormalities Chem-7 and LFTs were all within normal limits BNP was 171 PTT was 23 INR is 1.1 troponin is 10.  CT of the patient's head without IV contrast demonstrated no intracranial hemorrhage as read by me.  Patient's EKG demonstrates A-fib with a rate of 100 bpm isoelectric ST segments narrow QRS complexes and a QTc of 443.    At this time, I felt the patient would benefit from tenecteplase therapy.  I spoke with the patient's significant other on the phone I explained the risks of tenecteplase therapy to the patient  without hesitation he informed to give the medication.  As result I ordered tenecteplase to be given to the patient.  The patient was evaluated by stroke neuro.    I then reviewed the patient's chart.  To my surprise, the patient does have a history of remote subdural hematoma requiring  craniotomy.  I informed neurology who expressed understanding.  CT angiography of the patient's head demonstrated an M2 occlusion.  The patient's case was reviewed by both stroke neurology as well as interventional neurology.  After evaluation they feel the patient would not benefit from clot retrieval and as result requires the patient be admitted to the ICU at [facility] given the fact she received tenecteplase.    Practically during her period of time in the emergency department, the patient had significant improvement of her symptoms the patient was attempting to speak and waved at me using her once paralyzed hand on her way to the ICU as she left the emergency department.    Amount and/or Complexity of Data Reviewed  Labs: ordered. Decision-making details documented in ED Course.  Radiology: ordered. Decision-making details documented in ED Course.  ECG/medicine tests: ordered and independent interpretation performed. Decision-making details documented in ED Course.        Procedure  Critical Care    Performed by: Jose Eduardo Lincoln MD  Authorized by: Jose Eduardo Lincoln MD    Critical care provider statement:     Critical care time (minutes):  65    Critical care time was exclusive of:  Separately billable procedures and treating other patients    Critical care was necessary to treat or prevent imminent or life-threatening deterioration of the following conditions:  CNS failure or compromise    Critical care was time spent personally by me on the following activities:  Blood draw for specimens, discussions with consultants, evaluation of patient's response to treatment, examination of patient, ordering and performing treatments and interventions, ordering and review of laboratory studies, ordering and review of radiographic studies, pulse oximetry and re-evaluation of patient's condition    Care discussed with: admitting provider             [1]   Past Medical History:  Diagnosis  Date    Abnormal levels of other serum enzymes     Elevated liver enzymes    Personal history of other (healed) physical injury and trauma     History of traumatic subdural hematoma    Personal history of other diseases of urinary system     History of hematuria    Personal history of urinary (tract) infections     History of urinary tract infection    Urinary bladder incontinence 09/25/2023   [2]   Past Surgical History:  Procedure Laterality Date    OTHER SURGICAL HISTORY  01/21/2020    Bladder surgery    OTHER SURGICAL HISTORY  01/21/2020    Hysterectomy    OTHER SURGICAL HISTORY  01/21/2020    Craniotomy    OTHER SURGICAL HISTORY  01/21/2020    Varicose vein ligation    OTHER SURGICAL HISTORY  01/21/2020    Cholecystectomy    OTHER SURGICAL HISTORY  01/21/2020    Hernia repair    OTHER SURGICAL HISTORY  01/21/2020    Oophorectomy bilateral   [3]   Family History  Problem Relation Name Age of Onset    Breast cancer Sister      Diabetes Brother      Prostate cancer Brother     [4]   Social History  Tobacco Use    Smoking status: Never     Passive exposure: Never    Smokeless tobacco: Never   Vaping Use    Vaping status: Never Used   Substance Use Topics    Alcohol use: Yes     Comment: occasionally    Drug use: Never        Jose Eduardo Lincoln MD  05/25/25 1738

## 2025-05-21 NOTE — CONSULTS
"Inpatient consult to Neuro TeleStroke  Consult performed by: All Mcgee MD  Consult ordered by: Jose Eduardo Lincoln MD        Virtual Visit start time: 16:05    History Of Present Illness:  Historian: Patient  Danika Demarco is a 89 y.o. female with A.fib (Not on AC), DM2, Ulcerative collitis, Remote traumatic SDH presenting with right hemiplegia.    Patient was normal when she suddenly started slurring her speech at 14:45, this was witnessed by the  who called EMS, by the time EMS arrive she because mute with right sided weakness, and in the ED was found to be completely plegic on the right with left gaze deviation and global aphasia    Last known well: 14:45  Had stroke symptoms resolved at time of presentation: No   Current antiplatelet/anticoagulant use: Aspirin    Prior Functional Status (Modified Mangum Scale):  0 The patient has no residual symptoms.    Stroke Risk Factors:  Hypertension, Diabetes Mellitus, and Atrial Fibrillation    Last Recorded Vitals:  Blood pressure (!) 156/91, pulse (!) 101, temperature 36.7 °C (98.1 °F), resp. rate (!) 28, height 1.6 m (5' 3\"), weight 69 kg (152 lb 1.9 oz), SpO2 96%.     NIHSS:   NIH Stroke Scale:     1A. Level of Consciousness:  Arouses to minor stimulation (+1)    1B. Ask Month and Age:  0 questions right (+2)    1C. Blink Eyes & Squeeze Hands:  Performs 0 tasks (+2)    2. Best Gaze:  Forced gaze palsy (+2)    3. Visual:  Complete hemianopia (+2)    4. Facial Palsy:  Minor paralysis (+1)    5A. Motor - Left Arm:  No drift (0)    5B. Motor - Right Arm:  No movement (+4)    6A. Motor - Left Leg:  No drift (0)    6B. Motor - Right Leg:  No movement (+4)    7. Limb Ataxia:  No ataxia (0)    8. Sensory Loss:  Mild-moderate loss (+1)    9. Best Language:  Mute/global aphasia (+3)    10. Dysarthia:  Severe dysarthria (+2)    11. Extinction and Inattention:  No abnormality (0)    NIH Stroke Scale:  24       Relevant Results:  LABS:  No results found for: \"GLUCOSE\", " "\"INR\"   Results for orders placed or performed during the hospital encounter of 05/21/25 (from the past 24 hours)   POCT GLUCOSE   Result Value Ref Range    POCT Glucose 109 (H) 74 - 99 mg/dL        CT Head Imaging:  CTH imaging personally reviewed, showed no acute ischemic / hemorrhagic changes     CTA Head and Neck Imaging:  CTA head and neck imaging personally reviewed, positive for large vessel occlusion or severe stenosis - L M2 occlusion      Diagnosis:  Supect Acute Ischemic Stroke    Assessment/Plan:  89y old female presenting with a full L MCA syndrome, CTA showed L M2 occlusion, and CTH shows no early ischemic changes. Patient given TNK but no thrombectomy as interventional team felt risks outweigh benefits given her age and distal occlusion.     IV Thrombolysis IV Thrombolysis Checklist        IV Thrombolysis Given: Yes Thrombolysis Administration: administration time 16:02 Patient meets inclusion and exclusion criteria with expected benefits exceeding the risks of IV thrombolysis therapy or withholding therapy. Patient/ family understand potential risks & benefits and consent to IV Thrombolysis. Discussed the diagnosis of suspected ischemic stroke and options for treatment, including alternative treatments. For patients meeting inclusion and exclusion criteria, the expected benefits exceed the risks of IV thrombolysis therapy or withholding therapy. The main risk of IV thrombolysis therapy is bleeding into the brain or body that may require blood transfusions or lead to death. In clinical studies, the rate of death was not higher in patients who received IV thrombolysis compared to those who did not. Other risks include allergic reactions, and with any procedure there is always the possibility of an unexpected complication.  Patient is <18 - refer [facility] patients with Acute suspected Stroke & the Pediatric IV Thrombolysis Consent. Consent is completed on a paper " document and if criteria is met/ benefit outweigh the risk the thrombolytic Alteplase should be given.  :99}Were there delays to thrombolysis administration?: no          Patient is a candidate for thrombectomy:  yes/no: No; contraindication reason: No evidence of proximal occlusion    Additional Recommendations:  MRI Brain w/o contrast stroke protocol or repeat CTH 24 hours after initial CTH if unable to get MRI.  EKG, troponin.  TTE w/ bubble study.  Lipid panel, A1c.  Hold off all antiplatelets and anticoagulant medications for 24 hrs after thrombolysis. Repeat CT Head 24 hrs after TNK administration. If CTH negative for hemorrhagic conversion, consider starting Aspirin 81 mg.  ASA 81mg daily to be started 24h after TNK  Discuss initiation of anticoagulation for A.fib after MRI obtained to evaluate stroke burden and risk of hemorrhagic transformation  Lipid Goals: education on healthy diet and statin therapy to maintain or achieve goal LDL-cholesterol < 70mg. Atorvastatin 40mg..  Blood pressure goals: avoid hypotension SBP <100 that could worsen cerebral perfusion. Ischemic stroke post-thrombolysis- BP < 180/105 mmHg for 24hr..  Glucose Goals: early treatment of hyperglycemia to goal glucose 140-180 mg/dl with long-term goal A1c < 7%.  NPO until nurse bedside swallow assessment.  Consider focused stroke order set.  Smoking Cessation and Education.  Assessment for Rehabilitation needs.  Patient and family education on signs and symptoms of stroke, calling 911, healthy strategies for stroke prevention.      Disposition:  Patient will remain at referring facility for further evaluation and management.    Virtual or Telephone Consent    An interactive audio and video telecommunication system which permits real time communications between the patient (at the originating site) and provider (at the distant site) was utilized to provide this telehealth service.   The patient was unable to consent and there was no next of  kin available to provide consent. Due to the emergent nature of the patient's medical problem the provider team deemed it medically necessary to proceed with the telehealth visit.     Telestroke is covered in shift work. If there are further Neurological questions or concerns please contact your regional neurologist on call during daytime hours or contact the transfer center with an ADT20 order.    All Mcgee MD  Vascular Neurology Fellow, PGY5  [facility] [address]

## 2025-05-22 ENCOUNTER — APPOINTMENT (OUTPATIENT)
Dept: CARDIOLOGY | Facility: HOSPITAL | Age: OVER 89
DRG: 062 | End: 2025-05-22
Payer: MEDICARE

## 2025-05-22 ENCOUNTER — APPOINTMENT (OUTPATIENT)
Dept: RADIOLOGY | Facility: HOSPITAL | Age: OVER 89
DRG: 062 | End: 2025-05-22
Payer: MEDICARE

## 2025-05-22 LAB
ALBUMIN SERPL BCP-MCNC: 3.3 G/DL (ref 3.4–5)
ALP SERPL-CCNC: 56 U/L (ref 33–136)
ALT SERPL W P-5'-P-CCNC: 13 U/L (ref 7–45)
ANION GAP SERPL CALC-SCNC: 13 MMOL/L (ref 10–20)
AORTIC VALVE MEAN GRADIENT: 5 MMHG
AORTIC VALVE PEAK VELOCITY: 1.51 M/S
APPEARANCE UR: CLEAR
AST SERPL W P-5'-P-CCNC: 21 U/L (ref 9–39)
AV PEAK GRADIENT: 9 MMHG
AVA (PEAK VEL): 1.89 CM2
AVA (VTI): 1.8 CM2
BACTERIA #/AREA URNS AUTO: ABNORMAL /HPF
BASOPHILS # BLD AUTO: 0.05 X10*3/UL (ref 0–0.1)
BASOPHILS NFR BLD AUTO: 0.6 %
BILIRUB SERPL-MCNC: 1.8 MG/DL (ref 0–1.2)
BILIRUB UR STRIP.AUTO-MCNC: NEGATIVE MG/DL
BUN SERPL-MCNC: 16 MG/DL (ref 6–23)
CALCIUM SERPL-MCNC: 8.7 MG/DL (ref 8.6–10.3)
CHLORIDE SERPL-SCNC: 104 MMOL/L (ref 98–107)
CHOLEST SERPL-MCNC: 79 MG/DL (ref 0–199)
CHOLESTEROL/HDL RATIO: 2.3
CO2 SERPL-SCNC: 26 MMOL/L (ref 21–32)
COLOR UR: ABNORMAL
CREAT SERPL-MCNC: 0.62 MG/DL (ref 0.5–1.05)
EGFRCR SERPLBLD CKD-EPI 2021: 83 ML/MIN/1.73M*2
EJECTION FRACTION APICAL 4 CHAMBER: 63.3
EJECTION FRACTION: 63 %
EOSINOPHIL # BLD AUTO: 0.09 X10*3/UL (ref 0–0.4)
EOSINOPHIL NFR BLD AUTO: 1 %
ERYTHROCYTE [DISTWIDTH] IN BLOOD BY AUTOMATED COUNT: 13.5 % (ref 11.5–14.5)
EST. AVERAGE GLUCOSE BLD GHB EST-MCNC: 111 MG/DL
GLUCOSE BLD MANUAL STRIP-MCNC: 109 MG/DL (ref 74–99)
GLUCOSE BLD MANUAL STRIP-MCNC: 145 MG/DL (ref 74–99)
GLUCOSE BLD MANUAL STRIP-MCNC: 154 MG/DL (ref 74–99)
GLUCOSE BLD MANUAL STRIP-MCNC: 165 MG/DL (ref 74–99)
GLUCOSE SERPL-MCNC: 105 MG/DL (ref 74–99)
GLUCOSE UR STRIP.AUTO-MCNC: NORMAL MG/DL
HBA1C MFR BLD: 5.5 % (ref ?–5.7)
HCT VFR BLD AUTO: 39.6 % (ref 36–46)
HDLC SERPL-MCNC: 33.8 MG/DL
HGB BLD-MCNC: 13.2 G/DL (ref 12–16)
HOLD SPECIMEN: NORMAL
IMM GRANULOCYTES # BLD AUTO: 0.04 X10*3/UL (ref 0–0.5)
IMM GRANULOCYTES NFR BLD AUTO: 0.4 % (ref 0–0.9)
KETONES UR STRIP.AUTO-MCNC: NEGATIVE MG/DL
LDLC SERPL CALC-MCNC: 24 MG/DL
LEFT ATRIUM VOLUME AREA LENGTH INDEX BSA: 35.7 ML/M2
LEFT VENTRICLE INTERNAL DIMENSION DIASTOLE: 4.4 CM (ref 3.5–6)
LEFT VENTRICULAR OUTFLOW TRACT DIAMETER: 2 CM
LEUKOCYTE ESTERASE UR QL STRIP.AUTO: ABNORMAL
LV EJECTION FRACTION BIPLANE: 63 %
LYMPHOCYTES # BLD AUTO: 1.91 X10*3/UL (ref 0.8–3)
LYMPHOCYTES NFR BLD AUTO: 21.1 %
MAGNESIUM SERPL-MCNC: 1.65 MG/DL (ref 1.6–2.4)
MCH RBC QN AUTO: 31.7 PG (ref 26–34)
MCHC RBC AUTO-ENTMCNC: 33.3 G/DL (ref 32–36)
MCV RBC AUTO: 95 FL (ref 80–100)
MITRAL VALVE E/A RATIO: 3.48
MONOCYTES # BLD AUTO: 0.84 X10*3/UL (ref 0.05–0.8)
MONOCYTES NFR BLD AUTO: 9.3 %
NEUTROPHILS # BLD AUTO: 6.14 X10*3/UL (ref 1.6–5.5)
NEUTROPHILS NFR BLD AUTO: 67.6 %
NITRITE UR QL STRIP.AUTO: ABNORMAL
NON HDL CHOLESTEROL: 45 MG/DL (ref 0–149)
NRBC BLD-RTO: 0 /100 WBCS (ref 0–0)
PH UR STRIP.AUTO: 6 [PH]
PHOSPHATE SERPL-MCNC: 3.6 MG/DL (ref 2.5–4.9)
PLATELET # BLD AUTO: 209 X10*3/UL (ref 150–450)
POTASSIUM SERPL-SCNC: 3.8 MMOL/L (ref 3.5–5.3)
PROT SERPL-MCNC: 6.6 G/DL (ref 6.4–8.2)
PROT UR STRIP.AUTO-MCNC: NEGATIVE MG/DL
RBC # BLD AUTO: 4.17 X10*6/UL (ref 4–5.2)
RBC # UR STRIP.AUTO: NEGATIVE MG/DL
RBC #/AREA URNS AUTO: ABNORMAL /HPF
RIGHT VENTRICLE FREE WALL PEAK S': 11.3 CM/S
RIGHT VENTRICLE PEAK SYSTOLIC PRESSURE: 40.5 MMHG
SODIUM SERPL-SCNC: 139 MMOL/L (ref 136–145)
SP GR UR STRIP.AUTO: 1.03
TRICUSPID ANNULAR PLANE SYSTOLIC EXCURSION: 2.1 CM
TRIGL SERPL-MCNC: 107 MG/DL (ref 0–149)
UROBILINOGEN UR STRIP.AUTO-MCNC: NORMAL MG/DL
VLDL: 21 MG/DL (ref 0–40)
WBC # BLD AUTO: 9.1 X10*3/UL (ref 4.4–11.3)
WBC #/AREA URNS AUTO: ABNORMAL /HPF

## 2025-05-22 PROCEDURE — 93306 TTE W/DOPPLER COMPLETE: CPT

## 2025-05-22 PROCEDURE — 80061 LIPID PANEL: CPT

## 2025-05-22 PROCEDURE — 2500000001 HC RX 250 WO HCPCS SELF ADMINISTERED DRUGS (ALT 637 FOR MEDICARE OP)

## 2025-05-22 PROCEDURE — 83036 HEMOGLOBIN GLYCOSYLATED A1C: CPT | Mod: PORLAB

## 2025-05-22 PROCEDURE — 82947 ASSAY GLUCOSE BLOOD QUANT: CPT

## 2025-05-22 PROCEDURE — 84100 ASSAY OF PHOSPHORUS: CPT

## 2025-05-22 PROCEDURE — 99291 CRITICAL CARE FIRST HOUR: CPT | Performed by: INTERNAL MEDICINE

## 2025-05-22 PROCEDURE — 2500000004 HC RX 250 GENERAL PHARMACY W/ HCPCS (ALT 636 FOR OP/ED)

## 2025-05-22 PROCEDURE — 83735 ASSAY OF MAGNESIUM: CPT

## 2025-05-22 PROCEDURE — 92610 EVALUATE SWALLOWING FUNCTION: CPT | Mod: GN

## 2025-05-22 PROCEDURE — 92523 SPEECH SOUND LANG COMPREHEN: CPT | Mod: GN

## 2025-05-22 PROCEDURE — 93306 TTE W/DOPPLER COMPLETE: CPT | Performed by: INTERNAL MEDICINE

## 2025-05-22 PROCEDURE — 99223 1ST HOSP IP/OBS HIGH 75: CPT | Performed by: NURSE PRACTITIONER

## 2025-05-22 PROCEDURE — 2060000001 HC INTERMEDIATE ICU ROOM DAILY

## 2025-05-22 PROCEDURE — 85025 COMPLETE CBC W/AUTO DIFF WBC: CPT

## 2025-05-22 PROCEDURE — 70551 MRI BRAIN STEM W/O DYE: CPT

## 2025-05-22 PROCEDURE — 99233 SBSQ HOSP IP/OBS HIGH 50: CPT | Performed by: INTERNAL MEDICINE

## 2025-05-22 PROCEDURE — 81001 URINALYSIS AUTO W/SCOPE: CPT

## 2025-05-22 PROCEDURE — 36415 COLL VENOUS BLD VENIPUNCTURE: CPT

## 2025-05-22 PROCEDURE — 87077 CULTURE AEROBIC IDENTIFY: CPT | Mod: PORLAB

## 2025-05-22 PROCEDURE — 80053 COMPREHEN METABOLIC PANEL: CPT

## 2025-05-22 PROCEDURE — 70551 MRI BRAIN STEM W/O DYE: CPT | Performed by: RADIOLOGY

## 2025-05-22 RX ADMIN — PANTOPRAZOLE SODIUM 40 MG: 40 TABLET, DELAYED RELEASE ORAL at 08:21

## 2025-05-22 RX ADMIN — PIPERACILLIN SODIUM AND TAZOBACTAM SODIUM 3.38 G: 3; .375 INJECTION, SOLUTION INTRAVENOUS at 02:17

## 2025-05-22 RX ADMIN — PIPERACILLIN SODIUM AND TAZOBACTAM SODIUM 3.38 G: 3; .375 INJECTION, SOLUTION INTRAVENOUS at 21:04

## 2025-05-22 RX ADMIN — PIPERACILLIN SODIUM AND TAZOBACTAM SODIUM 3.38 G: 3; .375 INJECTION, SOLUTION INTRAVENOUS at 14:20

## 2025-05-22 RX ADMIN — PIPERACILLIN SODIUM AND TAZOBACTAM SODIUM 3.38 G: 3; .375 INJECTION, SOLUTION INTRAVENOUS at 08:21

## 2025-05-22 RX ADMIN — ATORVASTATIN CALCIUM 40 MG: 40 TABLET, FILM COATED ORAL at 21:04

## 2025-05-22 RX ADMIN — POLYETHYLENE GLYCOL 3350 17 G: 17 POWDER, FOR SOLUTION ORAL at 08:21

## 2025-05-22 SDOH — ECONOMIC STABILITY: FOOD INSECURITY: WITHIN THE PAST 12 MONTHS, THE FOOD YOU BOUGHT JUST DIDN'T LAST AND YOU DIDN'T HAVE MONEY TO GET MORE.: NEVER TRUE

## 2025-05-22 SDOH — SOCIAL STABILITY: SOCIAL INSECURITY: WITHIN THE LAST YEAR, HAVE YOU BEEN AFRAID OF YOUR PARTNER OR EX-PARTNER?: NO

## 2025-05-22 SDOH — ECONOMIC STABILITY: TRANSPORTATION INSECURITY: IN THE PAST 12 MONTHS, HAS LACK OF TRANSPORTATION KEPT YOU FROM MEDICAL APPOINTMENTS OR FROM GETTING MEDICATIONS?: NO

## 2025-05-22 SDOH — SOCIAL STABILITY: SOCIAL INSECURITY: WITHIN THE LAST YEAR, HAVE YOU BEEN HUMILIATED OR EMOTIONALLY ABUSED IN OTHER WAYS BY YOUR PARTNER OR EX-PARTNER?: NO

## 2025-05-22 SDOH — ECONOMIC STABILITY: FOOD INSECURITY: WITHIN THE PAST 12 MONTHS, YOU WORRIED THAT YOUR FOOD WOULD RUN OUT BEFORE YOU GOT THE MONEY TO BUY MORE.: NEVER TRUE

## 2025-05-22 SDOH — SOCIAL STABILITY: SOCIAL INSECURITY
WITHIN THE LAST YEAR, HAVE YOU BEEN KICKED, HIT, SLAPPED, OR OTHERWISE PHYSICALLY HURT BY YOUR PARTNER OR EX-PARTNER?: NO

## 2025-05-22 SDOH — ECONOMIC STABILITY: HOUSING INSECURITY: AT ANY TIME IN THE PAST 12 MONTHS, WERE YOU HOMELESS OR LIVING IN A SHELTER (INCLUDING NOW)?: NO

## 2025-05-22 SDOH — ECONOMIC STABILITY: INCOME INSECURITY: IN THE PAST 12 MONTHS HAS THE ELECTRIC, GAS, OIL, OR WATER COMPANY THREATENED TO SHUT OFF SERVICES IN YOUR HOME?: NO

## 2025-05-22 SDOH — ECONOMIC STABILITY: HOUSING INSECURITY: IN THE PAST 12 MONTHS, HOW MANY TIMES HAVE YOU MOVED WHERE YOU WERE LIVING?: 0

## 2025-05-22 SDOH — ECONOMIC STABILITY: HOUSING INSECURITY: IN THE LAST 12 MONTHS, WAS THERE A TIME WHEN YOU WERE NOT ABLE TO PAY THE MORTGAGE OR RENT ON TIME?: YES

## 2025-05-22 SDOH — SOCIAL STABILITY: SOCIAL INSECURITY
WITHIN THE LAST YEAR, HAVE YOU BEEN RAPED OR FORCED TO HAVE ANY KIND OF SEXUAL ACTIVITY BY YOUR PARTNER OR EX-PARTNER?: NO

## 2025-05-22 SDOH — ECONOMIC STABILITY: FOOD INSECURITY: HOW HARD IS IT FOR YOU TO PAY FOR THE VERY BASICS LIKE FOOD, HOUSING, MEDICAL CARE, AND HEATING?: NOT HARD AT ALL

## 2025-05-22 ASSESSMENT — PAIN SCALES - GENERAL
PAINLEVEL_OUTOF10: 0 - NO PAIN

## 2025-05-22 ASSESSMENT — PAIN - FUNCTIONAL ASSESSMENT
PAIN_FUNCTIONAL_ASSESSMENT: 0-10

## 2025-05-22 ASSESSMENT — ACTIVITIES OF DAILY LIVING (ADL): LACK_OF_TRANSPORTATION: NO

## 2025-05-22 ASSESSMENT — VISUAL ACUITY: VA_NORMAL: 1

## 2025-05-22 NOTE — CONSULTS
Critical Care Medicine Consult      Reason For Consult  Stroke, LVO, TNK given    History Of Present Illness  Danika Demarco is a 89 y.o. female with past medical history of atrial fibrillation (not on OAC 2/2 history of traumatic subdural hematoma after a fall), aortic stenosis, hypertension, anxiety, ulcerative colitis, diabetes mellitus type 2, hyperlipidemia, traumatic subdural hematoma after fall (10 years ago), OAB, and recurrent UTI presented to [facility][1] ED with global aphasia and right sided facial droop. Her last known well was around 1445.  Per chart review, noted patient's grandson stated the patient had slurred speech and speaking gibberish and he talked to his grandfather/patient's  who called EMS.  Upon EMS arrival, patient became muted with right sided weakness.  On arrival to ED, patient noted to be completely plegic on right side with left gaze deviation and global aphasia.  Upon ED workup, temperature 36.7 °C, heart rate 119, respiratory rate 16, blood pressure 173/101, and SpO2 94% on room air. ED labs revealed blood glucose 105, sodium 138, potassium 4.6 (mild hemolysis), chloride 101, bicarbonate 25, anion gap 17, BUN 19, creatinine 0.60, PT 12.7, INR 1.1, APTT 23, WBC 7.3, RBC 4.53, hemoglobin 14.4, hematocrit 43.8, and platelets 159.  Stroke alert was initiated in ED.  CT brain attack head without contrast revealed stable old anterolateral right frontal parietal craniotomy defects, no acute intracranial bleed or focal mass effect, mild volume loss, mild chronic white matter ischemic disease in the deep periventricular regions, very mild paranasal sinusitis.  CT brain attack angio head and neck revealed tapering of the distal left M1 and occlusion of the dominant anterior branch of the left M2 from the origin, and the smaller posterior left M2 branch appears patent, the remainder of the large neck and intracranial vessels are widely patent without focal stenosis, and partially visualized  small pleural effusions.  Chest x-ray revealed cardiomegaly with mild interstitial prominence diffusely and mild blunting of the right costophrenic angles, mild interstitial edema/CHF not excluded.  ED interventions included TNK 1602 and admission to ICU for further management.    Patient seen and examined in ICU bed 7.  Patient alert and cooperative. She has severe aphasia and appears frustrated when unable to answer questions correctly. She was unable to state name or age but able to state month of birth. Information for HPI obtained through chart review as patient unable to contribute information for history given her aphasia. She denies any headache, vision changes, dizziness, lightheadedness, chest pain, palpitations, shortness of breath, nausea, vomiting, abdominal pain, or any other complaints.     Medical History[1]  Surgical History[2]  Prescriptions Prior to Admission[3]  Patient has no known allergies.  Social History[4]  Family History[5]    Scheduled Medications:   Scheduled Medications[6]     Continuous Medications:   Continuous Medications[7]     PRN Medications:   PRN Medications[8]    Review of Systems:  Review of Systems   Reason unable to perform ROS: aphasia.        Objective   Vitals:  Most Recent:  Vitals:    05/21/25 2011   BP: (!) 172/107   Pulse: 92   Resp:    Temp:    SpO2:        24hr Min/Max:  Temp  Min: 36.5 °C (97.7 °F)  Max: 37 °C (98.6 °F)  Pulse  Min: 81  Max: 119  BP  Min: 133/103  Max: 173/101  Resp  Min: 14  Max: 28  SpO2  Min: 94 %  Max: 100 %      No intake or output data in the 24 hours ending 05/21/25 2020        Physical exam:    Physical Exam  Constitutional:       General: She is not in acute distress.     Appearance: Normal appearance. She is not ill-appearing.   HENT:      Head: Normocephalic.      Nose: Nose normal.      Mouth/Throat:      Mouth: Mucous membranes are moist.   Eyes:      Extraocular Movements: Extraocular movements intact.      Conjunctiva/sclera:  Conjunctivae normal.      Pupils: Pupils are equal, round, and reactive to light.   Cardiovascular:      Rate and Rhythm: Normal rate. Rhythm irregular.      Pulses: Normal pulses.      Heart sounds: Murmur heard.      Systolic murmur is present with a grade of 3/6.   Pulmonary:      Effort: Pulmonary effort is normal. No respiratory distress.      Breath sounds: Normal breath sounds.   Abdominal:      General: Bowel sounds are normal. There is distension.      Palpations: Abdomen is soft.      Tenderness: There is no abdominal tenderness. There is no guarding.   Musculoskeletal:         General: Normal range of motion.      Cervical back: Normal range of motion.   Skin:     General: Skin is warm and dry.      Capillary Refill: Capillary refill takes less than 2 seconds.   Neurological:      Mental Status: She is alert. She is disoriented.      GCS: GCS eye subscore is 4. GCS verbal subscore is 4. GCS motor subscore is 6.      Cranial Nerves: Dysarthria and facial asymmetry present.      Sensory: Sensation is intact.      Motor: Motor function is intact.      Comments: Alert and cooperative. Unable to state name, month, year, place, or situation. Severe aphasia and mild dysarthria on exam. Hand grasps 5/5 and equal bilaterally. Dorsiflexion and plantar flexion equal bilaterally.  See NIHSS below   Psychiatric:         Attention and Perception: Attention normal.         Mood and Affect: Mood normal.         Behavior: Behavior normal. Behavior is cooperative.           1a  Level of consciousness: 0=alert; keenly responsive   1b. LOC questions:  2=Performs neither task correctly   1c. LOC commands: 0=Performs both tasks correctly   2.  Best Gaze: 1=partial gaze palsy   3. Visual: 1=Partial hemianopia   4. Facial Palsy: 1=Minor paralysis (flattened nasolabial fold, asymmetric on smiling)   5a. Motor left arm: 0=No drift, limb holds 90 (or 45) degrees for full 10 seconds   5b.  Motor right arm: 0=No drift, limb holds 90  (or 45) degrees for full 10 seconds   6a. motor left le=No drift, limb holds 90 (or 45) degrees for full 10 seconds   6b  Motor right le=No drift, limb holds 90 (or 45) degrees for full 10 seconds   7. Limb Ataxia: 0=Absent   8.  Sensory: 0=Normal; no sensory loss   9. Best Language:  2=Severe Aphasia: fragmentary expression, inference needed, cannot identify materials   10. Dysarthria: 1=Mild to moderate, patient slurs at least some words and at worst, can be understood with some difficulty   11. Extinction and Inattention: 0=No abnormality     Total:   8          Lab/Radiology/Diagnostic Review:  Results for orders placed or performed during the hospital encounter of 25 (from the past 24 hours)   POCT GLUCOSE   Result Value Ref Range    POCT Glucose 109 (H) 74 - 99 mg/dL   Comprehensive metabolic panel   Result Value Ref Range    Glucose 105 (H) 74 - 99 mg/dL    Sodium 138 136 - 145 mmol/L    Potassium 4.6 3.5 - 5.3 mmol/L    Chloride 101 98 - 107 mmol/L    Bicarbonate 25 21 - 32 mmol/L    Anion Gap 17 10 - 20 mmol/L    Urea Nitrogen 19 6 - 23 mg/dL    Creatinine 0.60 0.50 - 1.05 mg/dL    eGFR 83 >60 mL/min/1.73m*2    Calcium 8.7 8.6 - 10.3 mg/dL    Albumin 3.6 3.4 - 5.0 g/dL    Alkaline Phosphatase 63 33 - 136 U/L    Total Protein 6.9 6.4 - 8.2 g/dL    AST 27 9 - 39 U/L    Bilirubin, Total 0.8 0.0 - 1.2 mg/dL    ALT 12 7 - 45 U/L   Troponin I, High Sensitivity   Result Value Ref Range    Troponin I, High Sensitivity 10 0 - 13 ng/L   CBC and Auto Differential   Result Value Ref Range    WBC 7.3 4.4 - 11.3 x10*3/uL    nRBC 0.0 0.0 - 0.0 /100 WBCs    RBC 4.53 4.00 - 5.20 x10*6/uL    Hemoglobin 14.4 12.0 - 16.0 g/dL    Hematocrit 43.8 36.0 - 46.0 %    MCV 97 80 - 100 fL    MCH 31.8 26.0 - 34.0 pg    MCHC 32.9 32.0 - 36.0 g/dL    RDW 13.5 11.5 - 14.5 %    Platelets 159 150 - 450 x10*3/uL    Neutrophils % 71.6 40.0 - 80.0 %    Immature Granulocytes %, Automated 0.3 0.0 - 0.9 %    Lymphocytes % 17.4 13.0  - 44.0 %    Monocytes % 8.3 2.0 - 10.0 %    Eosinophils % 1.6 0.0 - 6.0 %    Basophils % 0.8 0.0 - 2.0 %    Neutrophils Absolute 5.23 1.60 - 5.50 x10*3/uL    Immature Granulocytes Absolute, Automated 0.02 0.00 - 0.50 x10*3/uL    Lymphocytes Absolute 1.27 0.80 - 3.00 x10*3/uL    Monocytes Absolute 0.61 0.05 - 0.80 x10*3/uL    Eosinophils Absolute 0.12 0.00 - 0.40 x10*3/uL    Basophils Absolute 0.06 0.00 - 0.10 x10*3/uL   Protime-INR   Result Value Ref Range    Protime 12.7 (H) 9.8 - 12.4 seconds    INR 1.1 0.9 - 1.1   APTT   Result Value Ref Range    aPTT 23 (L) 26 - 36 seconds   B-type natriuretic peptide   Result Value Ref Range     (H) 0 - 99 pg/mL     Imaging  XR chest 1 view  Result Date: 5/21/2025  1.  Cardiomegaly with mild interstitial prominence diffusely and mild blunting of the right costophrenic angles. Mild interstitial edema/CHF not excluded. Short-term follow-up PA and lateral as clinically warranted.       MACRO: None   Signed by: Jennifer Carvajal 5/21/2025 4:46 PM Dictation workstation:   [id number]    CT brain attack angio head and neck W and WO IV contrast  Result Date: 5/21/2025  Tapering of the distal left M1 and occlusion of the dominant anterior branch of left M2 from the origin. The smaller posterior left M2 branch appears patent. The remainder of the large neck and intracranial vessels are widely patent without focal stenosis.   Partially visualized small pleural effusions.   Bautista Guerrero discussed the significance and urgency of this critical finding by [facility] with  DEANDRE ROCHA on 5/21/2025 at 4:11 pm. (**-RCF-**) Findings:  See findings.     MACRO: None   Signed by: Bautista Guerrero 5/21/2025 4:12 PM Dictation workstation:   [id number]    CT brain attack head wo IV contrast  Result Date: 5/21/2025  Stable old anterolateral right frontal parietal craniotomy defect.   No acute intracranial bleed or focal mass effect.   Mild volume loss.   Mild chronic white matter ischemic disease in  the deep periventricular regions.   Very mild paranasal sinusitis as described.   MACRO: Sonny Gipson discussed the significance and urgency of this critical finding by epic secure chat with  DEANDRE GODOYISMAEL on 5/21/2025 at 3:58 pm.  (**-RCF-**) Findings:  See findings.   Signed by: Sonny Gipson 5/21/2025 3:58 PM Dictation workstation:   [id number]    XR ribs right 2 views w chest pa or ap  Result Date: 5/18/2025  No rib fracture seen. Limited by osteopenia. No pneumothorax.   Emphysematous changes.     MACRO: None   Signed by: Yfn Mai 5/18/2025 2:53 PM Dictation workstation:   [id number]      Cardiology, Vascular, and Other Imaging  No other imaging results found for the past 7 days      Assessment/Plan   Assessment & Plan  Cerebrovascular accident (CVA), unspecified mechanism (Multi)      Danika Demarco is a 89 y.o. female with past medical history of atrial fibrillation (not on OAC 2/2 history of traumatic subdural hematoma after a fall), aortic stenosis, hypertension, anxiety, ulcerative colitis, diabetes mellitus type 2, hyperlipidemia, traumatic subdural hematoma after fall (10 years ago), OAB, and recurrent UTI presented to [facility] ED with global aphasia and right sided facial droop. Her last known well was around 1445.    Acute CVA with left M2 occlusion and tapering of distal left M1 s/p TNKAse  - Presented with global aphasia, right-sided facial droop, and right-sided plegia  - CTA head and neck revealed tapering of the distal left M1 and occlusion of the dominant anterior branch of the left M2 from the origin, and the smaller posterior left M2 branch appears patent, the remainder of the large neck and intracranial vessels are widely patent without focal stenosis, and partially visualized small pleural effusions.  - NIHSS 24 in ED  - S/p TNKase at 1602  - NIHSS and neurochecks per protocol  - Continuous telemetry monitoring  - Allow for permissive hypertension with BP to be maintained at or below  180/105mmHg  - PRN labetalol and hydralazine for systolic blood pressure greater than 180 mmHg or diastolic blood pressure greater than 105 mmHg  - Hold home antihypertensives to allow for permissive hypertension, resume as appropriate  - Check hemoglobin A1c  - Check lipid panel  -TTE complete with bubble study  - Continuous telemetry monitoring  - MRI brain without contrast 5/22/2025 at 1600 (24 hours s/p TNKase), if unable to obtain will need repeat CT head 24 hours s/p TNKase  - Follow CBC, BMP, magnesium, phosphorus  - Aspiration precautions  - Fall precautions  - Hold all antiplatelets and anticoagulant medications for 24 hours post TNK. If repeat head imaging negative for hemorrhagic conversion, will start ASA   - NPO until nurse bedside swallow assessment  - PT/OT/SLP eval  -If develops any significant worsening in neuro assessment or headache, will obtain STAT CT head  - Neurology consult, input appreciated    Atrial fibrillation   -Atrial fibrillation controlled rate on telemetry  - Not on oral anticoagulation due to history of traumatic subdural hematoma after a fall  - Hold home metoprolol succinate to allow for permissive hypertension, resume as appropriate  - Continuous telemetry monitoring  - ECG as needed  - Follow BMP, magnesium, phosphorus  - Replete electrolytes as necessary    Hypertension  - Hold home antihypertensives to allow for permissive hypertension, resume as appropriate  - PRN labetalol and hydralazine for systolic blood pressure greater than 180 or diastolic blood pressure greater than 105    Diabetes mellitus type 2  - Not currently on medication therapy  - Hemoglobin A1c 6.0% on 1/31/2025  - POCT glucose checks AC and at bedtime  - Hypoglycemia protocol as needed  - Check hemoglobin A1c    DVT prophylaxis  - SCDs  - Chemoprophylaxis deferred s/p TNKase    GI prophylaxis  - PPI daily  - GlycoLax daily    Code status  - DNR      I spent 60 minutes of cumulative critical care time with  the patient.  Greater than 50% of that time was spent in the direct collaboration and or coordination of care of the patient.     Dragon dictation software was used to dictate this note and thus there may be minor errors in translation/transcription including garbled speech or misspellings. Please contact for clarification if needed.          [1]   Past Medical History:  Diagnosis Date    Abnormal levels of other serum enzymes     Elevated liver enzymes    Personal history of other (healed) physical injury and trauma     History of traumatic subdural hematoma    Personal history of other diseases of urinary system     History of hematuria    Personal history of urinary (tract) infections     History of urinary tract infection    Urinary bladder incontinence 09/25/2023   [2]   Past Surgical History:  Procedure Laterality Date    OTHER SURGICAL HISTORY  01/21/2020    Bladder surgery    OTHER SURGICAL HISTORY  01/21/2020    Hysterectomy    OTHER SURGICAL HISTORY  01/21/2020    Craniotomy    OTHER SURGICAL HISTORY  01/21/2020    Varicose vein ligation    OTHER SURGICAL HISTORY  01/21/2020    Cholecystectomy    OTHER SURGICAL HISTORY  01/21/2020    Hernia repair    OTHER SURGICAL HISTORY  01/21/2020    Oophorectomy bilateral   [3]   Medications Prior to Admission   Medication Sig Dispense Refill Last Dose/Taking    aspirin 325 mg tablet Take 1 tablet (325 mg) by mouth once daily.       lidocaine (Lidoderm) 5 % patch Place 1 patch over 12 hours on the skin once daily. Apply to painful area 12 hours per day, remove for 12 hours. 10 patch 0     lisinopril 40 mg tablet Take 1 tablet by mouth once daily 90 tablet 1     metoprolol succinate XL (Toprol-XL) 25 mg 24 hr tablet TAKE 1 TABLET BY MOUTH ONCE DAILY WITH  50MG  TAB  TO  TOTAL  75MG 90 tablet 3     metoprolol succinate XL (Toprol-XL) 50 mg 24 hr tablet TAKE 1 TABLET BY MOUTH ONCE DAILY WITH  25MG  TAB  TO  TOTAL  75MG 90 tablet 3     nitrofurantoin,  macrocrystal-monohydrate, (Macrobid) 100 mg capsule Take 1 pill daily for 3 months (Patient not taking: Reported on 5/21/2025) 90 capsule 0 Not Taking    vibegron (Gemtesa) 75 mg tablet Take 1 tablet (75 mg) by mouth once daily. 90 tablet 3    [4]   Social History  Tobacco Use    Smoking status: Never     Passive exposure: Never    Smokeless tobacco: Never   Vaping Use    Vaping status: Never Used   Substance Use Topics    Alcohol use: Yes     Comment: occasionally    Drug use: Never   [5]   Family History  Problem Relation Name Age of Onset    Breast cancer Sister      Diabetes Brother      Prostate cancer Brother     [6] atorvastatin, 40 mg, oral, Nightly  labetaloL, 10 mg, intravenous, Once  [Held by provider] lisinopril, 40 mg, oral, Daily  [Held by provider] metoprolol succinate XL, 25 mg, oral, Daily  [Held by provider] metoprolol succinate XL, 50 mg, oral, Daily  [START ON 5/22/2025] pantoprazole, 40 mg, oral, Daily before breakfast   Or  [START ON 5/22/2025] pantoprazole, 40 mg, intravenous, Daily before breakfast  [START ON 5/22/2025] polyethylene glycol, 17 g, oral, Daily  [7]    [8] PRN medications: acetaminophen, hydrALAZINE **FOLLOWED BY** [START ON 5/23/2025] hydrALAZINE, labetaloL, ondansetron

## 2025-05-22 NOTE — PROGRESS NOTES
Danika Demarco is a 89 y.o. female on day 1 of admission presenting with Cerebrovascular accident (CVA), unspecified mechanism (Multi).      Subjective      Danika Demarco is a 89 y.o. female with PMHx s/f A-fib (not on OAC 2/2 history of traumatic subdural hematoma after a fall), HTN, aortic stenosis, ulcerative colitis, HLD, T2DM presenting with global aphasia and right facial droop. LKW around 1445. Per family, her grandson notes that she is slurring speech and speaking gibberish. He talked to his grandpa/patient's  who called EMS. On EMS arrival, she became muted with right sided weakness. Later, she was found to be completely plegic on right side in ED with left gaze deviation and global aphasia. Family reports she also had a fall about a week ago, they went to urgent care due to her right rib pain, no acute rib fractures and is on lidocaine patches.     ED Course:   Vitals on presentation: T 36.7 °C (98.1 °F)  HR (!) 119  BP (!) 173/101  RR 16  O2 94 % None (Room air)  Labs:   CBC with WBC 7.3, Hgb 14.4, Plts 159.   CMP with glucose 105, Na 138, K 4.6, BUN 19, sCr 0.60, alk phos 63, ALT 12, AST 27, bilirubin 0.8. .   Trop 10.   EKG: A-fib at 100 bpm  Imaging - agree with radiology interpretation(s):   CT head-stable old anterolateral right frontal parietal craniotomy defect.  No acute intracranial bleed or focal mass effect.  Mild volume loss.  Mild chronic white matter ischemic disease.  CTA head and neck-tapering of distal left M1 and occlusion of dominant anterior branch of left M2.  Small posterior left M2 branches appear patent.  Interventions: Received TNK around 1600, admission to ICU for further management  5/22: Patient was seen and examined.  Status post TNK with significant improvement in her right upper extremity movements and ability to speech.  MRI and MRA was still pending to be done later today.  Discussed extensively with patient daughter.  Continue to hold DAPT for now.  Echocardiogram  with bubble study still pending report.  Continue permissive hypertension.  Neurologist recommendations appreciated.  Repeat CBC and BMP in AM.  Objective     Last Recorded Vitals  /68   Pulse 78   Temp 37.3 °C (99.1 °F) (Temporal)   Resp 24   Wt 64.8 kg (142 lb 13.7 oz)   SpO2 96%   Intake/Output last 3 Shifts:    Intake/Output Summary (Last 24 hours) at 5/22/2025 1231  Last data filed at 5/22/2025 0851  Gross per 24 hour   Intake 50 ml   Output 500 ml   Net -450 ml       Admission Weight  Weight: 69 kg (152 lb 1.9 oz) (05/21/25 1554)    Daily Weight  05/22/25 : 64.8 kg (142 lb 13.7 oz)    Image Results  Transthoracic [place] [address]       Phone [phone] Fax [phone]    TRANSTHORACIC ECHOCARDIOGRAM REPORT    Patient Name:       JASON Tello Physician:    [id number] Xavi Zayas DO  Study Date:         5/22/2025            Ordering Provider:    [id number] SHANT JASSO  MRN/PID:            [id number]             Fellow:  Accession#:         [id number]         Nurse:                Adia Lucas RN  Date of Birth/Age:  9/12/1930 / 89 years Sonographer:          Deisy Robles RDCS  Gender Assigned at  F                    Additional Staff:  Birth:  Height:             160.02 cm            Admit Date:           5/21/2025  Weight:             64.41 kg             Admission Status:     Inpatient -                                                                 Routine  BSA / BMI:          1.67 m2 / 25.15      Department Location:  [facility]                      kg/m2  Blood Pressure: 120 /79 mmHg    Study Type:    TRANSTHORACIC ECHO (TTE) COMPLETE  Diagnosis/ICD: Cerebral Infarction, unspecified-I63.9; Cerebral infarction  due                 to unspecified occlusion or stenosis of left middle cerebral                 artery-I63.512  Indication:    STROKE  CPT Codes:     Echo Complete w Full [id number]    Patient History:  Pertinent History: A-Fib and HTN.    Study Detail: The following Echo studies were performed: 2D, M-Mode, Doppler and                color flow. Technically challenging study due to patient lying in                supine position. Agitated saline used as a contrast agent for                intraseptal flow evaluation.       PHYSICIAN INTERPRETATION:  Left Ventricle: The left ventricular systolic function is normal, with a Nicole's biplane calculated ejection fraction of 63%. There are no regional wall motion abnormalities. The left ventricular cavity size is normal. There is mild increased septal and normal posterior left ventricular wall thickness. Left ventricular diastolic filling was indeterminate due to atrial fibrillation/flutter.  Left Atrium: The left atrial size is mildly dilated. A bubble study using agitated saline was performed. Bubble study is negative.  Right Ventricle: The right ventricle is normal in size. There is normal right ventricular global systolic function.  Right Atrium: The right atrial size is normal.  Aortic Valve: The aortic valve is trileaflet. There is mild aortic valve cusp calcification. The aortic valve dimensionless index is 0.57. There is no evidence of aortic valve regurgitation. The peak instantaneous gradient of the aortic valve is 9 mmHg. The mean gradient of the aortic valve is 5 mmHg.  Mitral Valve: The mitral valve is mildly thickened. There is mild mitral annular calcification. There is mild to moderate mitral valve regurgitation.  Tricuspid Valve: The tricuspid valve is structurally normal. There is moderate tricuspid regurgitation. The Doppler estimated RVSP is mildly elevated right ventricular systolic pressure at 40.5 mmHg.  Pulmonic Valve: The pulmonic valve is  structurally normal. There is trace to mild pulmonic valve regurgitation.  Pericardium: No pericardial effusion noted.  Aorta: The aortic root is normal.  Systemic Veins: The inferior vena cava appears normal in size, with IVC inspiratory collapse greater than 50%.       CONCLUSIONS:   1. The left ventricular systolic function is normal, with a Nicole's biplane calculated ejection fraction of 63%.   2. Left ventricular diastolic filling was indeterminate due to atrial fibrillation/flutter.   3. There is normal right ventricular global systolic function.   4. Mild to moderate mitral valve regurgitation.   5. Mildly elevated right ventricular systolic pressure.   6. Moderate tricuspid regurgitation.    QUANTITATIVE DATA SUMMARY:     2D MEASUREMENTS:             Normal Ranges:  Ao Root d:       3.00 cm     (2.0-3.7cm)  LAs:             5.00 cm     (2.7-4.0cm)  IVSd:            1.20 cm     (0.6-1.1cm)  LVPWd:           0.80 cm     (0.6-1.1cm)  LVIDd:           4.40 cm     (3.9-5.9cm)  LVIDs:           3.00 cm  LV Mass Index:   88.4 g/m2  LVEDV Index:     29.60 ml/m2  LV % FS          31.8 %       LEFT ATRIUM:                  Normal Ranges:  LA Vol A4C:        64.3 ml    (22+/-6mL/m2)  LA Vol A2C:        54.7 ml  LA Vol BP:         59.7 ml  LA Vol Index A4C:  38.4ml/m2  LA Vol Index A2C:  32.7 ml/m2  LA Vol Index BP:   35.7 ml/m2  LA Area A4C:       21.3 cm2  LA Area A2C:       19.8 cm2  LA Major Axis A4C: 6.0 cm  LA Major Axis A2C: 6.1 cm  LA Volume Index:   35.7 ml/m2       RIGHT ATRIUM:          Normal Ranges:  RA Area A4C:  16.7 cm2       AORTA MEASUREMENTS:         Normal Ranges:  Ao Sinus, d:        3.00 cm (2.1-3.5cm)  Ao STJ, d:          2.60 cm (1.7-3.4cm)  Asc Ao, d:          3.50 cm (2.1-3.4cm)       LV SYSTOLIC FUNCTION:                       Normal Ranges:  EF-A4C View:    63 % (>=55%)  EF-A2C View:    63 %  EF-Biplane:     63 %  LV EF Reported: 63 %       LV DIASTOLIC FUNCTION:           Normal  Ranges:  MV Peak E:             1.32 m/s  (0.7-1.2 m/s)  MV Peak A:             0.38 m/s  (0.42-0.7 m/s)  E/A Ratio:             3.48      (1.0-2.2)  MV e'                  0.098 m/s (>8.0)  MV lateral e'          0.13 m/s  MV medial e'           0.07 m/s  E/e' Ratio:            13.42     (<8.0)       MITRAL VALVE:          Normal Ranges:  MV DT:        133 msec (150-240msec)       MITRAL INSUFFICIENCY:             Normal Ranges:  PISA Radius:          0.5 cm  MR VTI:               154.50 cm  MR Vmax:              532.50 cm/s       AORTIC VALVE:                     Normal Ranges:  AoV Vmax:                1.51 m/s (<=1.7m/s)  AoV Peak P.1 mmHg (<20mmHg)  AoV Mean P.0 mmHg (1.7-11.5mmHg)  LVOT Max Robert:            0.91 m/s (<=1.1m/s)  AoV VTI:                 28.80 cm (18-25cm)  LVOT VTI:                16.50 cm  LVOT Diameter:           2.00 cm  (1.8-2.4cm)  AoV Area, VTI:           1.80 cm2 (2.5-5.5cm2)  AoV Area,Vmax:           1.89 cm2 (2.5-4.5cm2)  AoV Dimensionless Index: 0.57       RIGHT VENTRICLE:  RV Basal 3.50 cm  RV Mid   2.70 cm  RV Major 4.1 cm  TAPSE:   21.3 mm  RV s'    0.11 m/s       TRICUSPID VALVE/RVSP:          Normal Ranges:  Peak TR Velocity:     3.06 m/s  Est. RA Pressure:     3 mmHg  RV Syst Pressure:     40 mmHg  (< 30mmHg)  IVC Diam:             1.50 cm       [id number] Xavi Zayas DO  Electronically signed on 2025 at 9:20:49 AM       ** Final **      Physical Exam  Vitals:    25 1200   BP: 115/68   Pulse: 78   Resp: 24   Temp:    SpO2: 96%     Constitutional: Pleasant and cooperative. Laying in bed in no acute distress.    Skin: Warm and dry; no obvious lesions, rashes, pallor, or jaundice.   Eyes: EOMI. Anicteric sclera.   ENT: Mucous membranes moist  Head and Neck: Normocephalic, atraumatic   Respiratory: Nonlabored on RA. Lungs clear to auscultation bilaterally without obvious adventitious sounds. Chest rise is equal.  Cardiovascular: Irregular rhythm.  Murmur present. Extremities are warm and well-perfused with good capillary refill (< 3 seconds). No chest wall tenderness.   GI: Abdomen soft, nontender, nondistended. No obvious organomegaly appreciated. Bowel sounds are present.  : No CVA tenderness.   MSK: No gross abnormalities appreciated. No limitations to AROM/PROM appreciated.   Extremities: No cyanosis, edema, or clubbing evident. Neurovascularly intact.   Neuro: See below for details  Psych: Appropriate mood and behavior.  Relevant Results               This patient currently has cardiac telemetry ordered; if you would like to modify or discontinue the telemetry order, click here to go to the orders activity to modify/discontinue the order.              Assessment & Plan  Cerebrovascular accident (CVA), unspecified mechanism (Multi)    Acute CVA with L M2 occlusion and tapering of distal L M1 s/p TNK  -CTA head and neck-tapering of distal left M1 and occlusion of dominant anterior branch of left M2  -MRI Brain w/o contrast (24 hours s/p TNK)   -Echocardiogram with bubble study  -Telemetry   -Lipid panel on 1/31/25: unremarkable and LDL within goal at 47  -Holding antithrombotic agents, anticoagulants, or antiplatelet drugs for at least 24 hrs per TNK protocol   -Early permissive hypertension; post thrombolysis with BP maintained at or below 180/105 mmHg  -aspiration and fall precautions  -PT/OT/SLP   -Neurology consultation, appreciate further recommendations     Atrial fibrillation  - Not on oral anticoagulations due to history of subdural hematoma  - Hold metoprolol succinate for permissive hypertension     Abnormal CXR  -CXR with cardiomegaly with mild interstitial prominence diffusely  -BNP pending  -pt appears euvolemic on exam     HTN  - Hold antihypertensives as outlined above     T2DM  -A1c 6.0% on 1/31/2025  -Currently not on med therapy, BS well-controlled     Diet: NPO (Sips with meds) until swallow eval  DVT Prophylaxis: SCDs,  Chemoprophylaxis deferred -- see above   Code Status: DNR   Additional Sources Reviewed: ED note day of admission; Primary Care / PCP / Family Medicine and Cardiology     Anticipated Length of Stay (LOS): Patient will require two-plus midnight stay for further evaluation and management of the above.            Spent 35 mins in the follow up management of this patient.      Pari De Luna MD

## 2025-05-22 NOTE — PROGRESS NOTES
Physical Therapy                 Therapy Communication Note    Patient Name: Danika Demarco  MRN: [id number]  Department: [facility]  Room: 07/07-A  Today's Date: 5/22/2025     Discipline: Physical Therapy    Missed Visit: PT Missed Visit: Yes     Missed Visit Reason: Patient in a medical procedure, Patient refused (first attempt 1450: leaving for MRI. 2nd attempt 1651: pt politely declined, too fatigued to get OOB again today. RN reports that pt has been up to chair and BSC today.)

## 2025-05-22 NOTE — CARE PLAN
The patient's goals for the shift include    Problem: General Stroke  Goal: Establish a mutual long term goal with patient by discharge  Outcome: Progressing  Goal: Demonstrate improvement in neurological exam throughout the shift  Outcome: Progressing  Goal: Maintain BP within ordered limits throughout shift  Outcome: Progressing  Goal: Participate in treatment (ie., meds, therapy) throughout shift  Outcome: Progressing  Goal: No symptoms of aspiration throughout shift  Outcome: Progressing  Goal: No symptoms of hemorrhage throughout shift  Outcome: Progressing  Goal: Tolerate enteral feeding throughout shift  Outcome: Progressing  Goal: Controlled blood glucose throughout shift  Outcome: Progressing     Problem: Safety - Adult  Goal: Free from fall injury  Outcome: Progressing     Problem: Discharge Planning  Goal: Discharge to home or other facility with appropriate resources  Outcome: Progressing     Problem: Chronic Conditions and Co-morbidities  Goal: Patient's chronic conditions and co-morbidity symptoms are monitored and maintained or improved  Outcome: Progressing     Problem: Nutrition  Goal: Nutrient intake appropriate for maintaining nutritional needs  Outcome: Progressing     Problem: Fall/Injury  Goal: Not fall by end of shift  Outcome: Progressing  Goal: Be free from injury by end of the shift  Outcome: Progressing  Goal: Verbalize understanding of personal risk factors for fall in the hospital  Outcome: Progressing  Goal: Verbalize understanding of risk factor reduction measures to prevent injury from fall in the home  Outcome: Progressing     Problem: Recent hospitalization or complication while living with HTN  Goal: Patient will effectively self-manage their HTN disease process  Outcome: Progressing     Problem: Skin  Goal: Participates in plan/prevention/treatment measures  Outcome: Progressing  Goal: Prevent/manage excess moisture  Outcome: Progressing  Goal: Prevent/minimize sheer/friction  injuries  Outcome: Progressing  Goal: Promote/optimize nutrition  Outcome: Progressing     Problem: Atrial Fibrillation  Goal: I will have no complications due to atrial fibrillation  Outcome: Progressing

## 2025-05-22 NOTE — CONSULTS
[facility] Consult Note    Inpatient consult to Neurology  Consult performed by: CHRIS Evans-CNP  Consult ordered by: Julia Oshea PA-C      This is follow up inpatient neurology consult, she was initially seen yesterday by telestroke neurology.      Subjective   Danika Demarco is a 89 y.o. female on day 1 of admission with a history of afib, hx of traumatic SDH (not on OAC for afib due to this), HTN, aortic stenosis, ulcerative colitis, HLD, and DM2 presenting with Cerebrovascular accident (CVA), unspecified mechanism (Multi). Neurology was consulted for stroke s/p TNK.    History obtained from patient and chart review.     LKW around 1445 on 5/21/25. Per family, her grandson noted that she had slurring speech and speaking gibberish. He talked to his grandpa/patient's  who called EMS. On EMS arrival, she became muted with right sided weakness. Later, she was found to be completely plegic on right side in ED with left gaze deviation and global aphasia. Family reports she also had a fall about a week ago, they went to urgent care due to her right rib pain, no acute rib fractures and is on lidocaine patches.     Initial BP was 173/101 with . Initial HCT without acute stroke or hemorrhage. Telestroke neurology saw patient and gave NIHSS 24 at that time. She was given IV TNK at 1600. Pt is on 325 mg aspirin PTA.     Currently, patient seen in ICU. Daughter at bedside. Pt is having some mild-moderate expressive and slight receptive aphasia still.     Medical History[1]  Surgical History[2]  No relevant family history has been documented for this patient.   Social History     Substance and Sexual Activity   Drug Use Never     Tobacco Use: Low Risk  (5/18/2025)    Patient History     Smoking Tobacco Use: Never     Smokeless Tobacco Use: Never     Passive Exposure: Never     Alcohol Use: Not At Risk (5/21/2025)    AUDIT-C     Frequency of Alcohol Consumption: Never     Average Number of Drinks:  "Patient does not drink     Frequency of Binge Drinking: Never       10 point review of systems performed and is negative except as noted in the HPI.        Objective   Vitals:    05/22/25 1000 05/22/25 1100 05/22/25 1114 05/22/25 1200   BP: 160/88 (!) 131/94  115/68   Pulse: 95 81  78   Resp: (!) 27 23  24   Temp:   37.3 °C (99.1 °F)    TempSrc:   Temporal    SpO2: 94% 100%  96%   Weight:       Height:             Physical Exam  Vitals reviewed.   Constitutional:       General: She is awake.   Eyes:      Extraocular Movements: Extraocular movements intact.      Pupils: Pupils are equal, round, and reactive to light.   Neurological:      Mental Status: She is alert.   Psychiatric:         Speech: Speech normal.       Neurological Exam  Mental Status  Awake and alert. Oriented only to person and situation. Aware in Hospital, states \"[facility]\"  Unable to tell me year, age or month  Does give birthdate  Unable to tell me President but can tell me daughter name. Speech is normal. Expressive aphasia and receptive aphasia present. Able to repeat. Unable to name Pen but states \"alexandra\" for thumb. Does struggle some with multi-step commands.    Cranial Nerves  CN II: Visual acuity is normal. Visual fields full to confrontation. Right funduscopic exam: not visualized. Left funduscopic exam: not visualized.  CN III, IV, VI: Extraocular movements intact bilaterally. Right ptosis. This is baseline per pt/daughter. Pupils equal round and reactive to light bilaterally.  CN V: Facial sensation is normal.  CN VII: Full and symmetric facial movement.  CN VIII: Hearing is normal.  CN IX, X: Palate elevates symmetrically. Normal gag reflex.  CN XI: Shoulder shrug strength is normal.  CN XII: Tongue midline without atrophy or fasciculations.    Motor  Normal muscle bulk throughout. No fasciculations present. Normal muscle tone. No abnormal involuntary movements.  Strength is 5/5 throughout but 5-/5 to RLE proximal " only.    Sensory  Light touch is normal in upper and lower extremities.     Coordination  Right: Finger-to-nose normal. Heel-to-shin normal.Left: Finger-to-nose normal. Heel-to-shin normal.      Scheduled medications  Scheduled Medications[3]  Continuous medications  Continuous Medications[4]  PRN medications  PRN Medications[5]     Lab Results   Component Value Date    WBC 9.1 05/22/2025    RBC 4.17 05/22/2025    HGB 13.2 05/22/2025    HCT 39.6 05/22/2025     05/22/2025     05/22/2025    K 3.8 05/22/2025     05/22/2025    BUN 16 05/22/2025    CREATININE 0.62 05/22/2025    EGFR 83 05/22/2025    CALCIUM 8.7 05/22/2025    ALKPHOS 56 05/22/2025    AST 21 05/22/2025    ALT 13 05/22/2025    MG 1.65 05/22/2025    [id number] 384 01/31/2025    VITD25 23 (L) 01/31/2025    HGBA1C 5.5 05/22/2025    LDLDIRECT 39 08/12/2022    LDLCALC 24 05/22/2025    CHOL 79 05/22/2025    HDL 33.8 05/22/2025    TRIG 107 05/22/2025    TSH 2.30 01/31/2025    TSH 3.93 10/18/2023    TSH 2.60 04/19/2023       Below CT and MRI images and reports have been personally reviewed in PACS, agree with interpretations.    Transthoracic Echo (TTE) Complete 05/22/2025    PHYSICIAN INTERPRETATION:  Left Ventricle: The left ventricular systolic function is normal, with a Nicole's biplane calculated ejection fraction of 63%. There are no regional wall motion abnormalities. The left ventricular cavity size is normal. There is mild increased septal and normal posterior left ventricular wall thickness. Left ventricular diastolic filling was indeterminate due to atrial fibrillation/flutter.  Left Atrium: The left atrial size is mildly dilated. A bubble study using agitated saline was performed. Bubble study is negative.  Right Ventricle: The right ventricle is normal in size. There is normal right ventricular global systolic function.  Right Atrium: The right atrial size is normal.  Aortic Valve: The aortic valve is trileaflet. There is mild  aortic valve cusp calcification. The aortic valve dimensionless index is 0.57. There is no evidence of aortic valve regurgitation. The peak instantaneous gradient of the aortic valve is 9 mmHg. The mean gradient of the aortic valve is 5 mmHg.  Mitral Valve: The mitral valve is mildly thickened. There is mild mitral annular calcification. There is mild to moderate mitral valve regurgitation.  Tricuspid Valve: The tricuspid valve is structurally normal. There is moderate tricuspid regurgitation. The Doppler estimated RVSP is mildly elevated right ventricular systolic pressure at 40.5 mmHg.  Pulmonic Valve: The pulmonic valve is structurally normal. There is trace to mild pulmonic valve regurgitation.  Pericardium: No pericardial effusion noted.  Aorta: The aortic root is normal.  Systemic Veins: The inferior vena cava appears normal in size, with IVC inspiratory collapse greater than 50%.      CONCLUSIONS:  1. The left ventricular systolic function is normal, with a Nicole's biplane calculated ejection fraction of 63%.  2. Left ventricular diastolic filling was indeterminate due to atrial fibrillation/flutter.  3. There is normal right ventricular global systolic function.  4. Mild to moderate mitral valve regurgitation.  5. Mildly elevated right ventricular systolic pressure.  6. Moderate tricuspid regurgitation.    CT brain attack angio head and neck W and WO IV contrast 05/21/2025    Narrative  Interpreted By:  Bautista Guerrero,  STUDY:  CT BRAIN ATTACK ANGIO HEAD AND NECK W AND WO IV CONTRAST;  5/21/2025  3:57 pm    INDICATION:  Signs/Symptoms:RIght sideed stroke.      COMPARISON:  None.    ACCESSION NUMBER(S):  [id number]    ORDERING CLINICIAN:  DEANDRE ROCHA    TECHNIQUE:  CT angiogram of head and neck was obtained after administration of  intravenous contrast with coronal and sagittal MIP reformats. 3D  volume rendering was obtained on a separate workstation.    FINDINGS:  Stable postsurgical change of right  frontal craniotomy. No evidence  of acute intracranial hemorrhage or mass effect.      Anterior circulation: Scattered calcified atherosclerotic plaque of  the bilateral carotid bifurcations with 0% stenosis by NASCET  criteria. Bilateral internal carotid arteries are widely patent  without focal stenosis. Mild atherosclerotic calcifications of the  bilateral carotid siphons without stenosis. There is a small  posterior left M2 branch which appears to be patent. There is  tapering of the distal left M1 and occlusion of the dominant left  anterior M2 from its origin of the MCA bifurcation. Right MCA is  patent. Bilateral anterior cerebral arteries are patent.    Posterior circulation: Bilateral vertebral arteries are widely patent  without focal stenosis or dissection. Basilar artery is widely patent  without focal stenosis or dissection. Proximal posterior cerebral  arteries are widely patent. There is a fetal variant left posterior  cerebral artery, normal variant.    Partially visualized small right greater than left pleural effusions.  Lung apices are otherwise grossly normal. Soft tissues of the neck  are grossly normal.    Impression  Tapering of the distal left M1 and occlusion of the dominant anterior  branch of left M2 from the origin. The smaller posterior left M2  branch appears patent. The remainder of the large neck and  intracranial vessels are widely patent without focal stenosis.    Partially visualized small pleural effusions.    Bautista Guerrero discussed the significance and urgency of this critical  finding by [facility] with  DEANDRE ROCHA on 5/21/2025 at 4:11 pm.  (**-RCF-**) Findings:  See findings.      MACRO:  None    Signed by: Bautista Guerrero 5/21/2025 4:12 PM  Dictation workstation:   [id number]      CT brain attack head wo IV contrast 05/21/2025    Narrative  Interpreted By:  Sonny Gipson,  STUDY:  CT BRAIN ATTACK HEAD WO IV CONTRAST;  5/21/2025 3:49 pm    INDICATION:  Signs/Symptoms:RIght sideed  stroke.    COMPARISON:  Prior exam from 01/08/2020..    ACCESSION NUMBER(S):  [id number]    ORDERING CLINICIAN:  DEANDRE ROCHA    TECHNIQUE:  Routine axial images were obtained from the skull base through the  vertex.  Sagittal and coronal reconstruction images were generated.  Brain, subdural, and bone windows were reviewed. N/A   N/A    FINDINGS:  INTRACRANIAL:  Previous anterolateral right frontoparietal craniotomy.  Mild prominence of ventricles and sulci. There is mild patchy  hypodensity throughout the deep periventricular white matter. No  acute intracranial bleed, midline shift, or focal mass effect. No  destructive bone lesion. No depressed skull fracture. Skullbase  arterial calcifications in the carotid siphons and vertebral arteries.    EXTRACRANIAL:  Mild inflammatory material in the left sphenoid sinus, and mild  mucosal thickening in the right maxillary sinus. Otherwise, the  visualized paranasal sinuses were clear. Visualized mastoid air cells  were clear.    Impression  Stable old anterolateral right frontal parietal craniotomy defect.    No acute intracranial bleed or focal mass effect.    Mild volume loss.    Mild chronic white matter ischemic disease in the deep  periventricular regions.    Very mild paranasal sinusitis as described.    MACRO:  Sonny Gipson discussed the significance and urgency of this critical  finding by epic secure chat with  DEANDRE ROCHA on 5/21/2025 at 3:58  pm.  (**-RCF-**) Findings:  See findings.    Signed by: Sonny Gipson 5/21/2025 3:58 PM  Dictation workstation:   [id number]      NIH Stroke Scale  1A. Level of Consciousness: Alert, Keenly Responsive  1B. Ask Month and Age: No Questions Right  1C. Blink Eyes & Squeeze Hands: Performs Both Tasks  2. Best Gaze: Normal  3. Visual: No Visual Loss  4. Facial Palsy: Normal Symmetrical Movements  5A. Motor - Left Arm: No Drift  5B. Motor - Right Arm: No Drift  6A. Motor - Left Leg: No Drift  6B. Motor - Right Leg: No  Drift  7. Limb Ataxia: Absent  8. Sensory Loss: Normal  9. Best Language: Mild-to-Moderate Aphasia  10. Dysarthria: Normal  11. Extinction and Inattention: No Abnormality  NIH Stroke Scale: 3      Madisyn Coma Scale  Best Eye Response: Spontaneous  Best Verbal Response: Confused  Best Motor Response: Follows commands  Madisyn Coma Scale Score: 14        Assessment/Plan   This patient currently has cardiac telemetry ordered; if you would like to modify or discontinue the telemetry order, click here to go to the orders activity to modify/discontinue the order.  Assessment & Plan  Cerebrovascular accident (CVA), unspecified mechanism (Multi)      IMPRESSION:  Danika Demarco is a 89 y.o. female with a history of afib, hx of traumatic SDH (not on OAC for afib due to this), HTN, aortic stenosis, ulcerative colitis, HLD, and DM2 presenting with dysarthria --> global aphasia and R hemiplegia. Had L gaze deviation in ED. Initial /101. On 325 mg aspirin PTA. Not on HI statin.   HCT - no acute stroke or hemorrhage. Stable old anterolateral R frontal parietal craniotomy defect.   CTA head/neck - tapering of distal L M1 and occlusion of dominant anterior branch of L M2 segment (small posterior L M2 branches are patent).   Lipid panel with LDL 24, CHOL 79,     Global aphasia, improving  R hemiplegia, improving  S/p TNK  HTN  Hx of afib, not on anticoagulation  Hx of traumatic SDH    RECOMMENDATIONS:  Continue supportive care  Continue to hold antiplatelets until repeat imaging complete  MRI brain wo contrast pending for 24 hour s/p TNK  If no significant stroke/bleeding on MRI, pt will likely need DAPT of aspirin 81 mg and Plavix 75 mg x 90 days (per [facility] protocol) then Plavix daily monotherapy  Discussed possible reconsideration of anticoagulation for afib (will need risk-benefit eval due to history of falling and prior SDH) - did discuss, daughter who understands risk/benefits and would like to avoid  anticoagulation still  Continue HI statin daily  Continue to monitor and manage vascular risk factors  Permissive HTN x 48-72 hrs s/p symptom onset per post TNK protocol  Continue neuro checks per post TNK protocol  Continue cardiac telemetry  Fall precautions  Continue SLP  PT/OT evals pendng    Discussed with patient.   Case reviewed with Dr. Bee.   Will continue to follow.        I personally spent 75 minutes today, exclusive of procedures, providing care for this patient, including preparation, face to face time, documentation and other services such as review of medical records, diagnostic result, patient education, counseling, coordination of care as specified in the encounter.    Carmela Mansfield, APRN-CNP           [1]   Past Medical History:  Diagnosis Date    Abnormal levels of other serum enzymes     Elevated liver enzymes    Personal history of other (healed) physical injury and trauma     History of traumatic subdural hematoma    Personal history of other diseases of urinary system     History of hematuria    Personal history of urinary (tract) infections     History of urinary tract infection    Urinary bladder incontinence 09/25/2023   [2]   Past Surgical History:  Procedure Laterality Date    OTHER SURGICAL HISTORY  01/21/2020    Bladder surgery    OTHER SURGICAL HISTORY  01/21/2020    Hysterectomy    OTHER SURGICAL HISTORY  01/21/2020    Craniotomy    OTHER SURGICAL HISTORY  01/21/2020    Varicose vein ligation    OTHER SURGICAL HISTORY  01/21/2020    Cholecystectomy    OTHER SURGICAL HISTORY  01/21/2020    Hernia repair    OTHER SURGICAL HISTORY  01/21/2020    Oophorectomy bilateral   [3] atorvastatin, 40 mg, oral, Nightly  labetaloL, 10 mg, intravenous, Once  [Held by provider] lisinopril, 40 mg, oral, Daily  [Held by provider] metoprolol succinate XL, 25 mg, oral, Daily  [Held by provider] metoprolol succinate XL, 50 mg, oral, Daily  pantoprazole, 40 mg, oral, Daily before breakfast    Or  pantoprazole, 40 mg, intravenous, Daily before breakfast  piperacillin-tazobactam, 3.375 g, intravenous, q6h  polyethylene glycol, 17 g, oral, Daily     [4]    [5] PRN medications: acetaminophen, dextrose, dextrose, glucagon, glucagon, hydrALAZINE **FOLLOWED BY** [START ON 5/23/2025] hydrALAZINE, labetaloL, ondansetron

## 2025-05-22 NOTE — PROGRESS NOTES
Speech-Language Pathology Clinical Swallow Evaluation    Patient Name: Danika Demarco  MRN: [id number]  : 1930  Patient Room: A  Today's Date: 25  Start time: Start Time: 905  Stop time: Stop Time: 940  Time calculation (min) : Time Calculation (min): 35 min    ASSESSMENT  Impressions:  Pt demonstrated inconsistent coughing throughout swallow evaluation. No wet vocal quality noted with trials. Pt coughing prior to dysphagia eval during SLE, PO intake did not demonstrate an increase in coughing.     Pt demonstrated use of safe swallow strategies with min cues. Recommending upgrading pt to baseline diet, regular and thin with use of strategies.     Pt would benefit from skilled ST to minimize aspiration risk and ensure ongoing safety with the least restrictive diet.     Additional consult/referral: N/A     Prognosis: Good      PLAN  Recommendations:  MBSS recommended: SLP will continue to monitor to determine if MBSS is clinically warranted.  Solid consistency: Regular (IDDSI level 7)  Liquid consistency: Thin (IDDSI 0)  Medication administration: Whole in thin liquid, As best tolerated, Per pt preference, Per nursing discretion  Compensatory swallow strategies:   - Upright positioning for all PO intake  - Slow rate of intake  - Small bites  - Small sips  - Small/SINGLE sips  - One bite/sip at a time  - Alternate bites and sips    Recommended frequency/duration:  Skilled SLP services recommended: Yes  Frequency: 3x/week  Duration: 2 weeks  Discharge recommendation: Unable to determine at this time; please see follow-up notes for DC recommendation.  Strengths: Motivation and Family/caregiver support  Barriers to participation in tx: Cognition      Goals (start date 2025-2025):  - Pt will consume prescribed diet (current diet is Regular/thin) without overt s/sx aspiration/penetration in 95% of observed trials.   Status: Goal initiated   Progress this date: NA     - Pt will demonstrate  follow-through of trained compensatory strategies during a meal/snack with 90% acc independently.   Status: Goal initiated   Progress this date: NA     REASON FOR ADMISSION:  CHIEF COMPLAINT: CVA    PMHx relevant to rehab: A-fib (not on OAC 2/2 history of traumatic subdural hematoma after a fall), HTN, aortic stenosis, ulcerative colitis, HLD, T2DM     Relevant imaging results:   XR Chest 5/21/2025  IMPRESSION:  1.  Cardiomegaly with mild interstitial prominence diffusely and mild  blunting of the right costophrenic angles. Mild interstitial  edema/CHF not excluded. Short-term follow-up PA and lateral as  clinically warranted.        SUBJECTIVE  SLP Received On: 05/22/25  Patient Class: Inpatient  Living Environment: Home  Ordering Physician: MD Tawanna  Reason for Referral: suspected dysphagia  Prior to Session Communication: Bedside nurse    RN cleared pt to participate in session, reported pt took sips of water earlier without concerns.    Pt reported coughing without PO intake, feels like she caught a cold.           [employer] Diet: Regular/thin  Current Diet : NPO    Pain Assessment  Pain Assessment: 0-10  0-10 (Numeric) Pain Score: 0 - No pain       Orientation: Oriented to self and Oriented to hospital but not name of facility  Ability to follow functional commands: Follows one-step commands appropriately and Follows simple commands     Baseline Vocal Quality: Normal  Volitional Cough: Strong  Volitional Swallow: Within Functional Limits  Patient positioning: Upright in bed      OBJECTIVE  Clinical swallow evaluation completed and consisted of interview, oral motor assessment, and PO trials (4 oz puree, 4 oz soft and bite, 2 curtis crackers, >4oz of thin liquids).    ORAL MOTOR: Dentition: Dentures.  Oral Hygiene: Oral mucosa were pink, moist, and free of obvious lesions. Lingual strength and ROM were WFL. Labial strength/ROM were WFL. Labial seal was adequate.     ORAL PHASE: Mastication of regular solids  was WFL.  A/P transit was WFL.  No oral residuals were seen.    PHARYNGEAL PHASE: Laryngeal elevation was visualized or palpated with all trials, however adequacy of hyolaryngeal elevation/excursion cannot be determined at bedside. No immediate or delayed s/sx aspiration/penetration were observed with any consistencies. Coughing prior to and throughout PO trials.    ESOPHAGEAL PHASE: NA      Treatment/Education:  Results and recommendations were relayed to: Patient, Bedside nurse, and Physician  Education provided: Yes   Learner: Patient   Barriers to learning: Acuteness of illness barrier and Cognitive limitations barrier   Method of teaching: Verbal   Topic: role of ST, results of assessment, recommended diet modifications, recommended safe swallow strategies, and recommendation for dysphagia follow-up   Outcome of teaching: Pt demonstrated partial understanding, Education will be reinforced during follow-up visits, as appropriate, and Needs reinforcement  Treatment provided: No    Next Treatment Priority: expressive and receptive activities, continued assessment of satey with upgraded diet.

## 2025-05-22 NOTE — PROGRESS NOTES
Speech-Language Pathology Speech/Language/Cognition Evaluation    Patient Name: Danika Demarco  MRN: [id number]  : 1930  Today's Date: 25  Start time: Start Time: 905  Stop time: Stop Time: 940  Time calculation (min) : Time Calculation (min): 35 min      ASSESSMENT  Impressions:  Pt presents with moderate-severe expressive aphasia and mild receptive. Pt would benefit from skilled ST to improve functional communication skills for identifying wants/needs, improve functional communication skills for basic thoughts/ideas, improve functional communication skills for complex thoughts/ideas, improve communicative quality of life, reduce dependence on others, promote return to prior level of function, and improve quality of life  Prognosis: Good      PLAN  Recommended communication strategies:   simplify language  give simple, 1-step commands  ask questions in yes/no format  give pt extra time to respond  use slow speech rate  speak loudly/clearly    POC:  Frequency: 3x/week  Duration: 2 weeks  Discharge recommendation: Recommend MODERATE intensity ST upon DC in order to ensure safety with least restrictive diet.  Treatment/Interventions: Other (Comment)  Strengths: Motivation and Family/caregiver support  Barriers to participation in tx: N/A    Goals (start date 2025. Anticipated time frame for goal attainment: 2 weeks)    Pt will complete expressive language tasks (automatics, responsive naming, generative naming) with 85% acc given min cues.   Status: Goal initiated   Progress this date: NA    Pt will complete receptive language tasks (2 step directions, yes/no) with 85% acc given min cues.   Status: Goal initiated   Progress this date: NA    Pt will demonstrate orientation x4 given min cues.   Status: Goal initiated   Progress this date: NA      SUBJECTIVE    PMHx relevant to rehab: A-fib (not on OAC 2/2 history of traumatic subdural hematoma after a fall), HTN, aortic stenosis, ulcerative colitis, HLD,  "T2DM     Chief complaint: CVA    Relevant imaging results:  CT brain 5/21/2025  IMPRESSION:  Stable old anterolateral right frontal parietal craniotomy defect.  No acute intracranial bleed or focal mass effect.  Mild volume loss.  Mild chronic white matter ischemic disease in the deep  periventricular regions.  Very mild paranasal sinusitis as described.    General Visit Information:    Referred By: LAUREEN Oshea  Living Environment: Home with spouse     Prior to Session Communication: Bedside nurse    RN cleared pt to participate in session reported noted improvement in communication but still having word finding deficits.    Pt reported agreeable to ST eval.    Pain Assessment  Pain Assessment: 0-10  0-10 (Numeric) Pain Score: 0 - No pain       Orientation: Oriented to self and Oriented to hospital but not name of facility - Pt unable to provide her own name, when given yes/no options pt said \"no, my name is Danika\" in response to \"is your name Danika\"     Ability to follow functional commands: Follows one-step commands appropriately and Follows simple commands  Patient positioning: Upright in bed      OBJECTIVE  The following informal assessment tasks were completed:    Oral motor:  WFL    Motor speech:  Intelligibility: mild dysarthria  Typical utterance length: 3-4 word length, impacted by word finding deficits, could not think of word to finish her sentence.   Motor speech characteristics: Slow rate  Perceptual voice characteristics: WFL  Single-word repetition: WFL  Sentence repetition: mild impairment    Receptive language:  Following 1-step commands: WFL  Following 2-step commands: 3/5 acc   Object identification in room: 0/5   Simple yes/no questions: 2/5- inconsistent, typically said \"no\" to all responses but followed up with verbalization that she meant yes when yes was the correct answer. (I.e. - do you live in [place]- \"no, I have lived in [place] my whole life\")  Complex yes/no questions: 0/5 acc- did not recognize " incorrect answer when repeated back to her.    Expressive language:  Seriated automatics: able to count from 1-5 on her finger, when asked to count to 10, started over when cues to continue to other hand. Moderate cues to initiate CHA.  Confrontation namin/5 acc, inconsistent when provided choices.   Phrase completion: WFL,   Responsive namin/5 acc - provided incorrect answers  Generative naming: impaired, unable to generate any items.  Sentence generation: Pt able to communicate limited and inconsistent information at the conversation level.  Expressive language characteristics: Off-topic, Simplified syntax, Vague language    Cognition: DNA this evaluation, focus placed on communication.      Treatment/Education:  Results and recommendations were relayed to: Patient, Bedside nurse, and Physician  Education provided: Yes   Learner: Patient   Barriers to learning: Cognitive limitations barrier and Communication limitations barrier   Method of teaching: Verbal   Topic: role of ST, results of assessment, and recommendation for speech/language/cognition therapy   Outcome of teaching: Pt/family demonstrated good understanding  Treatment provided: No  Next Treatment Priority: expressive and receptive activities.

## 2025-05-22 NOTE — PROGRESS NOTES
Social work consult placed for positive medical risk screen. SW reviewed pt's chart and communicated with TCC. No SW needs foreseen at this time. SW signing off; available upon request.    Pranav Newby, MSW, LSW ([id number])   Care Transitions

## 2025-05-22 NOTE — PROGRESS NOTES
Danika Demarco is a 89 y.o. female on day 1 of admission presenting with Cerebrovascular accident (CVA), unspecified mechanism (Multi).    Subjective   Patient is alert. She is oriented to place and time but fails to name the hospital and the month. She still has some slurring of speech at times. She has mild right hand . NIH 6.     D/w RN Esme and Adia. D/w CHRIS Joshi.    Patient has not had any acute events overnight. No signs of bleeding. She denies headaches, SOB, chest pain. Her BP remains in acceptable limites. No new issues reported on neurochecks.    Objective   Physical Exam  Constitutional:       General: She is not in acute distress.     Appearance: Normal appearance. She is not ill-appearing.   HENT:      Head: Normocephalic.      Nose: Nose normal.      Mouth/Throat:      Mouth: Mucous membranes are moist.   Eyes:      Extraocular Movements: Extraocular movements intact.      Conjunctiva/sclera: Conjunctivae normal.      Pupils: Pupils are equal, round, and reactive to light.   Cardiovascular:      Rate and Rhythm: Normal rate. Rhythm irregular.      Pulses: Normal pulses.      Heart sounds: Murmur heard.      Systolic murmur is present with a grade of 3/6.   Pulmonary:      Effort: Pulmonary effort is normal. No respiratory distress.      Breath sounds: Normal breath sounds.   Abdominal:      General: Bowel sounds are normal.        Palpations: Abdomen is soft.      Tenderness: There is no abdominal tenderness. There is no guarding.   Musculoskeletal:         General: Normal range of motion.      Cervical back: Normal range of motion.   Skin:     General: Skin is warm and dry.      Capillary Refill: Capillary refill takes less than 2 seconds.   Neurological:      Mental Status: She is alert. She needs redirection at times as still disorirented. Follows all simple commands.     GCS: GCS eye subscore is 4. GCS verbal subscore is 4. GCS motor subscore is 6.      Cranial Nerves: Dysarthria present.  "Facial asymmetry is improved.     Sensory: Sensation is intact.      Motor: Motor function is intact.      Comments: Alert and cooperative. Unable to state name, month, year, place, or situation. Severe aphasia and mild dysarthria on exam.Rt  4/5 compared to left 5/5  Psychiatric:         Attention and Perception: Attention normal.         Mood and Affect: Mood normal.         Behavior: Behavior normal. Behavior is cooperative.     Last Recorded Vitals  Blood pressure 130/83, pulse 77, temperature 37.3 °C (99.1 °F), temperature source Temporal, resp. rate 18, height 1.6 m (5' 3\"), weight 64.8 kg (142 lb 13.7 oz), SpO2 90%.  Intake/Output last 3 Shifts:  No intake/output data recorded.    Relevant Results  Reviewed.    Assessment & Plan  Cerebrovascular accident (CVA), unspecified mechanism (Multi)     Danika Demarco is a 89 y.o. female with past medical history of atrial fibrillation (not on OAC 2/2 history of traumatic subdural hematoma after a fall but 10 years ago apparently), aortic stenosis, hypertension, anxiety, ulcerative colitis, diabetes mellitus type 2, hyperlipidemia, traumatic subdural hematoma after fall (10 years ago), OAB, and recurrent UTI presented to [facility] ED with global aphasia and right sided facial droop. Her last known well was around 1445.     Acute CVA with left M2 occlusion and tapering of distal left M1 s/p TNKAse  - Presented with global aphasia, right-sided facial droop, and right-sided plegia  - CTA head and neck revealed tapering of the distal left M1 and occlusion of the dominant anterior branch of the left M2 from the origin, and the smaller posterior left M2 branch appears patent, the remainder of the large neck and intracranial vessels are widely patent without focal stenosis, and partially visualized small pleural effusions.  - NIHSS 24 in ED and this morning 6  - S/p TNKase at 1602 on 5/21/25  - NIHSS and neurochecks per protocol  - Continuous telemetry monitoring  - MRI " brain without contrast 5/22/2025 at 1600 (24 hours s/p TNKase), if unable to obtain will need repeat CT head 24 hours s/p TNKase  - PT/OT/SLP eval; did pass bedside RN evaluation to take oral meds.  -If develops any significant worsening in neuro assessment or headache, will obtain STAT CT head  - Neurology consult, input appreciated     Atrial fibrillation   -Atrial fibrillation controlled rate on telemetry  - Not on oral anticoagulation due to history of traumatic subdural hematoma after a fall  - Hold home metoprolol succinate to allow for permissive hypertension, resume as appropriate  - Continuous telemetry monitoring  -ECHO without PFO. EF 63%, RV fn normal. RVSP 40.5. No clinical signs of PH.         Hypertension  - Hold home antihypertensives to allow for permissive hypertension, resume as appropriate  - PRN labetalol and hydralazine for systolic blood pressure greater than 180 or diastolic blood pressure greater than 105     Diabetes mellitus type 2  - Not currently on medication therapy  - Hemoglobin A1c 6.0% on 1/31/2025  - POCT glucose checks AC and at bedtime  - Hypoglycemia protocol as needed  - hemoglobin A1c today 5.5.     DVT prophylaxis  - SCDs  - Chemoprophylaxis deferred s/p TNKase     GI prophylaxis  - PPI daily  - GlycoLax daily     Code status  - DNR    8. Suspected UTI  -hx of UTI before. UA with pyuria. Urine c/s pending. Not symptomatic. On IV zosyn. Follow up urine culture and deescalate atbx.      I spent 34 minutes in the professional and overall care of this patient.    Discussed with stroke coordinator. Discussed on MDT rounds. Pt may be transferred out today to SDU post 24hrs of TNK administration. Critical care team will sign off once out of the ICU. Please contact with any further questions.       Baltazar Stacy MD

## 2025-05-22 NOTE — PROGRESS NOTES
Occupational Therapy                 Therapy Communication Note    Patient Name: Danika Demarco  MRN: [id number]  Department: [facility] NON[facility]  Room: 07/07-A  Today's Date: 5/22/2025     Discipline: Occupational Therapy    Missed Visit: OT Missed Visit: Yes     Missed Visit Reason: Missed Visit Reason: Patient in a medical procedure (OT eval attempted. arrived to unit to find pt being transported to MRI.)    Missed Time: Attempt    Comment:

## 2025-05-23 ENCOUNTER — APPOINTMENT (OUTPATIENT)
Dept: RADIOLOGY | Facility: HOSPITAL | Age: OVER 89
DRG: 062 | End: 2025-05-23
Payer: MEDICARE

## 2025-05-23 LAB
ANION GAP SERPL CALC-SCNC: 12 MMOL/L (ref 10–20)
BASOPHILS # BLD AUTO: 0.04 X10*3/UL (ref 0–0.1)
BASOPHILS NFR BLD AUTO: 0.5 %
BUN SERPL-MCNC: 17 MG/DL (ref 6–23)
CALCIUM SERPL-MCNC: 8.8 MG/DL (ref 8.6–10.3)
CHLORIDE SERPL-SCNC: 104 MMOL/L (ref 98–107)
CO2 SERPL-SCNC: 28 MMOL/L (ref 21–32)
CREAT SERPL-MCNC: 0.74 MG/DL (ref 0.5–1.05)
EGFRCR SERPLBLD CKD-EPI 2021: 75 ML/MIN/1.73M*2
EOSINOPHIL # BLD AUTO: 0.22 X10*3/UL (ref 0–0.4)
EOSINOPHIL NFR BLD AUTO: 2.6 %
ERYTHROCYTE [DISTWIDTH] IN BLOOD BY AUTOMATED COUNT: 13.3 % (ref 11.5–14.5)
GLUCOSE BLD MANUAL STRIP-MCNC: 128 MG/DL (ref 74–99)
GLUCOSE BLD MANUAL STRIP-MCNC: 130 MG/DL (ref 74–99)
GLUCOSE BLD MANUAL STRIP-MCNC: 137 MG/DL (ref 74–99)
GLUCOSE BLD MANUAL STRIP-MCNC: 164 MG/DL (ref 74–99)
GLUCOSE BLD MANUAL STRIP-MCNC: 194 MG/DL (ref 74–99)
GLUCOSE SERPL-MCNC: 117 MG/DL (ref 74–99)
HCT VFR BLD AUTO: 40.9 % (ref 36–46)
HGB BLD-MCNC: 13.6 G/DL (ref 12–16)
IMM GRANULOCYTES # BLD AUTO: 0.05 X10*3/UL (ref 0–0.5)
IMM GRANULOCYTES NFR BLD AUTO: 0.6 % (ref 0–0.9)
LYMPHOCYTES # BLD AUTO: 1.78 X10*3/UL (ref 0.8–3)
LYMPHOCYTES NFR BLD AUTO: 21 %
MAGNESIUM SERPL-MCNC: 1.68 MG/DL (ref 1.6–2.4)
MCH RBC QN AUTO: 31.5 PG (ref 26–34)
MCHC RBC AUTO-ENTMCNC: 33.3 G/DL (ref 32–36)
MCV RBC AUTO: 95 FL (ref 80–100)
MONOCYTES # BLD AUTO: 0.94 X10*3/UL (ref 0.05–0.8)
MONOCYTES NFR BLD AUTO: 11.1 %
NEUTROPHILS # BLD AUTO: 5.46 X10*3/UL (ref 1.6–5.5)
NEUTROPHILS NFR BLD AUTO: 64.2 %
NRBC BLD-RTO: 0 /100 WBCS (ref 0–0)
PHOSPHATE SERPL-MCNC: 3.4 MG/DL (ref 2.5–4.9)
PLATELET # BLD AUTO: 204 X10*3/UL (ref 150–450)
POTASSIUM SERPL-SCNC: 4 MMOL/L (ref 3.5–5.3)
RBC # BLD AUTO: 4.32 X10*6/UL (ref 4–5.2)
SODIUM SERPL-SCNC: 140 MMOL/L (ref 136–145)
WBC # BLD AUTO: 8.5 X10*3/UL (ref 4.4–11.3)

## 2025-05-23 PROCEDURE — 2500000004 HC RX 250 GENERAL PHARMACY W/ HCPCS (ALT 636 FOR OP/ED): Mod: JZ | Performed by: INTERNAL MEDICINE

## 2025-05-23 PROCEDURE — 85025 COMPLETE CBC W/AUTO DIFF WBC: CPT

## 2025-05-23 PROCEDURE — 2500000004 HC RX 250 GENERAL PHARMACY W/ HCPCS (ALT 636 FOR OP/ED): Mod: JZ

## 2025-05-23 PROCEDURE — 97535 SELF CARE MNGMENT TRAINING: CPT | Mod: GO

## 2025-05-23 PROCEDURE — 71045 X-RAY EXAM CHEST 1 VIEW: CPT | Performed by: RADIOLOGY

## 2025-05-23 PROCEDURE — 80048 BASIC METABOLIC PNL TOTAL CA: CPT

## 2025-05-23 PROCEDURE — 99233 SBSQ HOSP IP/OBS HIGH 50: CPT | Performed by: NURSE PRACTITIONER

## 2025-05-23 PROCEDURE — 97166 OT EVAL MOD COMPLEX 45 MIN: CPT | Mod: GO

## 2025-05-23 PROCEDURE — 92507 TX SP LANG VOICE COMM INDIV: CPT | Mod: GN

## 2025-05-23 PROCEDURE — 36415 COLL VENOUS BLD VENIPUNCTURE: CPT

## 2025-05-23 PROCEDURE — 2500000001 HC RX 250 WO HCPCS SELF ADMINISTERED DRUGS (ALT 637 FOR MEDICARE OP): Performed by: INTERNAL MEDICINE

## 2025-05-23 PROCEDURE — 82947 ASSAY GLUCOSE BLOOD QUANT: CPT

## 2025-05-23 PROCEDURE — 71045 X-RAY EXAM CHEST 1 VIEW: CPT

## 2025-05-23 PROCEDURE — 2500000004 HC RX 250 GENERAL PHARMACY W/ HCPCS (ALT 636 FOR OP/ED)

## 2025-05-23 PROCEDURE — 2500000001 HC RX 250 WO HCPCS SELF ADMINISTERED DRUGS (ALT 637 FOR MEDICARE OP)

## 2025-05-23 PROCEDURE — 83735 ASSAY OF MAGNESIUM: CPT

## 2025-05-23 PROCEDURE — 97161 PT EVAL LOW COMPLEX 20 MIN: CPT | Mod: GP

## 2025-05-23 PROCEDURE — 84100 ASSAY OF PHOSPHORUS: CPT

## 2025-05-23 PROCEDURE — 2060000001 HC INTERMEDIATE ICU ROOM DAILY

## 2025-05-23 PROCEDURE — 99233 SBSQ HOSP IP/OBS HIGH 50: CPT | Performed by: INTERNAL MEDICINE

## 2025-05-23 RX ORDER — ASPIRIN 81 MG/1
81 TABLET ORAL DAILY
Status: DISCONTINUED | OUTPATIENT
Start: 2025-05-23 | End: 2025-05-28 | Stop reason: HOSPADM

## 2025-05-23 RX ORDER — CLOPIDOGREL BISULFATE 75 MG/1
75 TABLET ORAL DAILY
Status: DISCONTINUED | OUTPATIENT
Start: 2025-05-23 | End: 2025-05-28 | Stop reason: HOSPADM

## 2025-05-23 RX ADMIN — POLYETHYLENE GLYCOL 3350 17 G: 17 POWDER, FOR SOLUTION ORAL at 09:07

## 2025-05-23 RX ADMIN — PANTOPRAZOLE SODIUM 40 MG: 40 INJECTION, POWDER, FOR SOLUTION INTRAVENOUS at 09:07

## 2025-05-23 RX ADMIN — METOPROLOL SUCCINATE 25 MG: 50 TABLET, EXTENDED RELEASE ORAL at 11:09

## 2025-05-23 RX ADMIN — ATORVASTATIN CALCIUM 40 MG: 40 TABLET, FILM COATED ORAL at 20:12

## 2025-05-23 RX ADMIN — ASPIRIN 81 MG: 81 TABLET, COATED ORAL at 09:07

## 2025-05-23 RX ADMIN — PIPERACILLIN SODIUM AND TAZOBACTAM SODIUM 3.38 G: 3; .375 INJECTION, SOLUTION INTRAVENOUS at 02:29

## 2025-05-23 RX ADMIN — PIPERACILLIN SODIUM AND TAZOBACTAM SODIUM 3.38 G: 3; .375 INJECTION, SOLUTION INTRAVENOUS at 20:12

## 2025-05-23 RX ADMIN — PIPERACILLIN SODIUM AND TAZOBACTAM SODIUM 3.38 G: 3; .375 INJECTION, SOLUTION INTRAVENOUS at 13:17

## 2025-05-23 RX ADMIN — CLOPIDOGREL 75 MG: 75 TABLET ORAL at 09:07

## 2025-05-23 RX ADMIN — PIPERACILLIN SODIUM AND TAZOBACTAM SODIUM 3.38 G: 3; .375 INJECTION, SOLUTION INTRAVENOUS at 09:07

## 2025-05-23 ASSESSMENT — COGNITIVE AND FUNCTIONAL STATUS - GENERAL
MOVING TO AND FROM BED TO CHAIR: A LITTLE
MOVING TO AND FROM BED TO CHAIR: A LITTLE
DAILY ACTIVITIY SCORE: 16
MOVING TO AND FROM BED TO CHAIR: A LITTLE
DRESSING REGULAR UPPER BODY CLOTHING: A LITTLE
CLIMB 3 TO 5 STEPS WITH RAILING: A LOT
CLIMB 3 TO 5 STEPS WITH RAILING: A LOT
MOVING FROM LYING ON BACK TO SITTING ON SIDE OF FLAT BED WITH BEDRAILS: A LITTLE
DAILY ACTIVITIY SCORE: 19
MOBILITY SCORE: 17
EATING MEALS: A LITTLE
STANDING UP FROM CHAIR USING ARMS: A LITTLE
DRESSING REGULAR UPPER BODY CLOTHING: A LITTLE
DRESSING REGULAR LOWER BODY CLOTHING: A LITTLE
DRESSING REGULAR UPPER BODY CLOTHING: A LITTLE
DRESSING REGULAR LOWER BODY CLOTHING: A LITTLE
DRESSING REGULAR LOWER BODY CLOTHING: A LOT
MOVING FROM LYING ON BACK TO SITTING ON SIDE OF FLAT BED WITH BEDRAILS: A LITTLE
PERSONAL GROOMING: A LITTLE
WALKING IN HOSPITAL ROOM: A LITTLE
TURNING FROM BACK TO SIDE WHILE IN FLAT BAD: A LITTLE
HELP NEEDED FOR BATHING: A LOT
PERSONAL GROOMING: A LITTLE
HELP NEEDED FOR BATHING: A LITTLE
CLIMB 3 TO 5 STEPS WITH RAILING: TOTAL
MOVING FROM LYING ON BACK TO SITTING ON SIDE OF FLAT BED WITH BEDRAILS: A LITTLE
STANDING UP FROM CHAIR USING ARMS: A LITTLE
MOBILITY SCORE: 17
DAILY ACTIVITIY SCORE: 19
HELP NEEDED FOR BATHING: A LITTLE
TOILETING: A LITTLE
TURNING FROM BACK TO SIDE WHILE IN FLAT BAD: A LITTLE
TURNING FROM BACK TO SIDE WHILE IN FLAT BAD: A LITTLE
WALKING IN HOSPITAL ROOM: A LITTLE
MOBILITY SCORE: 16
STANDING UP FROM CHAIR USING ARMS: A LITTLE
TOILETING: A LITTLE
WALKING IN HOSPITAL ROOM: A LITTLE
PERSONAL GROOMING: A LITTLE
TOILETING: A LITTLE

## 2025-05-23 ASSESSMENT — PAIN - FUNCTIONAL ASSESSMENT
PAIN_FUNCTIONAL_ASSESSMENT: 0-10

## 2025-05-23 ASSESSMENT — PAIN SCALES - GENERAL
PAINLEVEL_OUTOF10: 0 - NO PAIN

## 2025-05-23 ASSESSMENT — ACTIVITIES OF DAILY LIVING (ADL)
BATHING_ASSISTANCE: MAXIMAL
HOME_MANAGEMENT_TIME_ENTRY: 10
ADL_ASSISTANCE: INDEPENDENT

## 2025-05-23 NOTE — PROGRESS NOTES
Speech-Language Pathology Speech/Language/Cognition Treatment    Patient Name: Danika Demarco  MRN: [id number]  : 1930  Today's Date: 25  Start time: Start Time: 915  Stop time: Stop Time: 940  Time calculation (min) : Time Calculation (min): 25 min    ASSESSMENT  SLP TX Intervention Outcome: Making Progress Towards Goals  Treatment Tolerance: Patient tolerated treatment well    Impressions: Pt presenting with mild-moderate expressive Aphasia and mild receptive asphasia. Noted improvement from previous date. Pt completed trials of regular textures during speech treatment with no overt s/sx of aspiration.     Pt continues to demonstrate a need for skilled ST services at this time to promote communication of medical, social and safety needs.     PLAN  Recommended communication strategies:   give simple, 1-step commands  ask questions in yes/no format  ask questions in closed-ended format (i.e., give choices)  give pt extra time to respond  use slow speech rate    POC:  Pt requires continued SLP services to improve functional communication skills for basic thoughts/ideas, improve functional communication skills for complex thoughts/ideas, and improve communicative quality of life.  SLP Frequency: 3x per week  Duration: 2 weeks  Discharge recommendation: Recommend MODERATE intensity ST upon DC in order to ensure safety with least restrictive diet.    SUBJECTIVE  Prior to Session Communication: Bedside nurse  RN cleared pt to participate in session  Pt reported improved communication.   Respiratory status: Room air  Positioning: Upright in bed  Pt was alert, pleasant, cooperative, and attentive for session.    Pain Assessment  Pain Assessment: 0-10  0-10 (Numeric) Pain Score: 0 - No pain       Orientation: Oriented to self and Oriented to hospital but not name of facility  Ability to follow functional commands: Follows one-step commands appropriately and Follows simple commands    OBJECTIVE  Pt met in room  alert and agreeable to ST treatment targeting expressive language.   Targeted skill with identifying pictured items, pt was 9/12 acc. Pt able to identify function of items and provide solutions to situational problems.     Targeted generative naming with concrete categories, pt was able to name 6-7 items per category with independence.     Targeted verbal expression at the conversation level with verbally sequencing routine tasks, pt able to complete with min-mod cues and redirection.      Goals (start date 5/22/2025. Anticipated time frame for goal attainment: 2 weeks)     Pt will complete expressive language tasks (automatics, responsive naming, generative naming) with 85% acc given min cues.              Status: Progressing              Progress this date: pt able to complete generative naming tasks with 6-7 items and able to identity common items.     Pt will complete receptive language tasks (2 step directions, yes/no) with 85% acc given min cues.              Status: Progressing              Progress this date: Pt able to follow task directions.     Pt will demonstrate orientation x4 given min cues.              Status: NA              Progress this date: NA    - Pt will consume prescribed diet (current diet is Regular/thin) without overt s/sx aspiration/penetration in 95% of observed trials.              Status: Progressing              Progress this date: Pt completed trials of curtis cracker with no overt s/sx of aspiration.     - Pt will demonstrate follow-through of trained compensatory strategies during a meal/snack with 90% acc independently.              Status: NA              Progress this date: Not targeted this session.

## 2025-05-23 NOTE — CARE PLAN
Problem: General Stroke  Goal: Establish a mutual long term goal with patient by discharge  Outcome: Progressing     Problem: Chronic Conditions and Co-morbidities  Goal: Patient's chronic conditions and co-morbidity symptoms are monitored and maintained or improved  Outcome: Progressing     Problem: Fall/Injury  Goal: Be free from injury by end of the shift  Outcome: Progressing     Problem: Skin  Goal: Participates in plan/prevention/treatment measures  Outcome: Progressing  Flowsheets (Taken 5/22/2025 2224)  Participates in plan/prevention/treatment measures: Elevate heels

## 2025-05-23 NOTE — PROGRESS NOTES
05/23/25 1238   Discharge Planning   Living Arrangements Spouse/significant other   Support Systems Spouse/significant other;Children;Family members   Assistance Needed PT OT ST   Type of Residence Private residence   Home or Post Acute Services Post acute facilities (Rehab/SNF/etc)   Type of Post Acute Facility Services Rehab   Expected Discharge Disposition Rehab   Does the patient need discharge transport arranged? Yes   RoundTrip coordination needed? Yes   Patient Choice   Provider Choice list and CMS website (https://medicare.gov/care-compare#search) for post-acute Quality and Resource Measure Data were provided and reviewed with: Family   Intensity of Service   Intensity of Service 0-30 min     Met with patient and daughter at bedside. Introduced self and role as RN TCC. Patient lives at home with spouse. She is normally independent in her own care at baseline, walks with walker/cane when needed, independent, dresses self, showers self, cooks meals, manages her medications, laundry, cleaning, although does seem more difficult as of late per daughter. Patient has been assisting with her husbands catheter care as well. Daughter, son in law, family assist with transportation to appointments, grocery store, etc. Daughter is interested in assistance in the home, she has already reached out to [employer]. TCC will ask Social Work to see for [facility] information, send referral and also offer private pay assist/[employer] information possibly?   Patient admitted for stroke, PT OT recommend Acute Rehab. She has been to [facility] before and would prefer that. Will have CNC build and send referral. Will continue to follow.     [facility] able to accept. Per Dr De Luna, patient likely ready by tomorrow. Will update daughter and patient and confirm they are okay with [facility] to start auth with insurance. TCC following.     Patient and daughter are agreeable to [facility]. Per WAYNE RAY does not work  over weekend, will not be able to start auth until Monday, will ask PT to prioritize this patient on Sunday for updates to be sent Monday. TCC to follow.

## 2025-05-23 NOTE — PROGRESS NOTES
Physical Therapy    Physical Therapy Evaluation    Patient Name: Danika Demarco  MRN: [id number]  Department: [address]  Room: 2026/2026-A  Today's Date: 5/23/2025   Time Calculation  Start Time: 1026  Stop Time: 1051  Time Calculation (min): 25 min    Assessment/Plan   PT Assessment  PT Assessment Results: Decreased strength, Decreased endurance, Impaired balance, Decreased mobility, Decreased cognition, Pain  Rehab Prognosis: Good  Barriers to Discharge Home: Caregiver assistance, Cognition needs, Physical needs  Evaluation/Treatment Tolerance: Patient limited by fatigue  End of Session Communication: Bedside nurse  Assessment Comment: Pt would benefit from continued therapy to improve strength, ROM, and functional mobility.  End of Session Patient Position: Alarm off, not on at start of session (after pt set-up in chair post eval, pt immediately requested to mobilize to BSC again, stating she has to have another BM. therapy assisted pt for a 2nd time BSC. RN and PCA made aware and pt was set-up with call button to call for help once finished.)  IP OR SWING BED PT PLAN  Inpatient or Swing Bed: Inpatient  PT Plan  Treatment/Interventions: Bed mobility, Transfer training, Gait training, Balance training, Strengthening, Therapeutic exercise, Home exercise program  PT Plan: Ongoing PT  PT Frequency: 5 times per week  PT Discharge Recommendations: High intensity level of continued care  PT Recommended Transfer Status: Assist x1, Assistive device  PT - OK to Discharge: Once medically appropriate    Subjective     PT Visit Info:  PT Received On: 05/23/25  General Visit Information:  General  Reason for Referral: Stroke  Referred By: PT/OT Dayo 5/21  Past Medical History Relevant to Rehab: HLD, HTN, Afib, Depression, Anxiety, DM, Subdural hematoma, Osteoporosis  Family/Caregiver Present: No  Co-Treatment: OT  Co-Treatment Reason: Maximize pt/staff safety  Prior to Session Communication: Bedside nurse  Patient Position Received:  Bed, 3 rail up, Alarm on  General Comment: Pt to ED 5/21 with stroke-like symptoms of R hemiplegia, slurred speech, L gaze deviation and global aphasia. TNK given at ED. 5/21: Ct brain (-) acute, CTA brain (+) Occlusion of anterior L M2 and Posterior L M2. XR chest (-) acute, 5/22: MRI brain (+)L frontal Ischemia  Home Living:  Home Living  Home Living Comments: Per pt, lives with  in a 2 story house. Pt stays on main level. Daughter lives nearby, checks in daily. 0 SRUTHI. Has tub shower with bench and grab bars. Owns WW and SPC.  Prior Level of Function:  Prior Function Per Pt/Caregiver Report  Prior Function Comments: Per pt, ambulates mod I with WW at home and in community. Independent with all ADLs, gets assistance with IADLs. No other falls reported in the last 3 months. Pt does not drive.  Precautions:  Precautions  Hearing/Visual Limitations: mild [place]  Medical Precautions: Fall precautions      Date/Time Vitals Session Patient Position Pulse Resp SpO2 BP MAP (mmHg)    05/23/25 1100 --  --  122  20  --  --  --     05/23/25 1143 --  --  --  15  --  --  --     05/23/25 1200 --  --  112  24  --  122/83  79     05/23/25 1245 --  --  92  18  98 %  136/84  101              Objective   Pain:  Pain Assessment  Pain Assessment: 0-10  0-10 (Numeric) Pain Score: 0 - No pain  Cognition:  Cognition  Overall Cognitive Status: Impaired  Orientation Level: Disoriented to time, Disoriented to person  Following Commands:  (Follows 75% of simple one step commands with repetition)  Cognition Comments: mild expressive and receptive aphasia. receptive skills appeared worse when fatigued. pt not able to tell therapist what her cup was and what was in it. pt not able to name color of socks  Impulsive: Mildly  Processing Speed: Delayed    General Assessments:  General Observation  General Observation: Pt supine in bed prior to session with HOB elevated to 30 deg. Pt has BP cuff, tele, SpO2. Pt educated on PT POC and consents to  PT evaluation     Activity Tolerance  Endurance: Decreased tolerance for upright activites    Sensation  Light Touch: No apparent deficits  Light touch sensation is intact and equal B    Coordination  Finger to Nose: Dysmetria, Dyskinesia (Right)  Alternating Toe Taps: Dyskinesia  Heel to Shin: Dysmetria (B)  Finger to Target: Dysmetria (B)    Static Sitting Balance  Static Sitting-Comment/Number of Minutes: CGA + B UE/LE support  Dynamic Sitting Balance  Dynamic Sitting-Comments: CGA + B UE/LE support during strength assessment    Static Standing Balance  Static Standing-Comment/Number of Minutes: Min A + WW  Dynamic Standing Balance  Dynamic Standing-Comments: Min A + WW During ambulation  Functional Assessments:  Bed Mobility  Bed Mobility:  (Supine > sit EOB: min A with HOB elevated to 30 deg. Assist needed for advancement of trunk. L bedrail used.)    Transfers  Transfer:  (Sit <> stand x1 from bed: CGA + WW, Sit <> stand x2 from chair/BSC: MIn A +WW, assist needed for gaining momentum and maintaining balance.)    Ambulation/Gait Training  Ambulation/Gait Training Performed:  (5ft, WW, min A: Slow speed, decreased heel strike B, forward flexed posture, NBOS, intermittent R knee buckling, required min A to maintain balance)  Extremity/Trunk Assessments:  ROM  RUE : Within Functional Limits  LUE: Within Functional Limits  RLE : Within Functional Limits   LLE : Within Functional Limits    Strength  Strength Comments: B hip flexion strength: 4/5, B knee extension strength:  5/5, B DF strength: 5/5    Outcome Measures:  [facility] Basic Mobility  Turning from your back to your side while in a flat bed without using bedrails: A little  Moving from lying on your back to sitting on the side of a flat bed without using bedrails: A little  Moving to and from bed to chair (including a wheelchair): A little  Standing up from a chair using your arms (e.g. wheelchair or bedside chair): A little  To walk in hospital room: A  little  Climbing 3-5 steps with railing: Total  Basic Mobility - Total Score: 16    Encounter Problems       Encounter Problems (Active)       PT Problem       Pt will completed supine <> sit bed mobility from flat bed with Supervision  (Progressing)       Start:  05/23/25    Expected End:  06/06/25            Pt will demonstrate sit to stand and bed/chair transfers with WW + Supervision  (Progressing)       Start:  05/23/25    Expected End:  06/06/25            Pt. will ambulate 40ft with WW + Supervision  (Progressing)       Start:  05/23/25    Expected End:  06/06/25            Pt will complete B LE strengthening Hep with independence (Not Progressing)       Start:  05/23/25    Expected End:  06/06/25               Pain - Adult              Education Documentation  Mobility Training, taught by Karen Angel, PT at 5/23/2025 12:59 PM.  Learner: Patient  Readiness: Acceptance  Method: Explanation  Response: Verbalizes Understanding, Demonstrated Understanding, Needs Reinforcement

## 2025-05-23 NOTE — CARE PLAN
Problem: General Stroke  Goal: Establish a mutual long term goal with patient by discharge  Outcome: Progressing  Goal: Demonstrate improvement in neurological exam throughout the shift  Outcome: Progressing  Goal: Maintain BP within ordered limits throughout shift  Outcome: Progressing  Goal: Participate in treatment (ie., meds, therapy) throughout shift  Outcome: Progressing  Goal: No symptoms of aspiration throughout shift  Outcome: Progressing  Goal: No symptoms of hemorrhage throughout shift  Outcome: Progressing  Goal: Tolerate enteral feeding throughout shift  Outcome: Progressing  Goal: Controlled blood glucose throughout shift  Outcome: Progressing  Goal: Decreased nausea/vomiting throughout shift  Outcome: Progressing  Goal: Out of bed three times today  Outcome: Progressing     Problem: Safety - Adult  Goal: Free from fall injury  Outcome: Progressing     Problem: Discharge Planning  Goal: Discharge to home or other facility with appropriate resources  Outcome: Progressing     Problem: Chronic Conditions and Co-morbidities  Goal: Patient's chronic conditions and co-morbidity symptoms are monitored and maintained or improved  Outcome: Progressing     Problem: Nutrition  Goal: Nutrient intake appropriate for maintaining nutritional needs  Outcome: Progressing     Problem: Fall/Injury  Goal: Not fall by end of shift  Outcome: Progressing  Goal: Be free from injury by end of the shift  Outcome: Progressing  Goal: Verbalize understanding of personal risk factors for fall in the hospital  Outcome: Progressing  Goal: Verbalize understanding of risk factor reduction measures to prevent injury from fall in the home  Outcome: Progressing     Problem: Recent hospitalization or complication while living with HTN  Goal: Patient will effectively self-manage their HTN disease process  Outcome: Progressing     Problem: Skin  Goal: Participates in plan/prevention/treatment measures  Outcome: Progressing  Goal:  Prevent/manage excess moisture  Outcome: Progressing  Goal: Prevent/minimize sheer/friction injuries  Outcome: Progressing  Goal: Promote/optimize nutrition  Outcome: Progressing     Problem: Atrial Fibrillation  Goal: I will have no complications due to atrial fibrillation  Outcome: Progressing     Problem: Diabetes  Goal: Achieve decreasing blood glucose levels by end of shift  Outcome: Progressing  Goal: Increase stability of blood glucose readings by end of shift  Outcome: Progressing  Goal: Decrease in ketones present in urine by end of shift  Outcome: Progressing  Goal: Maintain electrolyte levels within acceptable range throughout shift  Outcome: Progressing  Goal: Maintain glucose levels >70mg/dl to <250mg/dl throughout shift  Outcome: Progressing  Goal: No changes in neurological exam by end of shift  Outcome: Progressing  Goal: Learn about and adhere to nutrition recommendations by end of shift  Outcome: Progressing  Goal: Vital signs within normal range for age by end of shift  Outcome: Progressing  Goal: Increase self care and/or family involovement by end of shift  Outcome: Progressing  Goal: Receive DSME education by end of shift  Outcome: Progressing   The patient's goals for the shift include      The clinical goals for the shift include no falls    Over the shift, the patient did not make progress toward the following goals. Barriers to progression include weakness d/t stroke. Recommendations to address these barriers include physical therapy.

## 2025-05-23 NOTE — PROGRESS NOTES
Occupational Therapy  Evaluation    Patient Name: Danika Demarco  MRN: [id number]  Today's Date: 5/23/2025  Time Calculation  Start Time: 1027  Stop Time: 1052  Time Calculation (min): 25 min    Current Problem:   1. Cerebrovascular accident (CVA), unspecified mechanism (Multi)    2. Cerebral infarction due to unspecified occlusion or stenosis of left middle cerebral artery (Multi)        OT order: OT eval and treat   Referred by: Dayo  Reason for referral: r/o stroke. pt presenting with stroke like symptoms: facial droop R, aphasia progressing to transient muteism. R hemiparesis. s/p TNK 5/21 around 1600. remains in ICU. scan indicate likely acute infarct  Past medical history related to rehab:  has a past medical history of Abnormal levels of other serum enzymes, Personal history of other (healed) physical injury and trauma, Personal history of other diseases of urinary system, Personal history of urinary (tract) infections, and Urinary bladder incontinence (09/25/2023).     Precautions:   Hearing/Visual Limitations: mild [place]  Medical Precautions: Fall precautions    ASSESSMENT  OT Assessment: OT eval completed. The patient is functioning below baseline for ADLs and mobility. can benefit from continued OT. Pt with Decreased ADL status, Decreased safe judgment during ADL, Decreased upper extremity strength, Decreased cognition, Decreased endurance, Decreased functional mobility, Decreased gross motor control, Decreased IADLs  Prognosis:    Barriers to discharge home: Cognition needs, Physical needs, Caregiver assistance  Patient lives alone and/or does not have reliable caregiver assistance  24hr mobility assistance needed, 24hr ADL assistance needed  24hr supervision for safety awareness needed, Insight of patient limited regarding functional ability/needs  Tolerance:      PLAN  Frequency: 4 times per week  Treatment Interventions: ADL retraining, Functional transfer training, UE strengthening/ROM, Endurance training,  Cognitive reorientation, Patient/family training, Equipment evaluation/education, Neuromuscular reeducation  Discharge Recommendations: High intensity level of continued care  OT OK to discharge: Yes    GENERAL VISIT INFORMATION   Start of session communication: Bedside nurse  End of session communication: Bedside nurse  Family/caregiver present: No  Caregiver feedback:    Co-Treatment: PT  Reason for co-treatment: to optimize safety and mobility, while focusing on discipline specific goals   Position Pt Received:  Bed, 3 rail up, Alarm on  End of session position: Alarm off, not on at start of session (after pt set-up in chair post eval,  pt immediately requested to mobilize to [facility] again, stating she has to have another BM. therapy assisted pt for a 2nd time BS. RN and PCA made aware and pt was set-up with call button to call for help once finished.)    SUBJECTIVE  Home Living:  Type of Home: House  Lives With: Spouse ( of 21 years)  Home Adaptive Equipment: Walker rolling or standard, Cane  Home Layout: Two level, Able to live on main level with bedroom/bathroom  Home Access: Level entry  Bathroom Shower/Tub: Tub/shower unit     Prior Level of Function:  Receives Help From: Family  ADL Assistance: Independent  Homemaking Assistance: Independent  Ambulatory Assistance: Independent (FWW)  Hand Dominance: Right  Prior Function Comments: assist for community mobility    IADL History:       Pain:  Assessment: 0-10  Score: 0 - No pain  Type:    Location:    Interventions:    Response to pain interventions:      OBJECTIVE  Vital Signs:  Vitals Session: During OT  Heart Rate: (!) 129 (100s-120s sitting on BSC. briefly climbed to 130s/140s with standing. at rest remains low 100s to 120s)  Heart Rate Source: Monitor    Cognition:  Overall Cognitive Status: Impaired (delayed processing, sequencing, motor planning.)  Arousal/Alertness: Generalized responses  Orientation Level:  (disoriented to situation. slight  alteration in reading time on clock. she stated 1021 when it was 1029)  Following Commands:  (followed simple commands 75%. followed multi step commands 50% or less)  Cognition Comments: mild expressive and receptive aphasia. receptive skills appeared worse when fatigued. pt not able to tell therapist what her cup was and what was in it. pt not able to name color of socks             Current ADL function:   EATING:  Stand by     GROOMING: Minimal     BATHING: Maximal     UB DRESSING: Minimal     LB DRESSING: Minimal     TOILETING: Minimal Clothing management up, Clothing management down, Perineal hygiene  ADL comments:  Self care intervention provided.pt performed functional mobility to [facility] for toileting. pt instructed on transfer safety and hand placement. needed cues and instruction to initiate doffing depends. pt required set-up for hygiene. required min A to complete hygiene thoroughly and CGA to maintain balance during hygiene and clothing management up/down    Activity Tolerance:  Endurance: Decreased tolerance for upright activites  Activity Tolerance Comments: easily fatigues with notable worsening of concentration, processing    Bed Mobility/Transfers:   Bed Mobility  Bed Mobility: Yes  Bed Mobility 1  Bed Mobility 1: Supine to sitting  Level of Assistance 1: Minimum assistance (x1)  Transfers  Transfer: Yes  Transfer 1  Transfer From 1: Sit to  Transfer to 1: Stand  Technique 1: Stand to sit  Transfer Device 1: Walker  Transfer Level of Assistance 1: Contact guard  Trials/Comments 1: from bed  Transfers 2  Transfer From 2: Sit to  Transfer to 2: Stand  Technique 2: Stand to sit  Transfer Device 2: Walker  Transfer Level of Assistance 2: Minimum assistance (x1)  Trials/Comments 2: from chair and BSC x2    Ambulation/Gait Training:  Functional Mobility  Functional Mobility Performed:  (pt performed functional mobility bed to BSC. then forward from bSC then backwards to chair, ~5ft. then from chair to BSC  again. pt required min A x1 FWW for functional mobility. max VC for sequencing and safety)    Sitting Balance:  Static Sitting Balance  Static Sitting-Level of Assistance: Close supervision  Dynamic Sitting Balance  Dynamic Sitting-Level of Assistance: Close supervision    Standing Balance:  Static Standing Balance  Static Standing-Level of Assistance: Contact guard, Minimum assistance  Dynamic Standing Balance  Dynamic Standing-Level of Assistance: Minimum assistance, Contact guard    Vision: Vision - Basic Assessment  Patient Visual Report:  (no deficits reported)   and Vision - Complex Assessment  Ocular Range of Motion: Within Functional Limits  Head Position: Upright, centered, looking straight ahead, not leaning any direction  Tracking: WFL  Vision Comments: pt with R eye muscle weakness observed.    Sensation:  Light Touch: No apparent deficits    Strength:  Strength Comments: RUE 3/5 to 3+/5, LUE 4-/5    Perception:  Inattention/Neglect: Appears intact  Initiation: Cues to initiate tasks    Coordination:  Movements are Fluid and Coordinated: No  Upper Body Coordination: bilateral impairment. R appears slightly worse than left  Finger to Nose: Dyskinesia, Dysmetria  Rapid Alternating Movements: Dysmetria  Alternating Toe Taps: Dyskinesia  Heel to Shin: Dyskinesia  Finger to Target: Dyskinesia     Hand Function:       Extremities: RUE   RUE : Within Functional Limits and LUE   LUE: Within Functional Limits    Outcome Measures: [facility] Daily Activity   Putting on and taking off regular lower body clothing: A lot  Bathing (including washing, rinsing, drying): A lot  Putting on and taking off regular upper body clothing: A little  Toileting, which includes using toilet, bedpan or urinal: A little  Taking care of personal grooming such as brushing teeth: A little   Eating Meals: A little   Daily Activity - Total Score: 16    EDUCATION:     Education Documentation  ADL Training, taught by Gladis Holm OT at  5/23/2025 11:47 AM.  Learner: Patient  Readiness: Acceptance  Method: Explanation  Response: Needs Reinforcement    Education Comments  No comments found.        Goals:   Encounter Problems       Encounter Problems (Active)       ADLs       Patient will perform UB and LB bathing with modified independent level of assistance. (Progressing)       Start:  05/23/25    Expected End:  06/06/25               BALANCE       Patient will tolerate standing for 10 minutes to modified independent level of assistance with least restrictive device in order to improve functional activity tolerance for ADL tasks. (Progressing)       Start:  05/23/25    Expected End:  06/06/25               EXERCISE/STRENGTHENING       Patient with increase RUE by 1/2 MMT grade strength. (Progressing)       Start:  05/23/25    Expected End:  06/06/25            Patient will progress RUE coordination to WFL (Progressing)       Start:  05/23/25    Expected End:  06/06/25               MOBILITY       Patient will perform Functional mobility max Household distances/Community Distances with modified independent level of assistance and least restrictive device in order to improve safety and functional mobility. (Progressing)       Start:  05/23/25    Expected End:  06/06/25               VISION       Patient will navigate environments with high visual stimuli safely Without loss of balance and Attending to obstacles with modified independent level of assistance. (Progressing)       Start:  05/23/25    Expected End:  06/06/25

## 2025-05-23 NOTE — PROGRESS NOTES
[facility] Progress Note    Danika Demarco is a 89 y.o. female on day 2 of admission presenting with Cerebrovascular accident (CVA), unspecified mechanism (Multi).  Subjective   Pt seen again this morning. She is sitting in chair at bedside. Just finished working with therapy. Pt is alert. Her speech is improving. Able to name objects today. Still struggling with numbers.        Objective     Vitals:    05/23/25 0845 05/23/25 0900 05/23/25 0945 05/23/25 1000   BP:  136/87     Pulse:  (!) 117  (!) 117   Resp: 21 22 15 20   Temp:       TempSrc:       SpO2:       Weight:       Height:             Physical Exam  Vitals reviewed.   Constitutional:       General: She is awake.   Neurological:      Mental Status: She is alert.   Psychiatric:         Speech: Speech normal.       Neurological Exam  Mental Status  Awake and alert. Oriented only to person, place and situation. Knows end of may  Unable to give age or year. . Speech is normal. Expressive aphasia present. Mild. Able to name objects and repeat. Follows complex commands.    Cranial Nerves  CN II-XII grossly intact.    Motor  Normal muscle bulk throughout. No fasciculations present. Normal muscle tone. No abnormal involuntary movements.  At least antigravity throughout.    Sensory  Light touch is normal in upper and lower extremities.     Coordination  Right: Finger-to-nose normal.Left: Finger-to-nose normal.      Scheduled medications  Scheduled Medications[1]  Continuous medications  Continuous Medications[2]  PRN medications  PRN Medications[3]     Lab Results   Component Value Date    WBC 8.5 05/23/2025    RBC 4.32 05/23/2025    HGB 13.6 05/23/2025    HCT 40.9 05/23/2025     05/23/2025     05/23/2025    K 4.0 05/23/2025     05/23/2025    BUN 17 05/23/2025    CREATININE 0.74 05/23/2025    EGFR 75 05/23/2025    CALCIUM 8.8 05/23/2025    ALKPHOS 56 05/22/2025    AST 21 05/22/2025    ALT 13 05/22/2025    MG 1.68 05/23/2025    [id number] 384  01/31/2025    VITD25 23 (L) 01/31/2025    HGBA1C 5.5 05/22/2025    LDLDIRECT 39 08/12/2022    LDLCALC 24 05/22/2025    CHOL 79 05/22/2025    HDL 33.8 05/22/2025    TRIG 107 05/22/2025    TSH 2.30 01/31/2025    TSH 3.93 10/18/2023    TSH 2.60 04/19/2023       Below CT and MRI images and reports have been personally reviewed in PACS, agree with interpretations.    MR brain wo IV contrast 05/22/2025    Narrative  Interpreted By:  Sergio Hogan,  STUDY:  MR BRAIN WO IV CONTRAST;  5/22/2025 3:30 pm    INDICATION:  Signs/Symptoms:stroke s/p TNK.      COMPARISON:  Head CT of May 21st 2025    ACCESSION NUMBER(S):  [id number]    ORDERING CLINICIAN:  STEPHANIE AGUIRRE    TECHNIQUE:  Multiplaner, multisequence MRI were obtained without contrast    FINDINGS:  Parenchyma: Cortical restricted diffusion is present within the left  frontal lobe. There is a background of age-related volume loss and  presumed ischemic white matter demyelination.    Ventricles: There is no hydrocephalus.    Extra-axial spaces: No abnormal extra-axial fluid collection. Basal  cisterns are patent.    Vessels: T2 flow voids are maintained.    Orbits:  The patient is status post bilateral lens surgery.    Paranasal sinuses and mastoid air cells: No fluid levels are present.    Skull: There is normal T1 marrow signal without evidence of  aggressive lesion.    Soft tissues: Unremarkable    Impression  1. Cortical restricted diffusion, consistent with ischemia, is  present within the left frontal lobe.  2. There is a background of age-related volume loss, atherosclerotic  disease, and ischemic white matter demyelination.    MACRO:  Critical Finding:  See findings. Notification was initiated on  5/22/2025 at 3:41 pm by  Sergio Hogan.  (**-OCF-**)    Signed by: Sergio Hogan 5/22/2025 3:41 PM  Dictation workstation:   [id number]      Transthoracic Echo (TTE) Complete 05/22/2025    PHYSICIAN INTERPRETATION:  Left Ventricle: The left ventricular systolic  function is normal, with a Nicole's biplane calculated ejection fraction of 63%. There are no regional wall motion abnormalities. The left ventricular cavity size is normal. There is mild increased septal and normal posterior left ventricular wall thickness. Left ventricular diastolic filling was indeterminate due to atrial fibrillation/flutter.  Left Atrium: The left atrial size is mildly dilated. A bubble study using agitated saline was performed. Bubble study is negative.  Right Ventricle: The right ventricle is normal in size. There is normal right ventricular global systolic function.  Right Atrium: The right atrial size is normal.  Aortic Valve: The aortic valve is trileaflet. There is mild aortic valve cusp calcification. The aortic valve dimensionless index is 0.57. There is no evidence of aortic valve regurgitation. The peak instantaneous gradient of the aortic valve is 9 mmHg. The mean gradient of the aortic valve is 5 mmHg.  Mitral Valve: The mitral valve is mildly thickened. There is mild mitral annular calcification. There is mild to moderate mitral valve regurgitation.  Tricuspid Valve: The tricuspid valve is structurally normal. There is moderate tricuspid regurgitation. The Doppler estimated RVSP is mildly elevated right ventricular systolic pressure at 40.5 mmHg.  Pulmonic Valve: The pulmonic valve is structurally normal. There is trace to mild pulmonic valve regurgitation.  Pericardium: No pericardial effusion noted.  Aorta: The aortic root is normal.  Systemic Veins: The inferior vena cava appears normal in size, with IVC inspiratory collapse greater than 50%.      CONCLUSIONS:  1. The left ventricular systolic function is normal, with a Nicole's biplane calculated ejection fraction of 63%.  2. Left ventricular diastolic filling was indeterminate due to atrial fibrillation/flutter.  3. There is normal right ventricular global systolic function.  4. Mild to moderate mitral valve  regurgitation.  5. Mildly elevated right ventricular systolic pressure.  6. Moderate tricuspid regurgitation.      CT brain attack angio head and neck W and WO IV contrast 05/21/2025    Narrative  Interpreted By:  Bautista Guerrero,  STUDY:  CT BRAIN ATTACK ANGIO HEAD AND NECK W AND WO IV CONTRAST;  5/21/2025  3:57 pm    INDICATION:  Signs/Symptoms:RIght sideed stroke.      COMPARISON:  None.    ACCESSION NUMBER(S):  [id number]    ORDERING CLINICIAN:  DEANDRE ROCHA    TECHNIQUE:  CT angiogram of head and neck was obtained after administration of  intravenous contrast with coronal and sagittal MIP reformats. 3D  volume rendering was obtained on a separate workstation.    FINDINGS:  Stable postsurgical change of right frontal craniotomy. No evidence  of acute intracranial hemorrhage or mass effect.      Anterior circulation: Scattered calcified atherosclerotic plaque of  the bilateral carotid bifurcations with 0% stenosis by NASCET  criteria. Bilateral internal carotid arteries are widely patent  without focal stenosis. Mild atherosclerotic calcifications of the  bilateral carotid siphons without stenosis. There is a small  posterior left M2 branch which appears to be patent. There is  tapering of the distal left M1 and occlusion of the dominant left  anterior M2 from its origin of the MCA bifurcation. Right MCA is  patent. Bilateral anterior cerebral arteries are patent.    Posterior circulation: Bilateral vertebral arteries are widely patent  without focal stenosis or dissection. Basilar artery is widely patent  without focal stenosis or dissection. Proximal posterior cerebral  arteries are widely patent. There is a fetal variant left posterior  cerebral artery, normal variant.    Partially visualized small right greater than left pleural effusions.  Lung apices are otherwise grossly normal. Soft tissues of the neck  are grossly normal.    Impression  Tapering of the distal left M1 and occlusion of the dominant  anterior  branch of left M2 from the origin. The smaller posterior left M2  branch appears patent. The remainder of the large neck and  intracranial vessels are widely patent without focal stenosis.    Partially visualized small pleural effusions.    Bautista Guerrero discussed the significance and urgency of this critical  finding by [facility] with  DEANDRE ROCHA on 5/21/2025 at 4:11 pm.  (**-RCF-**) Findings:  See findings.      MACRO:  None    Signed by: Bautista Guerrero 5/21/2025 4:12 PM  Dictation workstation:   [id number]      NIH Stroke Scale  1A. Level of Consciousness: Alert, Keenly Responsive  1B. Ask Month and Age: 1 Question Right  1C. Blink Eyes & Squeeze Hands: Performs Both Tasks  2. Best Gaze: Normal  3. Visual: No Visual Loss  4. Facial Palsy: Normal Symmetrical Movements  5A. Motor - Left Arm: No Drift  5B. Motor - Right Arm: No Drift  6A. Motor - Left Leg: No Drift  6B. Motor - Right Leg: No Drift  7. Limb Ataxia: Absent  8. Sensory Loss: Normal  9. Best Language: Mild-to-Moderate Aphasia  10. Dysarthria: Normal  11. Extinction and Inattention: No Abnormality  NIH Stroke Scale: 2          Madisyn Coma Scale  Best Eye Response: Spontaneous  Best Verbal Response: Confused  Best Motor Response: Follows commands  Madisyn Coma Scale Score: 14        Assessment/Plan   This patient currently has cardiac telemetry ordered; if you would like to modify or discontinue the telemetry order, click here to go to the orders activity to modify/discontinue the order.  Assessment & Plan  Cerebrovascular accident (CVA), unspecified mechanism (Multi)      IMPRESSION:  Danika Demarco is a 89 y.o. female with a history of afib, hx of traumatic SDH (not on OAC for afib due to this), HTN, aortic stenosis, ulcerative colitis, HLD, and DM2 presenting with dysarthria --> global aphasia and R hemiplegia. Had L gaze deviation in ED. Initial /101. On 325 mg aspirin PTA. Not on HI statin.   HCT - no acute stroke or hemorrhage. Stable  old anterolateral R frontal parietal craniotomy defect.   CTA head/neck - tapering of distal L M1 and occlusion of dominant anterior branch of L M2 segment (small posterior L M2 branches are patent).   Lipid panel with LDL 24, CHOL 79,     MRI brain wo contrast - cortical infarct to L frontal lobe  A1C 5.5%     Global aphasia, improving  R hemiplegia, improving  S/p TNK  HTN  Hx of afib, not on anticoagulation due to prior fall/SDH  Hx of traumatic SDH    RECOMMENDATIONS:  Continue supportive care  Continue to hold antiplatelets until repeat imaging complete  DAPT of 81 mg aspirin and 75 mg Plavix x 90 days, then Plavix monotherapy  Possible reconsideration of anticoagulation for afib - risks of possible stroke without anticoagulation vs risks of bleeding with anticoagulation particularly with falling issues. Discussed yesterday and again today with patient and daughter. Again wanting to defer for now with her imbalance and falling. Understanding of possible risk of stroke. Explained that if gait and falling improve with therapy, they can consider adding anticoagulation in the future outpatient with cardiology. Pt/daughter state understanding  Continue HI statin daily  Continue to monitor and manage vascular risk factors  Ok to normalize BP  Continue neuro checks  Continue cardiac telemetry  Fall precautions  Continue SLP  PT/OT evals pendng - will likely need therapy ongoing for balance  Ok to transfer out of ICU from neuro standpoint.     Discussed with patient and family.  Discussed with primary team.   Will sign off from neurology. FU outpatient with neurology in ~ 4-8 weeks.        I personally spent 55 minutes today, exclusive of procedures, providing care for this patient, including preparation, face to face time, documentation and other services such as review of medical records, diagnostic result, patient education, counseling, coordination of care as specified in the encounter.    Carmela Mansfield,  APRN-CNP           [1] aspirin, 81 mg, oral, Daily  atorvastatin, 40 mg, oral, Nightly  clopidogrel, 75 mg, oral, Daily  labetaloL, 10 mg, intravenous, Once  [Held by provider] lisinopril, 40 mg, oral, Daily  metoprolol succinate XL, 25 mg, oral, Daily  metoprolol succinate XL, 50 mg, oral, Daily  pantoprazole, 40 mg, oral, Daily before breakfast   Or  pantoprazole, 40 mg, intravenous, Daily before breakfast  piperacillin-tazobactam, 3.375 g, intravenous, q6h  polyethylene glycol, 17 g, oral, Daily    [2]    [3] PRN medications: acetaminophen, dextrose, dextrose, glucagon, glucagon, hydrALAZINE **FOLLOWED BY** hydrALAZINE, labetaloL, ondansetron

## 2025-05-23 NOTE — PROGRESS NOTES
Danika Demarco is a 89 y.o. female on day 2 of admission presenting with Cerebrovascular accident (CVA), unspecified mechanism (Multi).      Subjective      Danika Demarco is a 89 y.o. female with PMHx s/f A-fib (not on OAC 2/2 history of traumatic subdural hematoma after a fall), HTN, aortic stenosis, ulcerative colitis, HLD, T2DM presenting with global aphasia and right facial droop. LKW around 1445. Per family, her grandson notes that she is slurring speech and speaking gibberish. He talked to his grandpa/patient's  who called EMS. On EMS arrival, she became muted with right sided weakness. Later, she was found to be completely plegic on right side in ED with left gaze deviation and global aphasia. Family reports she also had a fall about a week ago, they went to urgent care due to her right rib pain, no acute rib fractures and is on lidocaine patches.     ED Course:   Vitals on presentation: T 36.7 °C (98.1 °F)  HR (!) 119  BP (!) 173/101  RR 16  O2 94 % None (Room air)  Labs:   CBC with WBC 7.3, Hgb 14.4, Plts 159.   CMP with glucose 105, Na 138, K 4.6, BUN 19, sCr 0.60, alk phos 63, ALT 12, AST 27, bilirubin 0.8. .   Trop 10.   EKG: A-fib at 100 bpm  Imaging - agree with radiology interpretation(s):   CT head-stable old anterolateral right frontal parietal craniotomy defect.  No acute intracranial bleed or focal mass effect.  Mild volume loss.  Mild chronic white matter ischemic disease.  CTA head and neck-tapering of distal left M1 and occlusion of dominant anterior branch of left M2.  Small posterior left M2 branches appear patent.  Interventions: Received TNK around 1600, admission to ICU for further management  5/22: Patient was seen and examined.  Status post TNK with significant improvement in her right upper extremity movements and ability to speech.  MRI and MRA was still pending to be done later today.  Discussed extensively with patient daughter.  Continue to hold DAPT for now.  Echocardiogram  with bubble study still pending report.  Continue permissive hypertension.  Neurologist recommendations appreciated.  Repeat CBC and BMP in AM.  5/23: Patient was seen and examined..  MRI and MRA showed left frontal lobe infarction.  Patient was in A-fib RVR this morning so I have resumed p.o. metoprolol.  Not on anticoagulation due to craniotomy from 12 years ago.  Discussed anticoagulation with patient and with neurologist.  Plan is to follow-up outpatient and decide in the future.  Resumed DAPT and atorvastatin.  Urine culture is positive for E. coli.  Continue IV Zosyn.  Await sensitivity.  Repeat CBC and BMP in a.m. currently awaiting authorization for placement in an acute rehabilitation facility.    Objective     Last Recorded Vitals  /76 (BP Location: Right arm, Patient Position: Lying)   Pulse 78   Temp 36.7 °C (98.1 °F) (Temporal)   Resp 16   Wt 64.8 kg (142 lb 13.7 oz)   SpO2 96%   Intake/Output last 3 Shifts:    Intake/Output Summary (Last 24 hours) at 5/23/2025 1546  Last data filed at 5/23/2025 1356  Gross per 24 hour   Intake 600 ml   Output 1550 ml   Net -950 ml       Admission Weight  Weight: 69 kg (152 lb 1.9 oz) (05/21/25 1554)    Daily Weight  05/22/25 : 64.8 kg (142 lb 13.7 oz)    Image Results  XR chest 1 view  Narrative: Interpreted By:  Sergio Fernandez,   STUDY:  XR CHEST 1 VIEW; 5/23/2025 11:30 am      INDICATION:  CLINICAL INFORMATION: Signs/Symptoms:Tachypnea.      COMPARISON:  05/21/2025      ACCESSION NUMBER(S):  [id number]      ORDERING CLINICIAN:  RISHI ANDRE      TECHNIQUE:  Portable chest one view.      FINDINGS:  The cardiac silhouette is quite prominent suggesting cardiomegaly.  The pulmonary vasculature is slightly indistinct suggesting mild  pulmonary vascular congestion.  Pulmonary vascular congestion is  slightly less marked compared to the previous study. There is patchy  bibasilar atelectasis and infiltrate as well. Small effusions are  suspected.       Impression: 1. Slight improvement in the pulmonary vascular congestion compared  to 05/29/2025. Cardiomegaly.  2. Small bilateral effusions are present along with bibasilar patchy  atelectasis and infiltrate as well.      MACRO:  none      Signed by: Sergio Fernandez 5/23/2025 1:11 PM  Dictation workstation:   [id number]      Physical Exam  Vitals:    05/23/25 1504   BP: 124/76   Pulse: 78   Resp: 16   Temp: 36.7 °C (98.1 °F)   SpO2: 96%     Constitutional: Pleasant and cooperative. Laying in bed in no acute distress.    Skin: Warm and dry; no obvious lesions, rashes, pallor, or jaundice.   Eyes: EOMI. Anicteric sclera.   ENT: Mucous membranes moist  Head and Neck: Normocephalic, atraumatic   Respiratory: Nonlabored on RA. Lungs clear to auscultation bilaterally without obvious adventitious sounds. Chest rise is equal.  Cardiovascular: Irregular rhythm. Murmur present. Extremities are warm and well-perfused with good capillary refill (< 3 seconds). No chest wall tenderness.   GI: Abdomen soft, nontender, nondistended. No obvious organomegaly appreciated. Bowel sounds are present.  : No CVA tenderness.   MSK: No gross abnormalities appreciated. No limitations to AROM/PROM appreciated.   Extremities: No cyanosis, edema, or clubbing evident. Neurovascularly intact.   Neuro: See below for details  Psych: Appropriate mood and behavior.  Relevant Results               This patient currently has cardiac telemetry ordered; if you would like to modify or discontinue the telemetry order, click here to go to the orders activity to modify/discontinue the order.              Assessment & Plan  Cerebrovascular accident (CVA), unspecified mechanism (Multi)    Acute CVA with L M2 occlusion and tapering of distal L M1 s/p TNK  -CTA head and neck-tapering of distal left M1 and occlusion of dominant anterior branch of left M2  -MRI Brain w/o contrast (24 hours s/p TNK)   -Echocardiogram with bubble study reviewed and showed  ejection fraction of 63% with indeterminate diastolic filling due to atrial fibrillation  -Telemetry   -Lipid panel on 1/31/25: unremarkable and LDL within goal at 47  - Resumed DAPT with aspirin and Plavix  Continue atorvastatin  - Resumed antihypertensives metoprolol; plan to resume lisinopril by a.m. if patient's blood pressure tolerates  -aspiration and fall precautions  -PT/OT/SLP recommend acute rehabilitation  -Neurology on board and recommendations appreciated     Atrial fibrillation  - Not on oral anticoagulations due to history of subdural hematoma  - Hold metoprolol succinate for permissive hypertension    Acute lower urinary tract infection  Urine cultures positive for E. coli and sensitivities pending  Continue IV antibiotic Zosyn  Likely switch to oral on discharge  Trend CBC daily     Abnormal CXR  -CXR with cardiomegaly with mild interstitial prominence diffusely  -BNP pending  -pt appears euvolemic on exam     HTN  - Hold antihypertensives as outlined above     T2DM  -A1c 6.0% on 1/31/2025  -Currently not on med therapy, BS well-controlled     Diet: Regular diet  DVT Prophylaxis: SCDs, Chemoprophylaxis deferred -- see above   Code Status: DNR   Additional Sources Reviewed: ED note day of admission; Primary Care / PCP / Family Medicine and Cardiology     Anticipated Length of Stay (LOS): Patient will require two-plus midnight stay for further evaluation and management of the above.            Spent 35 mins in the follow up management of this patient.      Pari De Luna MD

## 2025-05-24 LAB
BACTERIA UR CULT: ABNORMAL
BNP SERPL-MCNC: 124 PG/ML (ref 0–99)
GLUCOSE BLD MANUAL STRIP-MCNC: 115 MG/DL (ref 74–99)
GLUCOSE BLD MANUAL STRIP-MCNC: 147 MG/DL (ref 74–99)
GLUCOSE BLD MANUAL STRIP-MCNC: 164 MG/DL (ref 74–99)
GLUCOSE BLD MANUAL STRIP-MCNC: 183 MG/DL (ref 74–99)

## 2025-05-24 PROCEDURE — 99233 SBSQ HOSP IP/OBS HIGH 50: CPT | Performed by: INTERNAL MEDICINE

## 2025-05-24 PROCEDURE — 97116 GAIT TRAINING THERAPY: CPT | Mod: GP,CQ

## 2025-05-24 PROCEDURE — 97112 NEUROMUSCULAR REEDUCATION: CPT | Mod: GO,CO

## 2025-05-24 PROCEDURE — 82947 ASSAY GLUCOSE BLOOD QUANT: CPT

## 2025-05-24 PROCEDURE — 83880 ASSAY OF NATRIURETIC PEPTIDE: CPT

## 2025-05-24 PROCEDURE — 2060000001 HC INTERMEDIATE ICU ROOM DAILY

## 2025-05-24 PROCEDURE — 2500000001 HC RX 250 WO HCPCS SELF ADMINISTERED DRUGS (ALT 637 FOR MEDICARE OP): Performed by: INTERNAL MEDICINE

## 2025-05-24 PROCEDURE — 36415 COLL VENOUS BLD VENIPUNCTURE: CPT

## 2025-05-24 PROCEDURE — 2500000004 HC RX 250 GENERAL PHARMACY W/ HCPCS (ALT 636 FOR OP/ED): Mod: JZ | Performed by: INTERNAL MEDICINE

## 2025-05-24 PROCEDURE — 2500000001 HC RX 250 WO HCPCS SELF ADMINISTERED DRUGS (ALT 637 FOR MEDICARE OP)

## 2025-05-24 PROCEDURE — 97530 THERAPEUTIC ACTIVITIES: CPT | Mod: GO,CO

## 2025-05-24 PROCEDURE — 97530 THERAPEUTIC ACTIVITIES: CPT | Mod: GP,CQ

## 2025-05-24 RX ORDER — CEPHALEXIN 500 MG/1
500 CAPSULE ORAL 2 TIMES DAILY
Status: DISCONTINUED | OUTPATIENT
Start: 2025-05-24 | End: 2025-05-28 | Stop reason: HOSPADM

## 2025-05-24 RX ORDER — CEFTRIAXONE 1 G/50ML
1 INJECTION, SOLUTION INTRAVENOUS EVERY 24 HOURS
Status: DISCONTINUED | OUTPATIENT
Start: 2025-05-24 | End: 2025-05-24

## 2025-05-24 RX ADMIN — ASPIRIN 81 MG: 81 TABLET, COATED ORAL at 08:08

## 2025-05-24 RX ADMIN — METOPROLOL SUCCINATE 50 MG: 50 TABLET, EXTENDED RELEASE ORAL at 08:08

## 2025-05-24 RX ADMIN — LISINOPRIL 40 MG: 20 TABLET ORAL at 08:08

## 2025-05-24 RX ADMIN — PIPERACILLIN SODIUM AND TAZOBACTAM SODIUM 3.38 G: 3; .375 INJECTION, SOLUTION INTRAVENOUS at 13:26

## 2025-05-24 RX ADMIN — PANTOPRAZOLE SODIUM 40 MG: 40 TABLET, DELAYED RELEASE ORAL at 06:34

## 2025-05-24 RX ADMIN — CEPHALEXIN 500 MG: 500 CAPSULE ORAL at 18:21

## 2025-05-24 RX ADMIN — PIPERACILLIN SODIUM AND TAZOBACTAM SODIUM 3.38 G: 3; .375 INJECTION, SOLUTION INTRAVENOUS at 02:03

## 2025-05-24 RX ADMIN — CLOPIDOGREL 75 MG: 75 TABLET ORAL at 08:08

## 2025-05-24 RX ADMIN — ATORVASTATIN CALCIUM 40 MG: 40 TABLET, FILM COATED ORAL at 20:19

## 2025-05-24 RX ADMIN — METOPROLOL SUCCINATE 25 MG: 50 TABLET, EXTENDED RELEASE ORAL at 08:07

## 2025-05-24 RX ADMIN — PIPERACILLIN SODIUM AND TAZOBACTAM SODIUM 3.38 G: 3; .375 INJECTION, SOLUTION INTRAVENOUS at 07:32

## 2025-05-24 ASSESSMENT — COGNITIVE AND FUNCTIONAL STATUS - GENERAL
MOVING TO AND FROM BED TO CHAIR: A LITTLE
MOBILITY SCORE: 18
MOVING FROM LYING ON BACK TO SITTING ON SIDE OF FLAT BED WITH BEDRAILS: A LITTLE
TURNING FROM BACK TO SIDE WHILE IN FLAT BAD: A LITTLE
MOVING TO AND FROM BED TO CHAIR: A LITTLE
MOVING FROM LYING ON BACK TO SITTING ON SIDE OF FLAT BED WITH BEDRAILS: A LITTLE
HELP NEEDED FOR BATHING: A LOT
DRESSING REGULAR LOWER BODY CLOTHING: A LOT
CLIMB 3 TO 5 STEPS WITH RAILING: TOTAL
DAILY ACTIVITIY SCORE: 19
PERSONAL GROOMING: A LITTLE
MOVING FROM LYING ON BACK TO SITTING ON SIDE OF FLAT BED WITH BEDRAILS: A LITTLE
DRESSING REGULAR LOWER BODY CLOTHING: A LITTLE
HELP NEEDED FOR BATHING: A LITTLE
DRESSING REGULAR LOWER BODY CLOTHING: A LITTLE
HELP NEEDED FOR BATHING: A LITTLE
MOBILITY SCORE: 18
EATING MEALS: A LITTLE
STANDING UP FROM CHAIR USING ARMS: A LITTLE
STANDING UP FROM CHAIR USING ARMS: A LITTLE
MOVING TO AND FROM BED TO CHAIR: A LITTLE
TOILETING: A LITTLE
DRESSING REGULAR UPPER BODY CLOTHING: A LITTLE
TOILETING: A LITTLE
DAILY ACTIVITIY SCORE: 19
TURNING FROM BACK TO SIDE WHILE IN FLAT BAD: A LITTLE
MOVING TO AND FROM BED TO CHAIR: A LITTLE
MOVING FROM LYING ON BACK TO SITTING ON SIDE OF FLAT BED WITH BEDRAILS: A LITTLE
WALKING IN HOSPITAL ROOM: A LITTLE
DRESSING REGULAR LOWER BODY CLOTHING: A LITTLE
WALKING IN HOSPITAL ROOM: A LITTLE
PERSONAL GROOMING: A LITTLE
EATING MEALS: A LITTLE
CLIMB 3 TO 5 STEPS WITH RAILING: A LITTLE
PERSONAL GROOMING: A LITTLE
TOILETING: A LITTLE
DRESSING REGULAR UPPER BODY CLOTHING: A LITTLE
CLIMB 3 TO 5 STEPS WITH RAILING: A LITTLE
DAILY ACTIVITIY SCORE: 16
TURNING FROM BACK TO SIDE WHILE IN FLAT BAD: A LITTLE
DRESSING REGULAR UPPER BODY CLOTHING: A LITTLE
DAILY ACTIVITIY SCORE: 19
MOVING FROM LYING ON BACK TO SITTING ON SIDE OF FLAT BED WITH BEDRAILS: A LITTLE
MOVING TO AND FROM BED TO CHAIR: A LITTLE
DRESSING REGULAR UPPER BODY CLOTHING: A LITTLE
HELP NEEDED FOR BATHING: A LITTLE
CLIMB 3 TO 5 STEPS WITH RAILING: A LITTLE
PERSONAL GROOMING: A LITTLE
CLIMB 3 TO 5 STEPS WITH RAILING: A LITTLE
PERSONAL GROOMING: A LITTLE
STANDING UP FROM CHAIR USING ARMS: A LITTLE
DRESSING REGULAR UPPER BODY CLOTHING: A LITTLE
TURNING FROM BACK TO SIDE WHILE IN FLAT BAD: A LITTLE
WALKING IN HOSPITAL ROOM: A LITTLE
HELP NEEDED FOR BATHING: A LITTLE
WALKING IN HOSPITAL ROOM: A LITTLE
WALKING IN HOSPITAL ROOM: A LITTLE
MOBILITY SCORE: 16
STANDING UP FROM CHAIR USING ARMS: A LITTLE
STANDING UP FROM CHAIR USING ARMS: A LITTLE
TURNING FROM BACK TO SIDE WHILE IN FLAT BAD: A LITTLE
DAILY ACTIVITIY SCORE: 18
TOILETING: A LITTLE
MOBILITY SCORE: 18
TOILETING: A LITTLE
DRESSING REGULAR LOWER BODY CLOTHING: A LITTLE
MOBILITY SCORE: 18

## 2025-05-24 ASSESSMENT — PAIN SCALES - GENERAL
PAINLEVEL_OUTOF10: 0 - NO PAIN

## 2025-05-24 ASSESSMENT — PAIN - FUNCTIONAL ASSESSMENT
PAIN_FUNCTIONAL_ASSESSMENT: 0-10

## 2025-05-24 ASSESSMENT — PAIN SCALES - WONG BAKER: WONGBAKER_NUMERICALRESPONSE: NO HURT

## 2025-05-24 NOTE — PROGRESS NOTES
Occupational Therapy    Occupational Therapy Treatment    Name: Danika Demarco  MRN: [id number]  Department: [address]  Room: 2026/2026-  Date: 05/24/25  Time Calculation  Start Time: 0932  Stop Time: 0956  Time Calculation (min): 24 min    Assessment:  OT Assessment: Pt progressing to Min A for functional transfers and mobiltiy. Pt demo's ability to maintain sit/stand balance w/Min A-CGA. Required cues for orientation.  Barriers to Discharge Home: Cognition needs, Physical needs, Caregiver assistance  End of Session Communication: Bedside nurse  End of Session Patient Position: Up in chair, Alarm on  Plan:  Treatment Interventions: ADL retraining, Functional transfer training, UE strengthening/ROM, Endurance training, Cognitive reorientation, Patient/family training, Equipment evaluation/education, Neuromuscular reeducation  OT Frequency: 4 times per week  OT Discharge Recommendations: High intensity level of continued care  OT - OK to Discharge: Yes    Subjective     OT Visit Info:  OT Received On: 05/24/25  General:  General  Co-Treatment: PT  Co-Treatment Reason: Maximize pt/staff safety  Prior to Session Communication: Bedside nurse  Patient Position Received: Bed, 3 rail up, Alarm on  General Comment: Pt alert and agreeable to therapy.  Precautions:  Hearing/Visual Limitations: mild [place]  Medical Precautions: Fall precautions      Pain Assessment:  Pain Assessment  Pain Assessment: 0-10  0-10 (Numeric) Pain Score: 0 - No pain    Objective   Cognition:  Orientation Level: Disoriented to place (could name after 3-4 trials)       Bed Mobility/Transfers: Bed Mobility  Bed Mobility: Yes  Bed Mobility 1  Bed Mobility 1: Supine to sitting  Level of Assistance 1: Minimum assistance  Bed Mobility Comments 1: HOB raised    Transfers  Transfer: Yes  Transfer 1  Technique 1: Stand to sit, Sit to stand  Transfer Device 1: Walker  Transfer Level of Assistance 1: Contact guard  Trials/Comments 1: cues for hand  placement      Functional Mobility:  Functional Mobility  Functional Mobility Performed: Yes  Functional Mobility 1  Surface 1: Level tile  Device 1: Rolling walker  Assistance 1: Minimum assistance  Comments 1: Functional mobility within houehold distance w/FWW and Min A, no noted LOB.  Sitting Balance:  Dynamic Sitting Balance  Dynamic Sitting-Balance Support: Left upper extremity supported, Right upper extremity supported  Dynamic Sitting-Level of Assistance: Close supervision  Dynamic Sitting-Balance: Lateral lean  Dynamic Sitting-Comments: Lateral lean R/L side on each elbow x3 trials. No LOB       Therapy/Activity: Therapeutic Exercise  Therapeutic Exercise Performed: Yes  Therapeutic Exercise Activity 1: BUE exercises x10 reps elbow flex/ext, chest press seated EOB         Balance/Neuromuscular Re-Education  Balance/Neuromuscular Re-Education Activity Performed: Yes  Balance/Neuromuscular Re-Education Activity 1: Dynamic sitting balance seated EOB approx 5 min    Outcome Measures:  [facility] Daily Activity  Putting on and taking off regular lower body clothing: A lot  Bathing (including washing, rinsing, drying): A lot  Putting on and taking off regular upper body clothing: A little  Toileting, which includes using toilet, bedpan or urinal: A little  Taking care of personal grooming such as brushing teeth: A little  Eating Meals: A little  Daily Activity - Total Score: 16        Education Documentation  Body Mechanics, taught by ISH Aranda at 5/24/2025  1:14 PM.  Learner: Patient  Readiness: Acceptance  Method: Explanation  Response: Needs Reinforcement    ADL Training, taught by ISH Aranda at 5/24/2025  1:14 PM.  Learner: Patient  Readiness: Acceptance  Method: Explanation  Response: Needs Reinforcement    Education Comments  No comments found.      Goals:  Encounter Problems       Encounter Problems (Active)       ADLs       Patient will perform UB and LB bathing with modified independent level of  assistance. (Progressing)       Start:  05/23/25    Expected End:  06/06/25               BALANCE       Patient will tolerate standing for 10 minutes to modified independent level of assistance with least restrictive device in order to improve functional activity tolerance for ADL tasks. (Progressing)       Start:  05/23/25    Expected End:  06/06/25               EXERCISE/STRENGTHENING       Patient with increase RUE by 1/2 MMT grade strength. (Progressing)       Start:  05/23/25    Expected End:  06/06/25            Patient will progress RUE coordination to WFL (Progressing)       Start:  05/23/25    Expected End:  06/06/25               MOBILITY       Patient will perform Functional mobility max Household distances/Community Distances with modified independent level of assistance and least restrictive device in order to improve safety and functional mobility. (Progressing)       Start:  05/23/25    Expected End:  06/06/25               VISION       Patient will navigate environments with high visual stimuli safely Without loss of balance and Attending to obstacles with modified independent level of assistance. (Progressing)       Start:  05/23/25    Expected End:  06/06/25

## 2025-05-24 NOTE — CARE PLAN
The patient's goals for the shift include      The clinical goals for the shift include pt will not have a fall      Problem: General Stroke  Goal: Establish a mutual long term goal with patient by discharge  Outcome: Progressing  Goal: Demonstrate improvement in neurological exam throughout the shift  Outcome: Progressing  Goal: Maintain BP within ordered limits throughout shift  Outcome: Progressing  Goal: Participate in treatment (ie., meds, therapy) throughout shift  Outcome: Progressing  Goal: No symptoms of aspiration throughout shift  Outcome: Progressing  Goal: No symptoms of hemorrhage throughout shift  Outcome: Progressing  Goal: Tolerate enteral feeding throughout shift  Outcome: Progressing  Goal: Controlled blood glucose throughout shift  Outcome: Progressing  Goal: Decreased nausea/vomiting throughout shift  Outcome: Progressing  Goal: Out of bed three times today  Outcome: Progressing

## 2025-05-24 NOTE — CARE PLAN
The patient's goals for the shift include      The clinical goals for the shift include pt will remain free of falls      Problem: General Stroke  Goal: Establish a mutual long term goal with patient by discharge  Outcome: Progressing  Goal: Demonstrate improvement in neurological exam throughout the shift  Outcome: Progressing  Goal: Maintain BP within ordered limits throughout shift  Outcome: Progressing  Goal: Participate in treatment (ie., meds, therapy) throughout shift  Outcome: Progressing  Goal: No symptoms of aspiration throughout shift  Outcome: Progressing  Goal: No symptoms of hemorrhage throughout shift  Outcome: Progressing  Goal: Tolerate enteral feeding throughout shift  Outcome: Progressing  Goal: Controlled blood glucose throughout shift  Outcome: Progressing  Goal: Decreased nausea/vomiting throughout shift  Outcome: Progressing  Goal: Out of bed three times today  Outcome: Progressing     Problem: Safety - Adult  Goal: Free from fall injury  Outcome: Progressing     Problem: Discharge Planning  Goal: Discharge to home or other facility with appropriate resources  Outcome: Progressing     Problem: Chronic Conditions and Co-morbidities  Goal: Patient's chronic conditions and co-morbidity symptoms are monitored and maintained or improved  Outcome: Progressing     Problem: Nutrition  Goal: Nutrient intake appropriate for maintaining nutritional needs  Outcome: Progressing     Problem: Fall/Injury  Goal: Not fall by end of shift  Outcome: Progressing  Goal: Be free from injury by end of the shift  Outcome: Progressing  Goal: Verbalize understanding of personal risk factors for fall in the hospital  Outcome: Progressing  Goal: Verbalize understanding of risk factor reduction measures to prevent injury from fall in the home  Outcome: Progressing     Problem: Recent hospitalization or complication while living with HTN  Goal: Patient will effectively self-manage their HTN disease process  Outcome:  Progressing     Problem: Skin  Goal: Participates in plan/prevention/treatment measures  Outcome: Progressing  Goal: Prevent/manage excess moisture  Outcome: Progressing  Goal: Prevent/minimize sheer/friction injuries  Outcome: Progressing  Goal: Promote/optimize nutrition  Outcome: Progressing     Problem: Atrial Fibrillation  Goal: I will have no complications due to atrial fibrillation  Outcome: Progressing     Problem: Diabetes  Goal: Achieve decreasing blood glucose levels by end of shift  Outcome: Progressing  Goal: Increase stability of blood glucose readings by end of shift  Outcome: Progressing  Goal: Decrease in ketones present in urine by end of shift  Outcome: Progressing  Goal: Maintain electrolyte levels within acceptable range throughout shift  Outcome: Progressing  Goal: Maintain glucose levels >70mg/dl to <250mg/dl throughout shift  Outcome: Progressing  Goal: No changes in neurological exam by end of shift  Outcome: Progressing  Goal: Learn about and adhere to nutrition recommendations by end of shift  Outcome: Progressing  Goal: Vital signs within normal range for age by end of shift  Outcome: Progressing  Goal: Increase self care and/or family involovement by end of shift  Outcome: Progressing  Goal: Receive DSME education by end of shift  Outcome: Progressing

## 2025-05-24 NOTE — PROGRESS NOTES
Danika Demarco is a 89 y.o. female on day 3 of admission presenting with Cerebrovascular accident (CVA), unspecified mechanism (Multi).      Subjective      Danika Demarco is a 89 y.o. female with PMHx s/f A-fib (not on OAC 2/2 history of traumatic subdural hematoma after a fall), HTN, aortic stenosis, ulcerative colitis, HLD, T2DM presenting with global aphasia and right facial droop. LKW around 1445. Per family, her grandson notes that she is slurring speech and speaking gibberish. He talked to his grandpa/patient's  who called EMS. On EMS arrival, she became muted with right sided weakness. Later, she was found to be completely plegic on right side in ED with left gaze deviation and global aphasia. Family reports she also had a fall about a week ago, they went to urgent care due to her right rib pain, no acute rib fractures and is on lidocaine patches.     ED Course:   Vitals on presentation: T 36.7 °C (98.1 °F)  HR (!) 119  BP (!) 173/101  RR 16  O2 94 % None (Room air)  Labs:   CBC with WBC 7.3, Hgb 14.4, Plts 159.   CMP with glucose 105, Na 138, K 4.6, BUN 19, sCr 0.60, alk phos 63, ALT 12, AST 27, bilirubin 0.8. .   Trop 10.   EKG: A-fib at 100 bpm  Imaging - agree with radiology interpretation(s):   CT head-stable old anterolateral right frontal parietal craniotomy defect.  No acute intracranial bleed or focal mass effect.  Mild volume loss.  Mild chronic white matter ischemic disease.  CTA head and neck-tapering of distal left M1 and occlusion of dominant anterior branch of left M2.  Small posterior left M2 branches appear patent.  Interventions: Received TNK around 1600, admission to ICU for further management  5/22: Patient was seen and examined.  Status post TNK with significant improvement in her right upper extremity movements and ability to speech.  MRI and MRA was still pending to be done later today.  Discussed extensively with patient daughter.  Continue to hold DAPT for now.  Echocardiogram  with bubble study still pending report.  Continue permissive hypertension.  Neurologist recommendations appreciated.  Repeat CBC and BMP in AM.  5/23: Patient was seen and examined..  MRI and MRA showed left frontal lobe infarction.  Patient was in A-fib RVR this morning so I have resumed p.o. metoprolol.  Not on anticoagulation due to craniotomy from 12 years ago.  Discussed anticoagulation with patient and with neurologist.  Plan is to follow-up outpatient and decide in the future.  Resumed DAPT and atorvastatin.  Urine culture is positive for E. coli.  Continue IV Zosyn.  Await sensitivity.  Repeat CBC and BMP in a.m. currently awaiting authorization for placement in an acute rehabilitation facility.  5/24: Patient seen.  Patient doing well.  On medications recommended per neurology.  E. coli UTI is resistant to Macrodantin but susceptible to everything else.  Will de-escalate antibiotics.  Awaiting authorization for discharge to acute rehabilitation.    Objective     Last Recorded Vitals  /61 (BP Location: Right arm, Patient Position: Lying)   Pulse 80   Temp 36.5 °C (97.7 °F) (Temporal)   Resp 14   Wt 68.6 kg (151 lb 3.8 oz)   SpO2 96%   Intake/Output last 3 Shifts:    Intake/Output Summary (Last 24 hours) at 5/24/2025 1739  Last data filed at 5/24/2025 1426  Gross per 24 hour   Intake 250 ml   Output 300 ml   Net -50 ml       Admission Weight  Weight: 69 kg (152 lb 1.9 oz) (05/21/25 1554)    Daily Weight  05/24/25 : 68.6 kg (151 lb 3.8 oz)    Image Results  XR chest 1 view  Narrative: Interpreted By:  Sergio Fernandez,   STUDY:  XR CHEST 1 VIEW; 5/23/2025 11:30 am      INDICATION:  CLINICAL INFORMATION: Signs/Symptoms:Tachypnea.      COMPARISON:  05/21/2025      ACCESSION NUMBER(S):  [id number]      ORDERING CLINICIAN:  RISHI ANDRE      TECHNIQUE:  Portable chest one view.      FINDINGS:  The cardiac silhouette is quite prominent suggesting cardiomegaly.  The pulmonary vasculature is  slightly indistinct suggesting mild  pulmonary vascular congestion.  Pulmonary vascular congestion is  slightly less marked compared to the previous study. There is patchy  bibasilar atelectasis and infiltrate as well. Small effusions are  suspected.      Impression: 1. Slight improvement in the pulmonary vascular congestion compared  to 05/29/2025. Cardiomegaly.  2. Small bilateral effusions are present along with bibasilar patchy  atelectasis and infiltrate as well.      MACRO:  none      Signed by: Sergio Fernandez 5/23/2025 1:11 PM  Dictation workstation:   [id number]      Physical Exam  Vitals:    05/24/25 1505   BP: 104/61   Pulse: 80   Resp: 14   Temp: 36.5 °C (97.7 °F)   SpO2: 96%     Constitutional: Pleasant and cooperative. Laying in bed in no acute distress.    Skin: Warm and dry; no obvious lesions, rashes, pallor, or jaundice.   Eyes: EOMI. Anicteric sclera.   ENT: Mucous membranes moist  Head and Neck: Normocephalic, atraumatic   Respiratory: Nonlabored on RA. Lungs clear to auscultation bilaterally without obvious adventitious sounds. Chest rise is equal.  Cardiovascular: Irregular rhythm. Murmur present. Extremities are warm and well-perfused with good capillary refill (< 3 seconds). No chest wall tenderness.   GI: Abdomen soft, nontender, nondistended. No obvious organomegaly appreciated. Bowel sounds are present.  : No CVA tenderness.   MSK: No gross abnormalities appreciated. No limitations to AROM/PROM appreciated.   Extremities: No cyanosis, edema, or clubbing evident. Neurovascularly intact.   Neuro: See below for details  Psych: Appropriate mood and behavior.  Relevant Results                              Assessment & Plan  Cerebrovascular accident (CVA), unspecified mechanism (Multi)    Acute CVA with L M2 occlusion and tapering of distal L M1 s/p TNK  -CTA head and neck-tapering of distal left M1 and occlusion of dominant anterior branch of left M2  -MRI Brain w/o contrast (24 hours s/p  TNK)   -Echocardiogram with bubble study reviewed and showed ejection fraction of 63% with indeterminate diastolic filling due to atrial fibrillation  -Telemetry   -Lipid panel on 1/31/25: unremarkable and LDL within goal at 47  - Resumed DAPT with aspirin and Plavix  Continue atorvastatin  - Resumed antihypertensives metoprolol; plan to resume lisinopril by a.m. if patient's blood pressure tolerates  -aspiration and fall precautions  -PT/OT/SLP recommend acute rehabilitation  -Neurology on board and recommendations appreciated  5/24: Continue current treatment.  Awaiting authorization to discharge to acute rehabilitation.     Atrial fibrillation  - Not on oral anticoagulations due to history of subdural hematoma  - Hold metoprolol succinate for permissive hypertension    Acute lower urinary tract infection  Urine cultures positive for E. coli and sensitivities pending  Continue IV antibiotic Zosyn  Likely switch to oral on discharge  Trend CBC daily  5/24: E. coli UTI resistant to Macrodantin but susceptible to everything else.  Will de-escalate.     Abnormal CXR  -CXR with cardiomegaly with mild interstitial prominence diffusely  -BNP pending  -pt appears euvolemic on exam     HTN  - Hold antihypertensives as outlined above     T2DM  -A1c 6.0% on 1/31/2025  -Currently not on med therapy, BS well-controlled     Diet: Regular diet  DVT Prophylaxis: SCDs, Chemoprophylaxis deferred -- see above   Code Status: DNR   Additional Sources Reviewed: ED note day of admission; Primary Care / PCP / Family Medicine and Cardiology     Anticipated Length of Stay (LOS): Patient will require two-plus midnight stay for further evaluation and management of the above.            Spent 35 mins in the follow up management of this patient.      Morales Recio MD

## 2025-05-24 NOTE — PROGRESS NOTES
Physical Therapy    Physical Therapy Treatment    Patient Name: Danika Demarco  MRN: [id number]  Department: [address]  Room: 2026/2026-A  Today's Date: 5/24/2025  Time Calculation  Start Time: 0931  Stop Time: 0956  Time Calculation (min): 25 min         Assessment/Plan   PT Assessment  PT Assessment Results: Decreased strength, Decreased range of motion, Decreased endurance, Impaired balance, Decreased mobility, Decreased coordination, Decreased cognition, Impaired judgement, Decreased safety awareness, Impaired hearing (pt. increased gait to 50' x's 2 with FWW and Min A of 1. pt. returned to chair with calll ight inreach, chair alarmed and all needs met.)  End of Session Communication: Bedside nurse  End of Session Patient Position: Up in chair     PT Plan  Treatment/Interventions: Bed mobility, Transfer training, Gait training, Balance training, Strengthening, Therapeutic exercise, Home exercise program  PT Plan: Ongoing PT  PT Frequency: 5 times per week  PT Discharge Recommendations: High intensity level of continued care  PT Recommended Transfer Status: Assist x1, Assistive device  PT - OK to Discharge: Yes (Once medically appropriate)    PT Visit Info:        General Visit Information:   General  Co-Treatment: OT  Co-Treatment Reason: To maximize functional mobility outcomes while focusing on discipline specific needs.  Prior to Session Communication: Bedside nurse  Patient Position Received: Bed, 3 rail up, Alarm on  General Comment: pt. alert and agreeable for tx,    Subjective   Precautions:  Precautions  Medical Precautions: Fall precautions     Date/Time Vitals Session Patient Position Pulse Resp SpO2 BP MAP (mmHg)    05/24/25 1505 --  --  80  14  96 %  104/61  75                 Objective   Pain:  Pain Assessment  Pain Assessment: 0-10  0-10 (Numeric) Pain Score: 0 - No pain  Cognition:  Cognition  Orientation Level: Disoriented to place, Disoriented to time  Coordination:     Postural Control:      Extremity/Trunk Assessments:    Activity Tolerance:  Activity Tolerance  Endurance: Tolerates 10 - 20 min exercise with multiple rests  Treatments:  Therapeutic Exercise  Therapeutic Exercise Performed: Yes  Therapeutic Exercise Activity 1: AP's, LAQ's, Marching, HIp abduction/adduction all x's 15x's AMY. LE's. seated inchair.n    Bed Mobility  Bed Mobility: Yes (Suine---> Sit Min A of 1 with raised HOB and vc's for technique, hand placement.)    Ambulation/Gait Training  Ambulation/Gait Training Performed: Yes (Gait training with FWW x's 50' x's 2 . pt. fatigued so retrned back to room. Pt. start with slow rigid ashlyn.)  Transfers  Transfer: Yes (Sit<--->Stand CGA of 2 with vc's for safe hand placement.)         Outcome Measures:  [facility] Basic Mobility  Turning from your back to your side while in a flat bed without using bedrails: A little  Moving from lying on your back to sitting on the side of a flat bed without using bedrails: A little  Moving to and from bed to chair (including a wheelchair): A little  Standing up from a chair using your arms (e.g. wheelchair or bedside chair): A little  To walk in hospital room: A little  Climbing 3-5 steps with railing: Total  Basic Mobility - Total Score: 16    Education Documentation  Handouts, taught by Karo Casper PTA at 5/24/2025  3:57 PM.  Learner: Patient  Readiness: Acceptance  Method: Explanation, Demonstration, Teach-back  Response: Verbalizes Understanding, Demonstrated Understanding, No Evidence of Learning, Refused Teaching    Precautions, taught by Karo Casper PTA at 5/24/2025  3:57 PM.  Learner: Patient  Readiness: Acceptance  Method: Explanation, Demonstration, Teach-back  Response: Verbalizes Understanding, Demonstrated Understanding, No Evidence of Learning, Refused Teaching    Body Mechanics, taught by Karo Casper PTA at 5/24/2025  3:57 PM.  Learner: Patient  Readiness: Acceptance  Method: Explanation, Demonstration,  Teach-back  Response: Verbalizes Understanding, Demonstrated Understanding, No Evidence of Learning, Refused Teaching    Home Exercise Program, taught by Karo Casper PTA at 5/24/2025  3:57 PM.  Learner: Patient  Readiness: Acceptance  Method: Explanation, Demonstration, Teach-back  Response: Verbalizes Understanding, Demonstrated Understanding, No Evidence of Learning, Refused Teaching    Mobility Training, taught by Karo Casper PTA at 5/24/2025  3:57 PM.  Learner: Patient  Readiness: Acceptance  Method: Explanation, Demonstration, Teach-back  Response: Verbalizes Understanding, Demonstrated Understanding, No Evidence of Learning, Refused Teaching    Education Comments  No comments found.        OP EDUCATION:       Encounter Problems       Encounter Problems (Active)       PT Problem       Pt will completed supine <> sit bed mobility from flat bed with Supervision  (Progressing)       Start:  05/23/25    Expected End:  06/06/25            Pt will demonstrate sit to stand and bed/chair transfers with WW + Supervision  (Progressing)       Start:  05/23/25    Expected End:  06/06/25            Pt. will ambulate 40ft with WW + Supervision  (Progressing)       Start:  05/23/25    Expected End:  06/06/25            Pt will complete B LE strengthening Hep with independence (Progressing)       Start:  05/23/25    Expected End:  06/06/25               Pain - Adult

## 2025-05-25 VITALS
TEMPERATURE: 98.4 F | HEART RATE: 85 BPM | BODY MASS INDEX: 27.15 KG/M2 | DIASTOLIC BLOOD PRESSURE: 69 MMHG | OXYGEN SATURATION: 93 % | WEIGHT: 153.22 LBS | SYSTOLIC BLOOD PRESSURE: 118 MMHG | HEIGHT: 63 IN | RESPIRATION RATE: 18 BRPM

## 2025-05-25 LAB
GLUCOSE BLD MANUAL STRIP-MCNC: 112 MG/DL (ref 74–99)
GLUCOSE BLD MANUAL STRIP-MCNC: 147 MG/DL (ref 74–99)
GLUCOSE BLD MANUAL STRIP-MCNC: 150 MG/DL (ref 74–99)
GLUCOSE BLD MANUAL STRIP-MCNC: 156 MG/DL (ref 74–99)

## 2025-05-25 PROCEDURE — 99233 SBSQ HOSP IP/OBS HIGH 50: CPT | Performed by: INTERNAL MEDICINE

## 2025-05-25 PROCEDURE — 2500000001 HC RX 250 WO HCPCS SELF ADMINISTERED DRUGS (ALT 637 FOR MEDICARE OP)

## 2025-05-25 PROCEDURE — 2500000001 HC RX 250 WO HCPCS SELF ADMINISTERED DRUGS (ALT 637 FOR MEDICARE OP): Performed by: INTERNAL MEDICINE

## 2025-05-25 PROCEDURE — 82947 ASSAY GLUCOSE BLOOD QUANT: CPT

## 2025-05-25 PROCEDURE — 97530 THERAPEUTIC ACTIVITIES: CPT | Mod: GO,CO

## 2025-05-25 PROCEDURE — 97535 SELF CARE MNGMENT TRAINING: CPT | Mod: GO,CO

## 2025-05-25 PROCEDURE — 97116 GAIT TRAINING THERAPY: CPT | Mod: GP,CQ

## 2025-05-25 PROCEDURE — 2060000001 HC INTERMEDIATE ICU ROOM DAILY

## 2025-05-25 RX ADMIN — ATORVASTATIN CALCIUM 40 MG: 40 TABLET, FILM COATED ORAL at 21:21

## 2025-05-25 RX ADMIN — METOPROLOL SUCCINATE 25 MG: 50 TABLET, EXTENDED RELEASE ORAL at 08:06

## 2025-05-25 RX ADMIN — METOPROLOL SUCCINATE 50 MG: 50 TABLET, EXTENDED RELEASE ORAL at 08:07

## 2025-05-25 RX ADMIN — CLOPIDOGREL 75 MG: 75 TABLET ORAL at 08:07

## 2025-05-25 RX ADMIN — CEPHALEXIN 500 MG: 500 CAPSULE ORAL at 06:43

## 2025-05-25 RX ADMIN — CEPHALEXIN 500 MG: 500 CAPSULE ORAL at 18:01

## 2025-05-25 RX ADMIN — LISINOPRIL 40 MG: 20 TABLET ORAL at 08:07

## 2025-05-25 RX ADMIN — PANTOPRAZOLE SODIUM 40 MG: 40 TABLET, DELAYED RELEASE ORAL at 06:43

## 2025-05-25 RX ADMIN — ASPIRIN 81 MG: 81 TABLET, COATED ORAL at 08:06

## 2025-05-25 ASSESSMENT — PAIN - FUNCTIONAL ASSESSMENT
PAIN_FUNCTIONAL_ASSESSMENT: 0-10

## 2025-05-25 ASSESSMENT — COGNITIVE AND FUNCTIONAL STATUS - GENERAL
PERSONAL GROOMING: A LITTLE
WALKING IN HOSPITAL ROOM: A LITTLE
PERSONAL GROOMING: A LITTLE
HELP NEEDED FOR BATHING: A LITTLE
DRESSING REGULAR LOWER BODY CLOTHING: A LITTLE
DAILY ACTIVITIY SCORE: 16
STANDING UP FROM CHAIR USING ARMS: A LITTLE
MOBILITY SCORE: 17
WALKING IN HOSPITAL ROOM: A LITTLE
CLIMB 3 TO 5 STEPS WITH RAILING: A LITTLE
HELP NEEDED FOR BATHING: A LITTLE
WALKING IN HOSPITAL ROOM: A LITTLE
DAILY ACTIVITIY SCORE: 19
EATING MEALS: A LITTLE
DRESSING REGULAR LOWER BODY CLOTHING: A LOT
DRESSING REGULAR UPPER BODY CLOTHING: A LITTLE
MOVING TO AND FROM BED TO CHAIR: A LITTLE
DRESSING REGULAR UPPER BODY CLOTHING: A LITTLE
STANDING UP FROM CHAIR USING ARMS: A LITTLE
MOBILITY SCORE: 15
TOILETING: A LOT
DRESSING REGULAR LOWER BODY CLOTHING: A LITTLE
TURNING FROM BACK TO SIDE WHILE IN FLAT BAD: A LITTLE
MOVING FROM LYING ON BACK TO SITTING ON SIDE OF FLAT BED WITH BEDRAILS: A LITTLE
MOVING TO AND FROM BED TO CHAIR: A LITTLE
TOILETING: A LITTLE
MOBILITY SCORE: 19
TOILETING: A LITTLE
DRESSING REGULAR UPPER BODY CLOTHING: A LITTLE
TURNING FROM BACK TO SIDE WHILE IN FLAT BAD: A LITTLE
PERSONAL GROOMING: A LITTLE
TURNING FROM BACK TO SIDE WHILE IN FLAT BAD: A LOT
STANDING UP FROM CHAIR USING ARMS: A LITTLE
HELP NEEDED FOR BATHING: A LITTLE
MOVING TO AND FROM BED TO CHAIR: A LITTLE
CLIMB 3 TO 5 STEPS WITH RAILING: TOTAL
MOVING FROM LYING ON BACK TO SITTING ON SIDE OF FLAT BED WITH BEDRAILS: A LITTLE
CLIMB 3 TO 5 STEPS WITH RAILING: A LOT
DAILY ACTIVITIY SCORE: 19

## 2025-05-25 ASSESSMENT — PAIN SCALES - GENERAL
PAINLEVEL_OUTOF10: 0 - NO PAIN

## 2025-05-25 ASSESSMENT — ACTIVITIES OF DAILY LIVING (ADL): HOME_MANAGEMENT_TIME_ENTRY: 15

## 2025-05-25 NOTE — CARE PLAN
The patient's goals for the shift include      The clinical goals for the shift include pt will remain free of falls throughout shift      Problem: General Stroke  Goal: Establish a mutual long term goal with patient by discharge  Outcome: Progressing  Goal: Demonstrate improvement in neurological exam throughout the shift  Outcome: Progressing  Goal: Maintain BP within ordered limits throughout shift  Outcome: Progressing  Goal: Participate in treatment (ie., meds, therapy) throughout shift  Outcome: Progressing  Goal: No symptoms of aspiration throughout shift  Outcome: Progressing  Goal: No symptoms of hemorrhage throughout shift  Outcome: Progressing  Goal: Tolerate enteral feeding throughout shift  Outcome: Progressing  Goal: Controlled blood glucose throughout shift  Outcome: Progressing  Goal: Decreased nausea/vomiting throughout shift  Outcome: Progressing  Goal: Out of bed three times today  Outcome: Progressing     Problem: Safety - Adult  Goal: Free from fall injury  Outcome: Progressing     Problem: Discharge Planning  Goal: Discharge to home or other facility with appropriate resources  Outcome: Progressing     Problem: Chronic Conditions and Co-morbidities  Goal: Patient's chronic conditions and co-morbidity symptoms are monitored and maintained or improved  Outcome: Progressing     Problem: Nutrition  Goal: Nutrient intake appropriate for maintaining nutritional needs  Outcome: Progressing     Problem: Fall/Injury  Goal: Not fall by end of shift  Outcome: Progressing  Goal: Be free from injury by end of the shift  Outcome: Progressing  Goal: Verbalize understanding of personal risk factors for fall in the hospital  Outcome: Progressing  Goal: Verbalize understanding of risk factor reduction measures to prevent injury from fall in the home  Outcome: Progressing     Problem: Recent hospitalization or complication while living with HTN  Goal: Patient will effectively self-manage their HTN disease  process  Outcome: Progressing     Problem: Skin  Goal: Participates in plan/prevention/treatment measures  Outcome: Progressing  Goal: Prevent/manage excess moisture  Outcome: Progressing  Goal: Prevent/minimize sheer/friction injuries  Outcome: Progressing  Goal: Promote/optimize nutrition  Outcome: Progressing     Problem: Atrial Fibrillation  Goal: I will have no complications due to atrial fibrillation  Outcome: Progressing     Problem: Diabetes  Goal: Achieve decreasing blood glucose levels by end of shift  Outcome: Progressing  Goal: Increase stability of blood glucose readings by end of shift  Outcome: Progressing  Goal: Decrease in ketones present in urine by end of shift  Outcome: Progressing  Goal: Maintain electrolyte levels within acceptable range throughout shift  Outcome: Progressing  Goal: Maintain glucose levels >70mg/dl to <250mg/dl throughout shift  Outcome: Progressing  Goal: No changes in neurological exam by end of shift  Outcome: Progressing  Goal: Learn about and adhere to nutrition recommendations by end of shift  Outcome: Progressing  Goal: Vital signs within normal range for age by end of shift  Outcome: Progressing  Goal: Increase self care and/or family involovement by end of shift  Outcome: Progressing  Goal: Receive DSME education by end of shift  Outcome: Progressing

## 2025-05-25 NOTE — PROGRESS NOTES
Danika Demarco is a 89 y.o. female on day 4 of admission presenting with Cerebrovascular accident (CVA), unspecified mechanism (Multi).      Subjective      Danika Demarco is a 89 y.o. female with PMHx s/f A-fib (not on OAC 2/2 history of traumatic subdural hematoma after a fall), HTN, aortic stenosis, ulcerative colitis, HLD, T2DM presenting with global aphasia and right facial droop. LKW around 1445. Per family, her grandson notes that she is slurring speech and speaking gibberish. He talked to his grandpa/patient's  who called EMS. On EMS arrival, she became muted with right sided weakness. Later, she was found to be completely plegic on right side in ED with left gaze deviation and global aphasia. Family reports she also had a fall about a week ago, they went to urgent care due to her right rib pain, no acute rib fractures and is on lidocaine patches.     ED Course:   Vitals on presentation: T 36.7 °C (98.1 °F)  HR (!) 119  BP (!) 173/101  RR 16  O2 94 % None (Room air)  Labs:   CBC with WBC 7.3, Hgb 14.4, Plts 159.   CMP with glucose 105, Na 138, K 4.6, BUN 19, sCr 0.60, alk phos 63, ALT 12, AST 27, bilirubin 0.8. .   Trop 10.   EKG: A-fib at 100 bpm  Imaging - agree with radiology interpretation(s):   CT head-stable old anterolateral right frontal parietal craniotomy defect.  No acute intracranial bleed or focal mass effect.  Mild volume loss.  Mild chronic white matter ischemic disease.  CTA head and neck-tapering of distal left M1 and occlusion of dominant anterior branch of left M2.  Small posterior left M2 branches appear patent.  Interventions: Received TNK around 1600, admission to ICU for further management  5/22: Patient was seen and examined.  Status post TNK with significant improvement in her right upper extremity movements and ability to speech.  MRI and MRA was still pending to be done later today.  Discussed extensively with patient daughter.  Continue to hold DAPT for now.  Echocardiogram  with bubble study still pending report.  Continue permissive hypertension.  Neurologist recommendations appreciated.  Repeat CBC and BMP in AM.  5/23: Patient was seen and examined..  MRI and MRA showed left frontal lobe infarction.  Patient was in A-fib RVR this morning so I have resumed p.o. metoprolol.  Not on anticoagulation due to craniotomy from 12 years ago.  Discussed anticoagulation with patient and with neurologist.  Plan is to follow-up outpatient and decide in the future.  Resumed DAPT and atorvastatin.  Urine culture is positive for E. coli.  Continue IV Zosyn.  Await sensitivity.  Repeat CBC and BMP in a.m. currently awaiting authorization for placement in an acute rehabilitation facility.  5/24: Patient seen.  Patient doing well.  On medications recommended per neurology.  E. coli UTI is resistant to Macrodantin but susceptible to everything else.  Will de-escalate antibiotics.  Awaiting authorization for discharge to acute rehabilitation.  5/25: Patient seen.  Up and moving about with physical therapy.  Able to use walker but fatigues fairly quickly.  Discussed with family at bedside, still favor acute rehab.  Will await word from her insurance provider.  Discharge planning in progress.  Appears to be tolerating oral antibiotics.    Objective     Last Recorded Vitals  /70 (BP Location: Left arm, Patient Position: Lying)   Pulse 87   Temp 36.4 °C (97.6 °F) (Temporal)   Resp 17   Wt 69.5 kg (153 lb 3.5 oz)   SpO2 97%   Intake/Output last 3 Shifts:    Intake/Output Summary (Last 24 hours) at 5/25/2025 1408  Last data filed at 5/24/2025 1426  Gross per 24 hour   Intake 50 ml   Output --   Net 50 ml       Admission Weight  Weight: 69 kg (152 lb 1.9 oz) (05/21/25 1554)    Daily Weight  05/25/25 : 69.5 kg (153 lb 3.5 oz)    Image Results  XR chest 1 view  Narrative: Interpreted By:  Sergio Fernandez,   STUDY:  XR CHEST 1 VIEW; 5/23/2025 11:30 am      INDICATION:  CLINICAL INFORMATION:  Signs/Symptoms:Tachypnea.      COMPARISON:  05/21/2025      ACCESSION NUMBER(S):  [id number]      ORDERING CLINICIAN:  RISHI ANDRE      TECHNIQUE:  Portable chest one view.      FINDINGS:  The cardiac silhouette is quite prominent suggesting cardiomegaly.  The pulmonary vasculature is slightly indistinct suggesting mild  pulmonary vascular congestion.  Pulmonary vascular congestion is  slightly less marked compared to the previous study. There is patchy  bibasilar atelectasis and infiltrate as well. Small effusions are  suspected.      Impression: 1. Slight improvement in the pulmonary vascular congestion compared  to 05/29/2025. Cardiomegaly.  2. Small bilateral effusions are present along with bibasilar patchy  atelectasis and infiltrate as well.      MACRO:  none      Signed by: Sergio Fernandez 5/23/2025 1:11 PM  Dictation workstation:   [id number]      Physical Exam  Vitals:    05/25/25 1124   BP: 111/70   Pulse: 87   Resp: 17   Temp: 36.4 °C (97.6 °F)   SpO2: 97%     Constitutional: Pleasant and cooperative. Laying in bed in no acute distress.    Skin: Warm and dry; no obvious lesions, rashes, pallor, or jaundice.   Eyes: EOMI. Anicteric sclera.   ENT: Mucous membranes moist  Head and Neck: Normocephalic, atraumatic   Respiratory: Nonlabored on RA. Lungs clear to auscultation bilaterally without obvious adventitious sounds. Chest rise is equal.  Cardiovascular: Irregular rhythm. Murmur present. Extremities are warm and well-perfused with good capillary refill (< 3 seconds). No chest wall tenderness.   GI: Abdomen soft, nontender, nondistended. No obvious organomegaly appreciated. Bowel sounds are present.  : No CVA tenderness.   MSK: No gross abnormalities appreciated. No limitations to AROM/PROM appreciated.   Extremities: No cyanosis, edema, or clubbing evident. Neurovascularly intact.   Neuro: See below for details  Psych: Appropriate mood and behavior.  Relevant Results                               Assessment & Plan  Cerebrovascular accident (CVA), unspecified mechanism (Multi)    Acute CVA with L M2 occlusion and tapering of distal L M1 s/p TNK  -CTA head and neck-tapering of distal left M1 and occlusion of dominant anterior branch of left M2  -MRI Brain w/o contrast (24 hours s/p TNK)   -Echocardiogram with bubble study reviewed and showed ejection fraction of 63% with indeterminate diastolic filling due to atrial fibrillation  -Telemetry   -Lipid panel on 1/31/25: unremarkable and LDL within goal at 47  - Resumed DAPT with aspirin and Plavix  Continue atorvastatin  - Resumed antihypertensives metoprolol; plan to resume lisinopril by a.m. if patient's blood pressure tolerates  -aspiration and fall precautions  -PT/OT/SLP recommend acute rehabilitation  -Neurology on board and recommendations appreciated  5/24: Continue current treatment.  Awaiting authorization to discharge to acute rehabilitation.  5/25: Up and about with physical therapy.  Still recommend acute rehabilitation.     Atrial fibrillation  - Not on oral anticoagulations due to history of subdural hematoma  - Hold metoprolol succinate for permissive hypertension    Acute lower urinary tract infection  Urine cultures positive for E. coli and sensitivities pending  Continue IV antibiotic Zosyn  Likely switch to oral on discharge  Trend CBC daily  5/24: E. coli UTI resistant to Macrodantin but susceptible to everything else.  Will de-escalate.  5/25: Tolerating oral antibiotic therapy.     Abnormal CXR  -CXR with cardiomegaly with mild interstitial prominence diffusely  -BNP pending  -pt appears euvolemic on exam     HTN  - Hold antihypertensives as outlined above     T2DM  -A1c 6.0% on 1/31/2025  -Currently not on med therapy, BS well-controlled     Diet: Regular diet  DVT Prophylaxis: SCDs, Chemoprophylaxis deferred -- see above   Code Status: DNR   Additional Sources Reviewed: ED note day of admission; Primary Care / PCP / Family  Medicine and Cardiology     Anticipated Length of Stay (LOS): Patient will require two-plus midnight stay for further evaluation and management of the above.            Spent 35 mins in the follow up management of this patient.      Morales Recio MD

## 2025-05-25 NOTE — PROGRESS NOTES
Occupational Therapy    OT Treatment    Patient Name: Danika Demarco  MRN: [id number]  Today's Date: 5/25/2025  Time Calculation  Start Time: 1330  Stop Time: 1357  Time Calculation (min): 27 min       2026/2026-A    Assessment:  Evaluation/Treatment Tolerance: Patient limited by fatigue  Medical Staff Made Aware: Yes  End of Session Communication: Physician, Bedside nurse  End of Session Patient Position: Up in chair  OT Assessment Results: Decreased endurance, Decreased ADL status, Decreased safe judgment during ADL  Evaluation/Treatment Tolerance: Patient limited by fatigue  Medical Staff Made Aware: Yes    Plan:  Treatment Interventions: Functional transfer training, ADL retraining, Endurance training, Patient/family training, Neuromuscular reeducation  OT Recommended Transfer Status: Minimal assist, Assist of 2 (for safety with ambulation)  Treatment Interventions: Functional transfer training, ADL retraining, Endurance training, Patient/family training, Neuromuscular reeducation  Subjective     Current Problem:  Problem List[1]    General:  OT Received On: 05/25/25  Family/Caregiver Present: Yes  Caregiver Feedback: supportive  Co-Treatment: PT  Co-Treatment Reason: Pts levelof care and decreased safety  Patient Position Received: Up in chair        Pain:  Pain Assessment  Pain Assessment: 0-10  0-10 (Numeric) Pain Score: 0 - No pain  Objective      Activities of Daily Living:                LE Dressing  LE Dressing: Yes  Adult Briefs Level of Assistance: Maximum assistance  LE Dressing Where Assessed: Bedside commode  LE Dressing Comments: Decreased dyn sitting Pt unable to lean forward  to reach her feet.  Toileting  Toileting Level of Assistance: Moderate assistance  Where Assessed: Bedside commode    Functional Standing Tolerance:  Time: Pt stood for approx 1 min  Activity: anabela care  Functional Standing Tolerance Comments: fair plus need BUE support    Bed Mobility/Transfers: Bed Mobility  Bed Mobility:  Yes  Bed Mobility 1  Bed Mobility 1: Supine to sitting  Level of Assistance 1: Minimum assistance  Bed Mobility Comments 1: Req Max cues  Transfers  Transfer: Yes  Transfer 1  Technique 1: Sit to stand, Stand to sit  Transfer Device 1: Walker  Transfer Level of Assistance 1: Minimum assistance, Moderate verbal cues, Moderate tactile cues  Toilet Transfers  Toilet Transfer to: Standard bedside commode  Toilet Transfer Technique: Ambulating  Toilet Transfers: Minimal assistance, Verbal cues             Therapy/Activity:    Therapeutic Activity  Therapeutic Activity Performed: Yes  Therapeutic Activity 1: Ambulation with FWW     Balance/Neuromuscular Re-Education  Balance/Neuromuscular Re-Education Activity Performed: Yes  Balance/Neuromuscular Re-Education Activity 1: Pt wobbly especially when fatigued with ambulation    Strength:  Strength Comments: generalized weakness       Outcome Measures:[facility] Daily Activity  Putting on and taking off regular lower body clothing: A lot  Bathing (including washing, rinsing, drying): A little  Putting on and taking off regular upper body clothing: A little  Toileting, which includes using toilet, bedpan or urinal: A lot  Taking care of personal grooming such as brushing teeth: A little  Eating Meals: A little  Daily Activity - Total Score: 16  Education Documentation  Handouts, taught by JEAN PIERRE Mercado at 5/25/2025  2:55 PM.  Learner: Patient  Readiness: Acceptance  Method: Explanation, Demonstration  Response: Needs Reinforcement    Precautions, taught by JEAN PIERRE Mercado at 5/25/2025  2:55 PM.  Learner: Patient  Readiness: Acceptance  Method: Explanation, Demonstration  Response: Needs Reinforcement    Body Mechanics, taught by JEAN PIERRE Mercado at 5/25/2025  2:55 PM.  Learner: Patient  Readiness: Acceptance  Method: Explanation, Demonstration  Response: Needs Reinforcement    Home Exercise Program, taught by JEAN PIERRE Mercado at 5/25/2025  2:55  PM.  Learner: Patient  Readiness: Acceptance  Method: Explanation, Demonstration  Response: Needs Reinforcement    Mobility Training, taught by JEAN PIERRE Mercado at 5/25/2025  2:55 PM.  Learner: Patient  Readiness: Acceptance  Method: Explanation, Demonstration  Response: Needs Reinforcement    Body Mechanics, taught by JEAN PIERRE Mercado at 5/25/2025  2:55 PM.  Learner: Patient  Readiness: Acceptance  Method: Explanation, Demonstration  Response: Needs Reinforcement    ADL Training, taught by JEAN PIERRE Mercado at 5/25/2025  2:55 PM.  Learner: Patient  Readiness: Acceptance  Method: Explanation, Demonstration  Response: Needs Reinforcement    Education Comments  No comments found.             Goals:  Encounter Problems       Encounter Problems (Active)       ADLs       Patient will perform UB and LB bathing with modified independent level of assistance. (Progressing)       Start:  05/23/25    Expected End:  06/06/25               BALANCE       Patient will tolerate standing for 10 minutes to modified independent level of assistance with least restrictive device in order to improve functional activity tolerance for ADL tasks. (Progressing)       Start:  05/23/25    Expected End:  06/06/25               EXERCISE/STRENGTHENING       Patient with increase RUE by 1/2 MMT grade strength. (Progressing)       Start:  05/23/25    Expected End:  06/06/25            Patient will progress RUE coordination to WFL (Progressing)       Start:  05/23/25    Expected End:  06/06/25               MOBILITY       Patient will perform Functional mobility max Household distances/Community Distances with modified independent level of assistance and least restrictive device in order to improve safety and functional mobility. (Progressing)       Start:  05/23/25    Expected End:  06/06/25               VISION       Patient will navigate environments with high visual stimuli safely Without loss of balance and Attending to  obstacles with modified independent level of assistance. (Progressing)       Start:  05/23/25    Expected End:  06/06/25                                 [1]   Patient Active Problem List  Diagnosis    Allergic rhinitis    Anxiety    Aortic stenosis, mild    AF (paroxysmal atrial fibrillation) (Multi)    Cataract    Colitis, ulcerative (Multi)    Controlled type 2 diabetes mellitus (Multi)    Dizziness    Dysrhythmia    Essential hypertension    Hyperlipidemia    Hypertension, accelerated    Mild depression    Mitral valve regurgitation    OAB (overactive bladder)    Psoriasis    Hemangioma of skin and subcutaneous tissue    Nummular dermatitis    Other seborrheic keratosis    Rosacea, unspecified    Routine general medical examination at health care facility    Elevated liver enzymes    History of subdural hematoma    Post-menopausal osteoporosis    Urinary tract infection    Cerebrovascular accident (CVA), unspecified mechanism (Multi)

## 2025-05-25 NOTE — PROGRESS NOTES
Physical Therapy    Physical Therapy Treatment    Patient Name: Danika Demarco  MRN: [id number]  Department: [address]  Room: 2026/2026-A  Today's Date: 5/25/2025  Time Calculation  Start Time: 1337  Stop Time: 1357  Time Calculation (min): 20 min         Assessment/Plan   PT Assessment  PT Assessment Results: Decreased strength, Decreased range of motion, Decreased endurance, Impaired balance, Decreased mobility, Decreased coordination, Decreased cognition, Impaired judgement, Decreased safety awareness (pt. decreased gait tto 2 x's 30' today with FWW and Min A of 2. pt. requires Min A and verbal cueing throughout session to correct safety risks. pt. up in alarmed chair with call light inreach, all needs met, daughter present.)  End of Session Patient Position: Up in chair, Alarm on     PT Plan  Treatment/Interventions: Bed mobility, Transfer training, Gait training, Balance training, Strengthening, Therapeutic exercise, Home exercise program  PT Plan: Ongoing PT  PT Frequency: 5 times per week  PT Discharge Recommendations: High intensity level of continued care  PT Recommended Transfer Status: Assist x1, Assistive device  PT - OK to Discharge: Yes (Once medically appropriate)    PT Visit Info:        General Visit Information:   General  Co-Treatment: OT  Co-Treatment Reason: To maximize functional mobility outcomes while focusing discipline specific needs  Prior to Session Communication: Bedside nurse, PCT/NA/CTA  General Comment: pt. pleasantly confused, agreeable for tx, daughter present.    Subjective   Precautions:  Precautions  Medical Precautions: Fall precautions            Objective   Pain:  Pain Assessment  Pain Assessment: 0-10  0-10 (Numeric) Pain Score: 0 - No pain  Cognition:  Cognition  Orientation Level: Disoriented to time, Disoriented to situation  Coordination:     Postural Control:     Extremity/Trunk Assessments:    Activity Tolerance:  Activity Tolerance  Endurance: Tolerates 10 - 20 min exercise  with multiple rests  Treatments:  Therapeutic Activity  Therapeutic Activity Performed: Yes (Toilet training with [facility] with Mi nA of 1, vc's for safe hand placement as patient lets go of walker too far away from BS.  FALL RISK.)    Bed Mobility  Bed Mobility: Yes (Supine-->Sit Min A of 1 with vc's for hand placement and technique to promote independence.)    Ambulation/Gait Training  Ambulation/Gait Training Performed: Yes (Gait training with FWW x's 30' x's 2 and Min A of 2. pt. lets go of FWW and reaches for furniture when she feels unsteady despite multiple vc's. pt. reports fatigue with ambulation today and returned to alarmed chair with vc's for safe hand placement.)       Outcome Measures:  [facility] Basic Mobility  Turning from your back to your side while in a flat bed without using bedrails: A little  Moving from lying on your back to sitting on the side of a flat bed without using bedrails: A lot  Moving to and from bed to chair (including a wheelchair): A little  Standing up from a chair using your arms (e.g. wheelchair or bedside chair): A little  To walk in hospital room: A little  Climbing 3-5 steps with railing: Total  Basic Mobility - Total Score: 15    Education Documentation  Handouts, taught by Karo Casper PTA at 5/25/2025  2:56 PM.  Learner: Family, Patient  Readiness: Acceptance  Method: Explanation, Demonstration, Teach-back  Response: Verbalizes Understanding, Demonstrated Understanding, Needs Reinforcement    Precautions, taught by Karo Casper PTA at 5/25/2025  2:56 PM.  Learner: Family, Patient  Readiness: Acceptance  Method: Explanation, Demonstration, Teach-back  Response: Verbalizes Understanding, Demonstrated Understanding, Needs Reinforcement    Body Mechanics, taught by Karo Casper PTA at 5/25/2025  2:56 PM.  Learner: Family, Patient  Readiness: Acceptance  Method: Explanation, Demonstration, Teach-back  Response: Verbalizes Understanding, Demonstrated Understanding,  Needs Reinforcement    Home Exercise Program, taught by Karo Casper PTA at 5/25/2025  2:56 PM.  Learner: Family, Patient  Readiness: Acceptance  Method: Explanation, Demonstration, Teach-back  Response: Verbalizes Understanding, Demonstrated Understanding, Needs Reinforcement    Mobility Training, taught by Karo Casper PTA at 5/25/2025  2:56 PM.  Learner: Family, Patient  Readiness: Acceptance  Method: Explanation, Demonstration, Teach-back  Response: Verbalizes Understanding, Demonstrated Understanding, Needs Reinforcement    Education Comments  No comments found.        OP EDUCATION:       Encounter Problems       Encounter Problems (Active)       PT Problem       Pt will completed supine <> sit bed mobility from flat bed with Supervision  (Progressing)       Start:  05/23/25    Expected End:  06/06/25            Pt will demonstrate sit to stand and bed/chair transfers with WW + Supervision  (Progressing)       Start:  05/23/25    Expected End:  06/06/25            Pt. will ambulate 40ft with WW + Supervision  (Progressing)       Start:  05/23/25    Expected End:  06/06/25            Pt will complete B LE strengthening Hep with independence (Progressing)       Start:  05/23/25    Expected End:  06/06/25               Pain - Adult

## 2025-05-25 NOTE — CARE PLAN
The patient's goals for the shift include      The clinical goals for the shift include pt will remain free of falls

## 2025-05-26 LAB
ANION GAP SERPL CALC-SCNC: 7 MMOL/L (ref 10–20)
BUN SERPL-MCNC: 18 MG/DL (ref 6–23)
CALCIUM SERPL-MCNC: 8 MG/DL (ref 8.6–10.3)
CHLORIDE SERPL-SCNC: 108 MMOL/L (ref 98–107)
CO2 SERPL-SCNC: 25 MMOL/L (ref 21–32)
CREAT SERPL-MCNC: 0.48 MG/DL (ref 0.5–1.05)
EGFRCR SERPLBLD CKD-EPI 2021: 88 ML/MIN/1.73M*2
ERYTHROCYTE [DISTWIDTH] IN BLOOD BY AUTOMATED COUNT: 13 % (ref 11.5–14.5)
GLUCOSE BLD MANUAL STRIP-MCNC: 123 MG/DL (ref 74–99)
GLUCOSE BLD MANUAL STRIP-MCNC: 145 MG/DL (ref 74–99)
GLUCOSE BLD MANUAL STRIP-MCNC: 149 MG/DL (ref 74–99)
GLUCOSE BLD MANUAL STRIP-MCNC: 177 MG/DL (ref 74–99)
GLUCOSE SERPL-MCNC: 106 MG/DL (ref 74–99)
HCT VFR BLD AUTO: 39.6 % (ref 36–46)
HGB BLD-MCNC: 13.1 G/DL (ref 12–16)
MCH RBC QN AUTO: 32.3 PG (ref 26–34)
MCHC RBC AUTO-ENTMCNC: 33.1 G/DL (ref 32–36)
MCV RBC AUTO: 98 FL (ref 80–100)
NRBC BLD-RTO: 0 /100 WBCS (ref 0–0)
PLATELET # BLD AUTO: 232 X10*3/UL (ref 150–450)
POTASSIUM SERPL-SCNC: 3.5 MMOL/L (ref 3.5–5.3)
RBC # BLD AUTO: 4.06 X10*6/UL (ref 4–5.2)
SODIUM SERPL-SCNC: 136 MMOL/L (ref 136–145)
WBC # BLD AUTO: 9.9 X10*3/UL (ref 4.4–11.3)

## 2025-05-26 PROCEDURE — 2500000001 HC RX 250 WO HCPCS SELF ADMINISTERED DRUGS (ALT 637 FOR MEDICARE OP)

## 2025-05-26 PROCEDURE — 85027 COMPLETE CBC AUTOMATED: CPT | Performed by: INTERNAL MEDICINE

## 2025-05-26 PROCEDURE — 2500000001 HC RX 250 WO HCPCS SELF ADMINISTERED DRUGS (ALT 637 FOR MEDICARE OP): Performed by: INTERNAL MEDICINE

## 2025-05-26 PROCEDURE — 82947 ASSAY GLUCOSE BLOOD QUANT: CPT

## 2025-05-26 PROCEDURE — 2060000001 HC INTERMEDIATE ICU ROOM DAILY

## 2025-05-26 PROCEDURE — 36415 COLL VENOUS BLD VENIPUNCTURE: CPT | Performed by: INTERNAL MEDICINE

## 2025-05-26 PROCEDURE — 80048 BASIC METABOLIC PNL TOTAL CA: CPT | Performed by: INTERNAL MEDICINE

## 2025-05-26 PROCEDURE — 99232 SBSQ HOSP IP/OBS MODERATE 35: CPT | Performed by: INTERNAL MEDICINE

## 2025-05-26 RX ADMIN — CLOPIDOGREL 75 MG: 75 TABLET ORAL at 08:11

## 2025-05-26 RX ADMIN — CEPHALEXIN 500 MG: 500 CAPSULE ORAL at 06:27

## 2025-05-26 RX ADMIN — PANTOPRAZOLE SODIUM 40 MG: 40 TABLET, DELAYED RELEASE ORAL at 06:27

## 2025-05-26 RX ADMIN — METOPROLOL SUCCINATE 50 MG: 50 TABLET, EXTENDED RELEASE ORAL at 08:11

## 2025-05-26 RX ADMIN — LISINOPRIL 40 MG: 20 TABLET ORAL at 08:11

## 2025-05-26 RX ADMIN — METOPROLOL SUCCINATE 25 MG: 50 TABLET, EXTENDED RELEASE ORAL at 08:11

## 2025-05-26 RX ADMIN — CEPHALEXIN 500 MG: 500 CAPSULE ORAL at 18:03

## 2025-05-26 RX ADMIN — ATORVASTATIN CALCIUM 40 MG: 40 TABLET, FILM COATED ORAL at 20:34

## 2025-05-26 RX ADMIN — ASPIRIN 81 MG: 81 TABLET, COATED ORAL at 08:11

## 2025-05-26 ASSESSMENT — PAIN SCALES - GENERAL
PAINLEVEL_OUTOF10: 0 - NO PAIN

## 2025-05-26 ASSESSMENT — COGNITIVE AND FUNCTIONAL STATUS - GENERAL
DRESSING REGULAR LOWER BODY CLOTHING: A LITTLE
MOVING FROM LYING ON BACK TO SITTING ON SIDE OF FLAT BED WITH BEDRAILS: A LITTLE
DAILY ACTIVITIY SCORE: 19
HELP NEEDED FOR BATHING: A LITTLE
WALKING IN HOSPITAL ROOM: A LITTLE
TOILETING: A LITTLE
PERSONAL GROOMING: A LITTLE
DRESSING REGULAR UPPER BODY CLOTHING: A LITTLE
CLIMB 3 TO 5 STEPS WITH RAILING: A LOT
TURNING FROM BACK TO SIDE WHILE IN FLAT BAD: A LITTLE
MOBILITY SCORE: 17
MOVING TO AND FROM BED TO CHAIR: A LITTLE
STANDING UP FROM CHAIR USING ARMS: A LITTLE

## 2025-05-26 ASSESSMENT — PAIN - FUNCTIONAL ASSESSMENT
PAIN_FUNCTIONAL_ASSESSMENT: 0-10

## 2025-05-26 NOTE — PROGRESS NOTES
Danika Demarco is a 89 y.o. female on day 5 of admission presenting with Cerebrovascular accident (CVA), unspecified mechanism (Multi).      Subjective      Danika Demacro is a 89 y.o. female with PMHx s/f A-fib (not on OAC 2/2 history of traumatic subdural hematoma after a fall), HTN, aortic stenosis, ulcerative colitis, HLD, T2DM presenting with global aphasia and right facial droop. LKW around 1445. Per family, her grandson notes that she is slurring speech and speaking gibberish. He talked to his grandpa/patient's  who called EMS. On EMS arrival, she became muted with right sided weakness. Later, she was found to be completely plegic on right side in ED with left gaze deviation and global aphasia. Family reports she also had a fall about a week ago, they went to urgent care due to her right rib pain, no acute rib fractures and is on lidocaine patches.     ED Course:   Vitals on presentation: T 36.7 °C (98.1 °F)  HR (!) 119  BP (!) 173/101  RR 16  O2 94 % None (Room air)  Labs:   CBC with WBC 7.3, Hgb 14.4, Plts 159.   CMP with glucose 105, Na 138, K 4.6, BUN 19, sCr 0.60, alk phos 63, ALT 12, AST 27, bilirubin 0.8. .   Trop 10.   EKG: A-fib at 100 bpm  Imaging - agree with radiology interpretation(s):   CT head-stable old anterolateral right frontal parietal craniotomy defect.  No acute intracranial bleed or focal mass effect.  Mild volume loss.  Mild chronic white matter ischemic disease.  CTA head and neck-tapering of distal left M1 and occlusion of dominant anterior branch of left M2.  Small posterior left M2 branches appear patent.  Interventions: Received TNK around 1600, admission to ICU for further management  5/22: Patient was seen and examined.  Status post TNK with significant improvement in her right upper extremity movements and ability to speech.  MRI and MRA was still pending to be done later today.  Discussed extensively with patient daughter.  Continue to hold DAPT for now.  Echocardiogram  with bubble study still pending report.  Continue permissive hypertension.  Neurologist recommendations appreciated.  Repeat CBC and BMP in AM.  5/23: Patient was seen and examined..  MRI and MRA showed left frontal lobe infarction.  Patient was in A-fib RVR this morning so I have resumed p.o. metoprolol.  Not on anticoagulation due to craniotomy from 12 years ago.  Discussed anticoagulation with patient and with neurologist.  Plan is to follow-up outpatient and decide in the future.  Resumed DAPT and atorvastatin.  Urine culture is positive for E. coli.  Continue IV Zosyn.  Await sensitivity.  Repeat CBC and BMP in a.m. currently awaiting authorization for placement in an acute rehabilitation facility.  5/24: Patient seen.  Patient doing well.  On medications recommended per neurology.  E. coli UTI is resistant to Macrodantin but susceptible to everything else.  Will de-escalate antibiotics.  Awaiting authorization for discharge to acute rehabilitation.  5/25: Patient seen.  Up and moving about with physical therapy.  Able to use walker but fatigues fairly quickly.  Discussed with family at bedside, still favor acute rehab.  Will await word from her insurance provider.  Discharge planning in progress.  Appears to be tolerating oral antibiotics.  5/26: Patient seen.  No new complaints.  Still waiting for review by her insurance to authorize possible acute rehabilitation.  Discussed with family in room.    Objective     Last Recorded Vitals  /66 (BP Location: Left arm, Patient Position: Lying)   Pulse 76   Temp 36.2 °C (97.2 °F) (Temporal)   Resp 14   Wt 68.7 kg (151 lb 7.3 oz)   SpO2 98%   Intake/Output last 3 Shifts:  No intake or output data in the 24 hours ending 05/26/25 1554      Admission Weight  Weight: 69 kg (152 lb 1.9 oz) (05/21/25 1554)    Daily Weight  05/26/25 : 68.7 kg (151 lb 7.3 oz)    Image Results  XR chest 1 view  Narrative: Interpreted By:  Sergio Fernandez,   STUDY:  XR CHEST 1 VIEW;  5/23/2025 11:30 am      INDICATION:  CLINICAL INFORMATION: Signs/Symptoms:Tachypnea.      COMPARISON:  05/21/2025      ACCESSION NUMBER(S):  [id number]      ORDERING CLINICIAN:  RISHI ANDRE      TECHNIQUE:  Portable chest one view.      FINDINGS:  The cardiac silhouette is quite prominent suggesting cardiomegaly.  The pulmonary vasculature is slightly indistinct suggesting mild  pulmonary vascular congestion.  Pulmonary vascular congestion is  slightly less marked compared to the previous study. There is patchy  bibasilar atelectasis and infiltrate as well. Small effusions are  suspected.      Impression: 1. Slight improvement in the pulmonary vascular congestion compared  to 05/29/2025. Cardiomegaly.  2. Small bilateral effusions are present along with bibasilar patchy  atelectasis and infiltrate as well.      MACRO:  none      Signed by: Sergio Fernandez 5/23/2025 1:11 PM  Dictation workstation:   [id number]      Physical Exam  Vitals:    05/26/25 1502   BP: 101/66   Pulse: 76   Resp: 14   Temp: 36.2 °C (97.2 °F)   SpO2: 98%     Constitutional: Pleasant and cooperative. Laying in bed in no acute distress.    Skin: Warm and dry; no obvious lesions, rashes, pallor, or jaundice.   Eyes: EOMI. Anicteric sclera.   ENT: Mucous membranes moist  Head and Neck: Normocephalic, atraumatic   Respiratory: Nonlabored on RA. Lungs clear to auscultation bilaterally without obvious adventitious sounds. Chest rise is equal.  Cardiovascular: Irregular rhythm. Murmur present. Extremities are warm and well-perfused with good capillary refill (< 3 seconds). No chest wall tenderness.   GI: Abdomen soft, nontender, nondistended. No obvious organomegaly appreciated. Bowel sounds are present.  : No CVA tenderness.   MSK: No gross abnormalities appreciated. No limitations to AROM/PROM appreciated.   Extremities: No cyanosis, edema, or clubbing evident. Neurovascularly intact.   Neuro: See below for details  Psych: Appropriate  mood and behavior.  Relevant Results                              Assessment & Plan  Cerebrovascular accident (CVA), unspecified mechanism (Multi)    Acute CVA with L M2 occlusion and tapering of distal L M1 s/p TNK  -CTA head and neck-tapering of distal left M1 and occlusion of dominant anterior branch of left M2  -MRI Brain w/o contrast (24 hours s/p TNK)   -Echocardiogram with bubble study reviewed and showed ejection fraction of 63% with indeterminate diastolic filling due to atrial fibrillation  -Telemetry   -Lipid panel on 1/31/25: unremarkable and LDL within goal at 47  - Resumed DAPT with aspirin and Plavix  Continue atorvastatin  - Resumed antihypertensives metoprolol; plan to resume lisinopril by a.m. if patient's blood pressure tolerates  -aspiration and fall precautions  -PT/OT/SLP recommend acute rehabilitation  -Neurology on board and recommendations appreciated  5/24: Continue current treatment.  Awaiting authorization to discharge to acute rehabilitation.  5/25: Up and about with physical therapy.  Still recommend acute rehabilitation.  5/26: Awaiting authorization from insurance.     Atrial fibrillation  - Not on oral anticoagulations due to history of subdural hematoma  - Hold metoprolol succinate for permissive hypertension    Acute lower urinary tract infection  Urine cultures positive for E. coli and sensitivities pending  Continue IV antibiotic Zosyn  Likely switch to oral on discharge  Trend CBC daily  5/24: E. coli UTI resistant to Macrodantin but susceptible to everything else.  Will de-escalate.  5/25: Tolerating oral antibiotic therapy.     Abnormal CXR  -CXR with cardiomegaly with mild interstitial prominence diffusely  -BNP pending  -pt appears euvolemic on exam     HTN  - Hold antihypertensives as outlined above     T2DM  -A1c 6.0% on 1/31/2025  -Currently not on med therapy, BS well-controlled     Diet: Regular diet  DVT Prophylaxis: SCDs, Chemoprophylaxis deferred -- see above   Code  Status: DNR   Additional Sources Reviewed: ED note day of admission; Primary Care / PCP / Family Medicine and Cardiology     Anticipated Length of Stay (LOS): Patient will require two-plus midnight stay for further evaluation and management of the above.            Spent 35 mins in the follow up management of this patient.      Morales Recio MD

## 2025-05-26 NOTE — CARE PLAN
OCCUPATIONAL THERAPY ACUTE CARE - TREATMENT NOTE     Patient:  Neno Araujo  Location:  [address]  MRN:  [id number]  Today's date:  2021    Neno is a 87 y.o. male admitted on 2021 with Osteoarthritis of right hip, unspecified osteoarthritis type [M16.11]  Arthritis of hip [M16.10]. Principal problem is R JOSE.  Past Medical History  Neno has a past medical history of Arthritis, Duodenal ulcer, Hypertension, Osteoarthritis of left hip, and Spinal stenosis, lumbar.    History of Present Illness  21: right total  hip  replacement. WBAT. Hip precautions      Session details: daily treatment/progress note   occupational therapy    Start time:   1215  End time:  1235  Time ca min    General Observations  Start: Pt received seated in chair; he was agreeable to therapy and discussed with nurse who was agreeable for patient participation   End: Pt seated in chair at end of session; call bell in reach, all needs met, pt in NAD, nurse notified, posey chair alarm activated and personal items accessible    Precautions:   fall, hip (posterior hip precautions right) and weight bearing (right lower extremity weight-bearing as tolerated (WBAT))  Limitations: hearing and safety/cognition    VITALS     OT Vitals    Date/Time Pulse HR Source O2 Therapy BP BP Location BP Method Pt Position [facility]   21 1220 62 Monitor None (Room air) 118/74 Right upper arm Automatic Sitting CLG      OT Pain    Date/Time Pain Type Side/Orientation Location Rating: Rest Rating: Activity Interventions [facility]   21 1220 Pain Assessment right hip 4 - moderate pain 4 - moderate pain cold applied CLG   21 1230 Post Activity right hip 4 - moderate pain -- -- CLG          PRIOR LEVEL OF FUNCTION AND LIVING ENVIRONMENT     Prior Level of Function      Most Recent Value   Dominant Hand  right   Ambulation  assistive equipment   Transferring  assistive equipment   Toileting  independent   Bathing  independent  The patient's goals for the shift include      The clinical goals for the shift include pt to have no new neuro symptoms throughout the shift      Problem: General Stroke  Goal: Establish a mutual long term goal with patient by discharge  Outcome: Progressing  Goal: Demonstrate improvement in neurological exam throughout the shift  Outcome: Progressing  Goal: Maintain BP within ordered limits throughout shift  Outcome: Progressing  Goal: Participate in treatment (ie., meds, therapy) throughout shift  Outcome: Progressing  Goal: No symptoms of aspiration throughout shift  Outcome: Progressing  Goal: No symptoms of hemorrhage throughout shift  Outcome: Progressing  Goal: Tolerate enteral feeding throughout shift  Outcome: Progressing  Goal: Controlled blood glucose throughout shift  Outcome: Progressing  Goal: Decreased nausea/vomiting throughout shift  Outcome: Progressing  Goal: Out of bed three times today  Outcome: Progressing     Problem: Safety - Adult  Goal: Free from fall injury  Outcome: Progressing     Problem: Discharge Planning  Goal: Discharge to home or other facility with appropriate resources  Outcome: Progressing     Problem: Chronic Conditions and Co-morbidities  Goal: Patient's chronic conditions and co-morbidity symptoms are monitored and maintained or improved  Outcome: Progressing     Problem: Nutrition  Goal: Nutrient intake appropriate for maintaining nutritional needs  Outcome: Progressing     Problem: Fall/Injury  Goal: Not fall by end of shift  Outcome: Progressing  Goal: Be free from injury by end of the shift  Outcome: Progressing  Goal: Verbalize understanding of personal risk factors for fall in the hospital  Outcome: Progressing  Goal: Verbalize understanding of risk factor reduction measures to prevent injury from fall in the home  Outcome: Progressing     Problem: Recent hospitalization or complication while living with HTN  Goal: Patient will effectively self-manage their HTN disease    Dressing  independent   Eating  independent   Communication  understands/communicates without difficulty   Prior Level of Function Comment  pt reports independnet for fxl mobility with SPC recenlty 2/2 pain otherwise no AD. Independent for ADLs   Assistive Device Currently Used at Home  cane, straight          Prior Living Environment      Most Recent Value   Current Living Arrangements  home/apartment/condo   Living Environment Comment  Pt reports living in 2 story house with spouse 0 steps to enter. 1/2 bath first floor. full bath/bedroom upstairs with full flight.          Occupational Profile      Most Recent Value   Reason for Services/Referral  impaired ADLs/ functional mobilty   Successful Occupations  retired   Patient Goals  go home          OBJECTIVE     Cognition   Affect/MentalStatus: WFL  Orientation: oriented x 4  Follow Commands: WFL  Cognitive Function: executive function deficit: moderate deficit (insight/awareness of deficits, problem solving/reasoning and self-monitoring/self-correction) and safety deficit: severe deficit (awareness of need for assistance, insight into deficits/self-awareness, safety precautions awareness and safety precautions follow-through)    Motor Skills: functional activity tolerance/endurance (Fair)    Balance   Static Dynamic   Sitting Poor + (min A to maintain balance) Poor + (min A to maintain balance or right self; unable to weight shift)   Standing Poor + (min A to maintain balance) Poor + (min A to maintain balance or right self; unable to weight shift)   General Comments: Functional Reach Test  Trial One: Functional Reach Test (in): 0 inches  Trial Two: Functional Reach Test (in): 0 inches  Average (in): 0 inches    Functional Transfers   [place] Level DME / AD Cues / Comments   Sit to Stand minimum assist and 1 person assist walker (front-wheeled) From chair, cuing for hip precautions    Stand to Sit minimum assist and 1 person assist walker (front-wheeled)  process  Outcome: Progressing     Problem: Skin  Goal: Participates in plan/prevention/treatment measures  Outcome: Progressing  Goal: Prevent/manage excess moisture  Outcome: Progressing  Goal: Prevent/minimize sheer/friction injuries  Outcome: Progressing  Goal: Promote/optimize nutrition  Outcome: Progressing     Problem: Atrial Fibrillation  Goal: I will have no complications due to atrial fibrillation  Outcome: Progressing     Problem: Diabetes  Goal: Achieve decreasing blood glucose levels by end of shift  Outcome: Progressing  Goal: Increase stability of blood glucose readings by end of shift  Outcome: Progressing  Goal: Decrease in ketones present in urine by end of shift  Outcome: Progressing  Goal: Maintain electrolyte levels within acceptable range throughout shift  Outcome: Progressing  Goal: Maintain glucose levels >70mg/dl to <250mg/dl throughout shift  Outcome: Progressing  Goal: No changes in neurological exam by end of shift  Outcome: Progressing  Goal: Learn about and adhere to nutrition recommendations by end of shift  Outcome: Progressing  Goal: Vital signs within normal range for age by end of shift  Outcome: Progressing  Goal: Increase self care and/or family involovement by end of shift  Outcome: Progressing  Goal: Receive DSME education by end of shift  Outcome: Progressing        To chair    Toilet  minimum assist and 1 person assist grab bars/safety frame and walker, front-wheeled Pt will required 3:1 commode to increase heigh ton toilet to maintain hip precautions.    Patient education on proper hand placement when performing functional sit to stand transfers in preporation for toilet transfers. Patient demonstrated correctly with MIN A.       Activities of Daily Living   [place] Level Cues / DME / Comments   Bathing:   Upper Body                      Lower Body       Education on long handled sponge/ use of shower chair.    Dressing: Upper Body                      Lower Body set up Pull over shirt    minimum assist Patient education with demonstration on adaptive equipment for lower body dressing (reacher, sock aid, dressing stick, and long handled shoe horn) while seated. Pt verbalized understanding and perform LB dressing with use of AE  And increase timing. Daughter and spouse present and stated they will perform all dressing for pt at home to ensure pt does not break hip precautions.      Grooming/hygiene minimum assist         AM-PAC ™ - ADL (Current Function)     Putting on/taking off regular LB clothing 3 - A Little   Bathing 3 - A Little   Toileting 3 - A Little   Putting on/taking off regular UB clothing 4 - None   Help for taking care of personal grooming 4 - None   Eating meals 4 - None   AM-PAC ™ ADL Score 21       ASSESSMENT AND RECOMMENDATIONS     Progress Summary     OT treatment session complete. ADL [facility] 21 . Patient continues to present with functional limitations affecting areas of ADL’s and functional transfers. Pt making good progress toward all goals. Currently, patient performs functional transfers/ mobility MIN A X1 assist using RW, and ADL's with AE and min A. Pt requires cuing throughout session to maintain hip precautions with poor carry through.  Pt would benefit from continued skilled OT services to maximize safety and independence with daily tasks.  Recommend discharge to home with 24/7 assist/ care    Therapy Plan  Rehab potential:  good, to achieve stated therapy goals  Therapy frequency:  3-5 times/wk  Therapy interventions:  activity tolerance training, adaptive equipment training, BADL retraining, IADL retraining, functional balance retraining, strengthening exercise, transfer/mobility retraining    Discharge Plan  Recommended discharge:  home with assistance, home with home health  Anticipated equipment needs:  commode, 3-in-1, walker, front-wheeled    Patient/Family Therapy Goal Statement: go home    OT Goals      Most Recent Value   Transfer Goal 1   Activity/Assistive Device  toilet at 09/01/2021 0803   [place]  modified independence at 09/01/2021 0803   Time Frame  by discharge at 09/01/2021 0803   Progress/Outcome  goal ongoing at 09/01/2021 0803   Dressing Goal 2   Activity/Adaptive Equipment  dressing skills, all at 09/01/2021 0803   [place]  modified independence at 09/01/2021 0803   Time Frame  by discharge at 09/01/2021 0803   Progress/Outcome  goal ongoing at 09/01/2021 0803   Toileting Goal 1   Activity/Assistive Device  toileting skills, all at 09/01/2021 0803   [place]  modified independence at 09/01/2021 0803   Time Frame  by discharge at 09/01/2021 0803   Progress/Outcome  goal ongoing at 09/01/2021 0803   Precaution Goal 1   Activity  hip precautions at 09/01/2021 0803   [place]/Cues  independently at 09/01/2021 0803   Time Frame  by discharge at 09/01/2021 0803   Progress/Outcome  goal ongoing at 09/01/2021 0803

## 2025-05-26 NOTE — CARE PLAN
The patient's goals for the shift include      The clinical goals for the shift include pt will remain free of falls throughout shift      Problem: General Stroke  Goal: Establish a mutual long term goal with patient by discharge  Outcome: Progressing  Goal: Demonstrate improvement in neurological exam throughout the shift  Outcome: Progressing  Goal: Maintain BP within ordered limits throughout shift  Outcome: Progressing  Goal: Participate in treatment (ie., meds, therapy) throughout shift  Outcome: Progressing  Goal: No symptoms of aspiration throughout shift  Outcome: Progressing  Goal: No symptoms of hemorrhage throughout shift  Outcome: Progressing  Goal: Tolerate enteral feeding throughout shift  Outcome: Progressing  Goal: Controlled blood glucose throughout shift  Outcome: Progressing  Goal: Decreased nausea/vomiting throughout shift  Outcome: Progressing  Goal: Out of bed three times today  Outcome: Progressing

## 2025-05-27 LAB
GLUCOSE BLD MANUAL STRIP-MCNC: 126 MG/DL (ref 74–99)
GLUCOSE BLD MANUAL STRIP-MCNC: 135 MG/DL (ref 74–99)
GLUCOSE BLD MANUAL STRIP-MCNC: 142 MG/DL (ref 74–99)
GLUCOSE BLD MANUAL STRIP-MCNC: 198 MG/DL (ref 74–99)

## 2025-05-27 PROCEDURE — 2500000001 HC RX 250 WO HCPCS SELF ADMINISTERED DRUGS (ALT 637 FOR MEDICARE OP)

## 2025-05-27 PROCEDURE — 2500000001 HC RX 250 WO HCPCS SELF ADMINISTERED DRUGS (ALT 637 FOR MEDICARE OP): Performed by: INTERNAL MEDICINE

## 2025-05-27 PROCEDURE — 2060000001 HC INTERMEDIATE ICU ROOM DAILY

## 2025-05-27 PROCEDURE — 99239 HOSP IP/OBS DSCHRG MGMT >30: CPT | Performed by: INTERNAL MEDICINE

## 2025-05-27 PROCEDURE — 2500000004 HC RX 250 GENERAL PHARMACY W/ HCPCS (ALT 636 FOR OP/ED): Performed by: INTERNAL MEDICINE

## 2025-05-27 PROCEDURE — 97530 THERAPEUTIC ACTIVITIES: CPT | Mod: GO,CO

## 2025-05-27 PROCEDURE — 82947 ASSAY GLUCOSE BLOOD QUANT: CPT

## 2025-05-27 PROCEDURE — 97116 GAIT TRAINING THERAPY: CPT | Mod: CQ,GP

## 2025-05-27 RX ORDER — CLOPIDOGREL BISULFATE 75 MG/1
75 TABLET ORAL DAILY
Start: 2025-05-28

## 2025-05-27 RX ORDER — CEPHALEXIN 500 MG/1
500 CAPSULE ORAL 2 TIMES DAILY
Start: 2025-05-27 | End: 2025-05-29

## 2025-05-27 RX ORDER — ATORVASTATIN CALCIUM 40 MG/1
40 TABLET, FILM COATED ORAL NIGHTLY
Start: 2025-05-27

## 2025-05-27 RX ORDER — METOPROLOL SUCCINATE 50 MG/1
50 TABLET, EXTENDED RELEASE ORAL DAILY
Start: 2025-05-28

## 2025-05-27 RX ORDER — ASPIRIN 81 MG/1
81 TABLET ORAL DAILY
Qty: 30 TABLET | Refills: 2 | Status: SHIPPED | OUTPATIENT
Start: 2025-05-28 | End: 2025-08-26

## 2025-05-27 RX ADMIN — ASPIRIN 81 MG: 81 TABLET, COATED ORAL at 08:35

## 2025-05-27 RX ADMIN — METOPROLOL SUCCINATE 25 MG: 50 TABLET, EXTENDED RELEASE ORAL at 08:35

## 2025-05-27 RX ADMIN — CEPHALEXIN 500 MG: 500 CAPSULE ORAL at 08:37

## 2025-05-27 RX ADMIN — POLYETHYLENE GLYCOL 3350 17 G: 17 POWDER, FOR SOLUTION ORAL at 08:36

## 2025-05-27 RX ADMIN — CLOPIDOGREL 75 MG: 75 TABLET ORAL at 08:36

## 2025-05-27 RX ADMIN — METOPROLOL SUCCINATE 50 MG: 50 TABLET, EXTENDED RELEASE ORAL at 08:36

## 2025-05-27 RX ADMIN — ATORVASTATIN CALCIUM 40 MG: 40 TABLET, FILM COATED ORAL at 19:48

## 2025-05-27 RX ADMIN — CEPHALEXIN 500 MG: 500 CAPSULE ORAL at 19:48

## 2025-05-27 RX ADMIN — LISINOPRIL 40 MG: 20 TABLET ORAL at 08:36

## 2025-05-27 ASSESSMENT — COGNITIVE AND FUNCTIONAL STATUS - GENERAL
MOBILITY SCORE: 15
STANDING UP FROM CHAIR USING ARMS: A LITTLE
MOVING TO AND FROM BED TO CHAIR: A LITTLE
DRESSING REGULAR LOWER BODY CLOTHING: A LITTLE
HELP NEEDED FOR BATHING: A LITTLE
DRESSING REGULAR UPPER BODY CLOTHING: A LITTLE
CLIMB 3 TO 5 STEPS WITH RAILING: A LOT
DAILY ACTIVITIY SCORE: 18
MOBILITY SCORE: 23
CLIMB 3 TO 5 STEPS WITH RAILING: A LITTLE
TOILETING: A LITTLE
TURNING FROM BACK TO SIDE WHILE IN FLAT BAD: A LITTLE
DRESSING REGULAR UPPER BODY CLOTHING: A LITTLE
DRESSING REGULAR LOWER BODY CLOTHING: A LITTLE
CLIMB 3 TO 5 STEPS WITH RAILING: TOTAL
STANDING UP FROM CHAIR USING ARMS: A LITTLE
DAILY ACTIVITIY SCORE: 19
MOVING FROM LYING ON BACK TO SITTING ON SIDE OF FLAT BED WITH BEDRAILS: A LITTLE
TURNING FROM BACK TO SIDE WHILE IN FLAT BAD: A LITTLE
TOILETING: A LITTLE
MOVING TO AND FROM BED TO CHAIR: A LITTLE
MOVING FROM LYING ON BACK TO SITTING ON SIDE OF FLAT BED WITH BEDRAILS: A LITTLE
WALKING IN HOSPITAL ROOM: A LITTLE
HELP NEEDED FOR BATHING: A LITTLE
WALKING IN HOSPITAL ROOM: A LOT
DAILY ACTIVITIY SCORE: 24
PERSONAL GROOMING: A LITTLE
PERSONAL GROOMING: A LITTLE
EATING MEALS: A LITTLE
MOBILITY SCORE: 17

## 2025-05-27 ASSESSMENT — PAIN - FUNCTIONAL ASSESSMENT
PAIN_FUNCTIONAL_ASSESSMENT: 0-10
PAIN_FUNCTIONAL_ASSESSMENT: 0-10

## 2025-05-27 ASSESSMENT — PAIN SCALES - GENERAL
PAINLEVEL_OUTOF10: 0 - NO PAIN

## 2025-05-27 NOTE — PROGRESS NOTES
Physical Therapy  Physical Therapy Treatment    Patient Name: Danika Demarco  MRN: [id number]  Department: [address]  Room: 2026/2026-A  Today's Date: 5/27/2025  Time Calculation  Start Time: 1457  Stop Time: 1513  Time Calculation (min): 16 min    PT Plan  Treatment/Interventions: Bed mobility, Transfer training, Gait training, Balance training, Strengthening, Therapeutic exercise, Home exercise program  PT Plan: Ongoing PT  PT Frequency: 5 times per week  PT Discharge Recommendations: High intensity level of continued care  PT Recommended Transfer Status: Assist x1, Assistive device  PT - OK to Discharge: Yes (Once medically appropriate)    PT Visit Info:  PT Received On: 05/27/25  Response to Previous Treatment: Patient with no complaints from previous session.     Reason for Referral:   Stroke    Precautions:  Falls    Pain:  No pain    Cognition:  Oriented X4    Activity Tolerance:  Activity Tolerance  Endurance: Tolerates 10 - 20 min exercise with multiple rests    Treatments:  Bed Mobility  Bed Mobility: Yes  Bed Mobility 1  Bed Mobility 1: Supine to sitting  Level of Assistance 1: Contact guard  Bed Mobility 2  Bed Mobility  2: Scooting  Level of Assistance 2: Contact guard    Transfers  Transfer: Yes  Transfer 1  Technique 1: Sit to stand  Transfer Device 1: Walker  Transfer Level of Assistance 1: Minimum assistance  Transfers 2  Technique 2: Stand to sit  Transfer Device 2: Walker  Transfer Level of Assistance 2: Minimum assistance  Transfers 3  Technique 3: Commode  Transfer Device 3: Walker  Transfer Level of Assistance 3: Minimum assistance    Ambulation/Gait Training  Ambulation/Gait Training Performed: Yes  Ambulation/Gait Training 1  Comments/Distance (ft) 1: 4 feet and 22 feet with FWW, Debi        Pt sitting in bedside chair following tx. Alarm on and call light in reach.      Outcome Measures:  [facility] Basic Mobility  Turning from your back to your side while in a flat bed without using bedrails: A  little  Moving from lying on your back to sitting on the side of a flat bed without using bedrails: A little  Moving to and from bed to chair (including a wheelchair): A little  Standing up from a chair using your arms (e.g. wheelchair or bedside chair): A little  To walk in hospital room: A lot  Climbing 3-5 steps with railing: Total  Basic Mobility - Total Score: 15    Education Documentation  Handouts, taught by Devin Beltran PTA at 5/27/2025  3:16 PM.  Learner: Patient  Readiness: Acceptance  Method: Explanation, Demonstration  Response: Verbalizes Understanding, Needs Reinforcement    Precautions, taught by Devin Beltran PTA at 5/27/2025  3:16 PM.  Learner: Patient  Readiness: Acceptance  Method: Explanation, Demonstration  Response: Verbalizes Understanding, Needs Reinforcement    Body Mechanics, taught by Devin Beltran PTA at 5/27/2025  3:16 PM.  Learner: Patient  Readiness: Acceptance  Method: Explanation, Demonstration  Response: Verbalizes Understanding, Needs Reinforcement    Home Exercise Program, taught by Devin Beltran PTA at 5/27/2025  3:16 PM.  Learner: Patient  Readiness: Acceptance  Method: Explanation, Demonstration  Response: Verbalizes Understanding, Needs Reinforcement    Mobility Training, taught by Devin Beltran PTA at 5/27/2025  3:16 PM.  Learner: Patient  Readiness: Acceptance  Method: Explanation, Demonstration  Response: Verbalizes Understanding, Needs Reinforcement    Education Comments  No comments found.        OP EDUCATION:       Encounter Problems       Encounter Problems (Active)       PT Problem       Pt will completed supine <> sit bed mobility from flat bed with Supervision  (Progressing)       Start:  05/23/25    Expected End:  06/06/25            Pt will demonstrate sit to stand and bed/chair transfers with WW + Supervision  (Progressing)       Start:  05/23/25    Expected End:  06/06/25            Pt. will ambulate 40ft with WW + Supervision  (Progressing)       Start:   05/23/25    Expected End:  06/06/25            Pt will complete B LE strengthening Hep with independence (Progressing)       Start:  05/23/25    Expected End:  06/06/25               Pain - Adult

## 2025-05-27 NOTE — PROGRESS NOTES
Music Therapy Note    Danika Demarco was referred by Length of stay rounding    Therapy Session  Referral Type: New referral this admission (Length of stay rounds)  Visit Type: New visit  Session Start Time: 1048  Session End Time: 1050  Intervention Delivery: In-person     Pre-assessment  Unable to Assess Reason: Outcomes not applicable, Outcomes not assessed         Treatment/Interventions  Music Therapy Interventions: Assessment    Post-assessment  Total Session Time (min): 2 minutes    Narrative  Assessment Detail: Pt. was sitting in chair beside bed, alert and with appropriate affect.  Plan: MT introduced music therapy services to pt. for during her stay to assist with coping, pain management, and relaxation  Evaluation: Pt. was agreeable to music therapy services but requested the MT stop by later. Said she enjoys polka music  Follow-up: Will f/u with pt. later as able. (Tried checking in later this afternoon; pt. on phone)    Education Documentation  Coping Strategies, taught by Sully Onofre at 5/27/2025  5:04 PM.  Learner: Patient  Readiness: Acceptance  Method: Explanation  Response: Demonstrated Understanding    Relaxation, taught by Sully Onofre at 5/27/2025  5:04 PM.  Learner: Patient  Readiness: Acceptance  Method: Explanation  Response: Demonstrated Understanding    Pain Management, taught by Sully Onofre at 5/27/2025  5:04 PM.  Learner: Patient  Readiness: Acceptance  Method: Explanation  Response: Demonstrated Understanding

## 2025-05-27 NOTE — PROGRESS NOTES
5/27/ 25 1421  Updated pt and pt son that auth was approved for [facility] and currently awaiting discharge orders. Both expressed appreciation for information and confirmed [facility] as FOC.     5/27/25 1704   05/27/25 1702   Discharge Planning   Expected Discharge Disposition Rehab   What day is the transport expected? 05/27/25   What time is the transport expected? 2000   Intensity of Service   Intensity of Service >30 min     Updated med team of 8pm transport to [facility]. Ambulance form on chart. Bedside RN has report number.   Kendy Downey RN, BSN, [place]/ [facility] Supervisor

## 2025-05-27 NOTE — DISCHARGE SUMMARY
Discharge Diagnosis    Acute CVA with left M2 occlusion and tapering of distal left M1 status post TNK.  Appreciate input from neurology team.  Patient has been placed on DAPT with aspirin and Plavix.  She has a known history of A-fib but is not anticoagulated due to subdural hematoma.  Doing well with PT and OT.  Discharge to acute rehabilitation.  Continue DAPT x 90 days and then continue on Plavix alone.  Continue high intensity statin.  Follow-up with neurology in 4 weeks  Chronic atrial fibrillation.  No oral anticoagulation due to subdural hematoma.  Metoprolol dose has been slightly increased.  E. coli urinary tract infection.  Currently on Keflex.  Chest x-ray with mild interstitial prominence, may have been related to hypertension.  Currently on room air.  Essential hypertension.  Initially allowed permissive hypertension but restarted medications.  Metoprolol has been slightly increased.  DM type II.  A1c was 6.0.  Currently not on medication therapy and blood sugars are well-controlled.      Issues Requiring Follow-Up  Discharge to acute rehabilitation  Follow-up with PCP in 1 to 2 weeks after discharge from acute rehabilitation  Follow-up with neurology in 4 weeks    Test Results Pending At Discharge  Pending Labs       No current pending labs.            Hospital Course   Danika Demarco is a 89 y.o. female with PMHx s/f A-fib (not on OAC 2/2 history of traumatic subdural hematoma after a fall), HTN, aortic stenosis, ulcerative colitis, HLD, T2DM presenting with global aphasia and right facial droop. LKW around 1445. Per family, her grandson notes that she is slurring speech and speaking gibberish. He talked to his grandpa/patient's  who called EMS. On EMS arrival, she became muted with right sided weakness. Later, she was found to be completely plegic on right side in ED with left gaze deviation and global aphasia. Family reports she also had a fall about a week ago, they went to urgent care due to  her right rib pain, no acute rib fractures and is on lidocaine patches.     ED Course:   Vitals on presentation: T 36.7 °C (98.1 °F)  HR (!) 119  BP (!) 173/101  RR 16  O2 94 % None (Room air)  Labs:   CBC with WBC 7.3, Hgb 14.4, Plts 159.   CMP with glucose 105, Na 138, K 4.6, BUN 19, sCr 0.60, alk phos 63, ALT 12, AST 27, bilirubin 0.8. .   Trop 10.   EKG: A-fib at 100 bpm  Imaging - agree with radiology interpretation(s):   CT head-stable old anterolateral right frontal parietal craniotomy defect.  No acute intracranial bleed or focal mass effect.  Mild volume loss.  Mild chronic white matter ischemic disease.  CTA head and neck-tapering of distal left M1 and occlusion of dominant anterior branch of left M2.  Small posterior left M2 branches appear patent.  Interventions: Received TNK around 1600, admission to ICU for further management  5/22: Patient was seen and examined.  Status post TNK with significant improvement in her right upper extremity movements and ability to speech.  MRI and MRA was still pending to be done later today.  Discussed extensively with patient daughter.  Continue to hold DAPT for now.  Echocardiogram with bubble study still pending report.  Continue permissive hypertension.  Neurologist recommendations appreciated.  Repeat CBC and BMP in AM.  5/23: Patient was seen and examined..  MRI and MRA showed left frontal lobe infarction.  Patient was in A-fib RVR this morning so I have resumed p.o. metoprolol.  Not on anticoagulation due to craniotomy from 12 years ago.  Discussed anticoagulation with patient and with neurologist.  Plan is to follow-up outpatient and decide in the future.  Resumed DAPT and atorvastatin.  Urine culture is positive for E. coli.  Continue IV Zosyn.  Await sensitivity.  Repeat CBC and BMP in a.m. currently awaiting authorization for placement in an acute rehabilitation facility.  5/24: Patient seen.  Patient doing well.  On medications recommended per neurology.   E. coli UTI is resistant to Macrodantin but susceptible to everything else.  Will de-escalate antibiotics.  Awaiting authorization for discharge to acute rehabilitation.  5/25: Patient seen.  Up and moving about with physical therapy.  Able to use walker but fatigues fairly quickly.  Discussed with family at bedside, still favor acute rehab.  Will await word from her insurance provider.  Discharge planning in progress.  Appears to be tolerating oral antibiotics.  5/26: Patient seen.  No new complaints.  Still waiting for review by her insurance to authorize possible acute rehabilitation.  Discussed with family in room.  5/27: No new complaints.  Okay to discharge to acute rehabilitation.  Complete oral antibiotics for UTI.  Plan for DAPT x 90 days and then switching to Plavix alone afterwards.  Patient may want to revisit anticoagulation with her cardiology team.  Currently holding anticoagulation for A-fib due to subdural hematoma.  No new complaints.    This discharge took greater than 35 minutes to arrange.    Pertinent Physical Exam At Time of Discharge  Physical Exam  Constitutional: Pleasant and cooperative. Laying in bed in no acute distress.    Skin: Warm and dry; no obvious lesions, rashes, pallor, or jaundice.   Eyes: EOMI. Anicteric sclera.   ENT: Mucous membranes moist  Head and Neck: Normocephalic, atraumatic   Respiratory: Nonlabored on RA. Lungs clear to auscultation bilaterally without obvious adventitious sounds. Chest rise is equal.  Cardiovascular: Irregular rhythm. Murmur present. Extremities are warm and well-perfused with good capillary refill (< 3 seconds). No chest wall tenderness.   GI: Abdomen soft, nontender, nondistended. No obvious organomegaly appreciated. Bowel sounds are present.  : No CVA tenderness.   MSK: No gross abnormalities appreciated. No limitations to AROM/PROM appreciated.   Extremities: No cyanosis, edema, or clubbing evident. Neurovascularly intact.   Neuro: See below  for details  Psych: Appropriate mood and behavior.  Relevant Results    Home Medications     Medication List      START taking these medications     atorvastatin 40 mg tablet; Commonly known as: Lipitor; Take 1 tablet (40   mg) by mouth once daily at bedtime.   cephalexin 500 mg capsule; Commonly known as: Keflex; Take 1 capsule   (500 mg) by mouth 2 times a day for 2 days.   clopidogrel 75 mg tablet; Commonly known as: Plavix; Take 1 tablet (75   mg) by mouth once daily.; Start taking on: May 28, 2025     CHANGE how you take these medications     * aspirin 325 mg tablet; What changed: Another medication with the same   name was added. Make sure you understand how and when to take each.   * aspirin 81 mg EC tablet; Take 1 tablet (81 mg) by mouth once daily.   Take for 90 days then STOP; Start taking on: May 28, 2025; What changed:   You were already taking a medication with the same name, and this   prescription was added. Make sure you understand how and when to take   each.   * metoprolol succinate XL 50 mg 24 hr tablet; Commonly known as:   Toprol-XL; TAKE 1 TABLET BY MOUTH ONCE DAILY WITH  25MG  TAB  TO  TOTAL    75MG; What changed: Another medication with the same name was changed.   Make sure you understand how and when to take each.   * metoprolol succinate XL 50 mg 24 hr tablet; Commonly known as:   Toprol-XL; Take 1 tablet (50 mg) by mouth once daily. Do not crush or   chew.; Start taking on: May 28, 2025; What changed: medication strength,   See the new instructions.  * This list has 4 medication(s) that are the same as other medications   prescribed for you. Read the directions carefully, and ask your doctor or   other care provider to review them with you.     CONTINUE taking these medications     Gemtesa 75 mg tablet; Generic drug: vibegron; Take 1 tablet (75 mg) by   mouth once daily.   lidocaine 5 % patch; Commonly known as: Lidoderm; Place 1 patch over 12   hours on the skin once daily. Apply to  painful area 12 hours per day,   remove for 12 hours.   lisinopril 40 mg tablet; Take 1 tablet by mouth once daily     STOP taking these medications     nitrofurantoin (macrocrystal-monohydrate) 100 mg capsule; Commonly known   as: Macrobid       Outpatient Follow-Up  Future Appointments   Date Time Provider Department Center   6/5/2025 10:00 AM Ying Good PharmD [id number]   11/13/2025 11:30 AM Milton Ashby MD [id number] [facility]       Morales Recio MD

## 2025-05-27 NOTE — NURSING NOTE
Called nurse to nurse to [facility] PHAN for patient to transfer to [facility]. Physicians to  patient at 8pm.

## 2025-05-27 NOTE — DISCHARGE INSTRUCTIONS
Follow with your PCP within 1-2 weeks of discharge from facility. Call to schedule. Bring all your prescriptions and instructions to your visit. Return to ED if your symptoms come back.    Follow-up with neurology in 4 weeks

## 2025-05-27 NOTE — CARE PLAN
The patient's goals for the shift include get rest and stay informed.    The clinical goals for the shift include Pt will maintain stable [facility] Neuro Checks throughout the shift.    Over the shift, the patient did make progress toward the following goals. Barriers to progression include understanding. Recommendations to address these barriers include education.

## 2025-05-27 NOTE — CARE PLAN
The patient's goals for the shift include  pt will be HDS throughout shift    The clinical goals for the shift include Pt will maintain stable [facility] Neuro Checks throughout the shift.    Over the shift, the patient did make progress toward the following goals. Barriers to progression include understanding. Recommendations to address these barriers include education.

## 2025-05-27 NOTE — PROGRESS NOTES
Occupational Therapy    Occupational Therapy Treatment    Name: Danika Demarco  MRN: [id number]  Department: [address]  Room: 2026/2026-  Date: 05/27/25  Time Calculation  Start Time: 0939  Stop Time: 1000  Time Calculation (min): 21 min    Assessment:  OT Assessment: Pt progressing to Min A/CGA for functional transfers and mobility. ADLs completed w/Min A/CGA. Cognition improved this session. Pt demo's good safety awareness, requires minimal verbal cues throughout activity. Able to visually track therapist during session.  Barriers to Discharge Home: Cognition needs, Physical needs, Caregiver assistance  End of Session Communication: Bedside nurse  End of Session Patient Position: Up in chair, Alarm on  Plan:  Treatment Interventions: Functional transfer training, ADL retraining, Endurance training, Patient/family training, Neuromuscular reeducation  OT Frequency: 4 times per week  OT Discharge Recommendations: High intensity level of continued care  OT Recommended Transfer Status: Minimal assist, Assist of 2 (for safety with ambulation)  OT - OK to Discharge: Yes    Subjective     OT Visit Info:  OT Received On: 05/27/25  General:  General  Prior to Session Communication: Bedside nurse  Patient Position Received: Bed, 3 rail up, Alarm on  General Comment: Pt alert and agreeable to therapy. Answered cognition questions correctly with delayed response. Pt states she is eager to return home to take care of .  Precautions:  Hearing/Visual Limitations: mild Council  Medical Precautions: Fall precautions      Pain Assessment:  Pain Assessment  Pain Assessment: 0-10  0-10 (Numeric) Pain Score: 0 - No pain    Objective   Cognition:  Orientation Level: Disoriented to place  Activities of Daily Living:      Grooming  Grooming Level of Assistance: Minimum assistance  Grooming Where Assessed: Standing sinkside  Grooming Comments: Hand hygiene completed standing sinkside w/FWW and Min A to maintain standing balance. Required 1  verbal cue for sequencing.      Toileting  Toileting Level of Assistance: Contact guard  Where Assessed: Bedside commode  Toileting Comments: Pt completed pericare and managed LE clothing after toileting standing w/FWW and CGA to maintain standing balance.    Functional Standing Tolerance:  Functional Standing Tolerance  Time: 30 sec  Activity: pericare  Functional Standing Tolerance Comments: LUE support  Bed Mobility/Transfers: Bed Mobility  Bed Mobility: Yes  Bed Mobility 1  Bed Mobility 1: Supine to sitting  Level of Assistance 1: Contact guard  Bed Mobility Comments 1: HOB raised, slight assist to reposition    Transfers  Transfer: Yes  Transfer 1  Technique 1: Sit to stand, Stand to sit  Transfer Device 1: Walker  Transfer Level of Assistance 1: Contact guard  Trials/Comments 1: Cues for hand placement  Transfers 2  Transfer From 2: Bed to  Transfer to 2: Commode-standard  Technique 2: Stand pivot  Transfer Device 2: Walker  Transfer Level of Assistance 2: Contact guard    Functional Mobility:  Functional Mobility  Functional Mobility Performed: Yes  Functional Mobility 1  Surface 1: Level tile  Device 1: Rolling walker  Assistance 1: Contact guard  Comments 1: Functional mobility completed within household distance w/FWW and CGA. No noted LOB at this time and able to navigate around obstacles w/cues       Therapy/Activity:      Therapeutic Activity  Therapeutic Activity Performed: Yes  Therapeutic Activity 1: Pt participated in dynamic standing task to complete ADLs and functional transfers/mobility to increase strength and tolerance.    Outcome Measures:  [facility] Daily Activity  Putting on and taking off regular lower body clothing: A little  Bathing (including washing, rinsing, drying): A little  Putting on and taking off regular upper body clothing: A little  Toileting, which includes using toilet, bedpan or urinal: A little  Taking care of personal grooming such as brushing teeth: A little  Eating Meals: A  little  Daily Activity - Total Score: 18        Education Documentation  Body Mechanics, taught by ISH Aranda at 5/27/2025 12:23 PM.  Learner: Patient  Readiness: Acceptance  Method: Explanation  Response: Needs Reinforcement    ADL Training, taught by ISH Aranda at 5/27/2025 12:23 PM.  Learner: Patient  Readiness: Acceptance  Method: Explanation  Response: Needs Reinforcement    Education Comments  No comments found.      Goals:  Encounter Problems       Encounter Problems (Active)       ADLs       Patient will perform UB and LB bathing with modified independent level of assistance. (Progressing)       Start:  05/23/25    Expected End:  06/06/25               BALANCE       Patient will tolerate standing for 10 minutes to modified independent level of assistance with least restrictive device in order to improve functional activity tolerance for ADL tasks. (Progressing)       Start:  05/23/25    Expected End:  06/06/25               EXERCISE/STRENGTHENING       Patient with increase RUE by 1/2 MMT grade strength. (Progressing)       Start:  05/23/25    Expected End:  06/06/25            Patient will progress RUE coordination to WFL (Progressing)       Start:  05/23/25    Expected End:  06/06/25               MOBILITY       Patient will perform Functional mobility max Household distances/Community Distances with modified independent level of assistance and least restrictive device in order to improve safety and functional mobility. (Progressing)       Start:  05/23/25    Expected End:  06/06/25               VISION       Patient will navigate environments with high visual stimuli safely Without loss of balance and Attending to obstacles with modified independent level of assistance. (Progressing)       Start:  05/23/25    Expected End:  06/06/25

## 2025-05-28 VITALS
SYSTOLIC BLOOD PRESSURE: 138 MMHG | BODY MASS INDEX: 26.13 KG/M2 | WEIGHT: 147.49 LBS | DIASTOLIC BLOOD PRESSURE: 77 MMHG | HEART RATE: 94 BPM | RESPIRATION RATE: 19 BRPM | OXYGEN SATURATION: 96 % | TEMPERATURE: 97 F | HEIGHT: 63 IN

## 2025-05-28 LAB — GLUCOSE BLD MANUAL STRIP-MCNC: 111 MG/DL (ref 74–99)

## 2025-05-28 PROCEDURE — 2500000001 HC RX 250 WO HCPCS SELF ADMINISTERED DRUGS (ALT 637 FOR MEDICARE OP): Performed by: INTERNAL MEDICINE

## 2025-05-28 PROCEDURE — 2500000001 HC RX 250 WO HCPCS SELF ADMINISTERED DRUGS (ALT 637 FOR MEDICARE OP)

## 2025-05-28 PROCEDURE — 82947 ASSAY GLUCOSE BLOOD QUANT: CPT

## 2025-05-28 RX ADMIN — METOPROLOL SUCCINATE 50 MG: 50 TABLET, EXTENDED RELEASE ORAL at 08:39

## 2025-05-28 RX ADMIN — LISINOPRIL 40 MG: 20 TABLET ORAL at 08:39

## 2025-05-28 RX ADMIN — ASPIRIN 81 MG: 81 TABLET, COATED ORAL at 08:39

## 2025-05-28 RX ADMIN — CEPHALEXIN 500 MG: 500 CAPSULE ORAL at 06:23

## 2025-05-28 RX ADMIN — METOPROLOL SUCCINATE 25 MG: 50 TABLET, EXTENDED RELEASE ORAL at 08:39

## 2025-05-28 RX ADMIN — PANTOPRAZOLE SODIUM 40 MG: 40 TABLET, DELAYED RELEASE ORAL at 06:24

## 2025-05-28 RX ADMIN — CLOPIDOGREL 75 MG: 75 TABLET ORAL at 08:39

## 2025-05-28 ASSESSMENT — PAIN - FUNCTIONAL ASSESSMENT
PAIN_FUNCTIONAL_ASSESSMENT: 0-10
PAIN_FUNCTIONAL_ASSESSMENT: 0-10

## 2025-05-28 ASSESSMENT — PAIN SCALES - GENERAL
PAINLEVEL_OUTOF10: 0 - NO PAIN

## 2025-05-28 NOTE — CARE PLAN
The patient's goals for the shift include  pt will be HDS throughout shift.    The clinical goals for the shift include no falls    Over the shift, the patient did make progress toward the following goals. Barriers to progression include understanding. Recommendations to address these barriers include education.

## 2025-05-28 NOTE — CARE PLAN
The patient's goals for the shift include  pt will rest comfortably through night.    The clinical goals for the shift include no falls    Over the shift, the patient did make progress toward the following goals. Barriers to progression include education. Recommendations to address these barriers include education reinforcement.

## 2025-06-05 ENCOUNTER — APPOINTMENT (OUTPATIENT)
Dept: PHARMACY | Facility: HOSPITAL | Age: OVER 89
End: 2025-06-05
Payer: MEDICARE

## 2025-06-20 ENCOUNTER — TELEMEDICINE (OUTPATIENT)
Dept: NEUROLOGY | Facility: HOSPITAL | Age: OVER 89
End: 2025-06-20
Payer: MEDICARE

## 2025-06-20 ENCOUNTER — PATIENT OUTREACH (OUTPATIENT)
Dept: PRIMARY CARE | Facility: CLINIC | Age: OVER 89
End: 2025-06-20
Payer: MEDICARE

## 2025-06-20 ENCOUNTER — TELEPHONE (OUTPATIENT)
Dept: PRIMARY CARE | Facility: CLINIC | Age: OVER 89
End: 2025-06-20
Payer: MEDICARE

## 2025-06-20 DIAGNOSIS — Z91.81 AT RISK FOR FALLS: ICD-10-CM

## 2025-06-20 DIAGNOSIS — S06.5X9S TRAUMATIC SUBDURAL HEMATOMA WITH LOSS OF CONSCIOUSNESS, SEQUELA: ICD-10-CM

## 2025-06-20 DIAGNOSIS — Z86.73 HISTORY OF STROKE: Primary | ICD-10-CM

## 2025-06-20 DIAGNOSIS — I48.0 AF (PAROXYSMAL ATRIAL FIBRILLATION) (MULTI): ICD-10-CM

## 2025-06-20 NOTE — TELEPHONE ENCOUNTER
Mamie from home health called regarding this patient wants to know if Provider will follow for home care orders.     She stated they do not fax until they know if the provider will and then they will fax things.   Mamie would like a call with a response at [phone]

## 2025-06-20 NOTE — PATIENT INSTRUCTIONS
Continue clopidogrel 75mg daily  Change ASA to 81mg daily  DAPT x90 days total (up to 8/21/25). After 90 days, stop ASA and continue clopidogrel only  ? Watchman procedure vs being on AC c/o cardiology  Continue atorvastatin  6.   Continue control vascular risk factors    Rtc as needed in gen neuro clinic

## 2025-06-20 NOTE — PROGRESS NOTES
Telehealth visit    Visit type: provider referral: Dr Mcdonald    PCP: David Mcdonald MD.    Subjective     Danika Demarco is a 89 y.o. year old right handed female who presents with Stroke.    Patient is accompanied by daughter.     Telehealth visit today. The patient was informed about the telehealth clinical encounter including benefits to avoiding travel, limitations of the assessment, and billing for the service. In-office care may be recommended if needed. Telehealth sessions are not being recorded and personal health information is protected. All questions were answered and verbal consent from the patient (or guardian) was obtained to proceed. Patient verbally confirmed, patient physically in [place].     HPI    Here for stroke. Hosp followup. Hospitalized at [facility] 5/21/25-5/27/25. Seen by Dr Bee and Carmela Mansfield CNP.    Presented with aphasia, R-sided plegia. Found to have L M1/M2 occlusion. IV TNK was given despite remote hx subdural hematoma requiring evacuation. On ASA 325mg daily at the time, no statin. Advised do DAPT (ASA 81mg daily + clopidogrel) x90 days, then plavix only. No AC due to hx SDH. Advised followup.    5/21/25 TTE mildly dilated FRENCH, negative bubble    Here for followup.    States she is doing well. On ASA 325mg daily, on clopidogrel, on cholesterol pill. Speech back to normal. R leg still somewhat not normal. Walks with walker all the time. Was walking with walker part time prior to stroke. No problems with eating/choking    Has afib. Fell in yard before 14-15 years ago (mechanical fall), had brain bleed. S/p surgery. During hospitalization, was apparently found to have afib (new dx then), but because of traumatic subdural hematoma was not started on AC. She has never been on AC for her afib.    Discussed about poss getting back on AC, pros and cons, vs other options.    Non-smoker, quit 70 years ago. No alcohol or street drugs.      Review of Systems   Constitutional:   Negative for appetite change, chills and fever.   HENT:  Negative for ear pain and nosebleeds.    Eyes:  Negative for discharge and itching.   Respiratory:  Negative for choking and chest tightness.    Cardiovascular:  Negative for chest pain and palpitations.   Gastrointestinal:  Negative for abdominal distention, abdominal pain and nausea.   Endocrine: Negative for cold intolerance and heat intolerance.   Genitourinary:  Negative for difficulty urinating and dysuria.   Musculoskeletal:  Negative for gait problem and myalgias.   Neurological:  Negative for dizziness, seizures and numbness.     Problem List[1]    Medical History[2]  Surgical History[3]  Social History     Tobacco Use    Smoking status: Never     Passive exposure: Never    Smokeless tobacco: Never   Substance Use Topics    Alcohol use: Yes     Comment: occasionally     family history includes Breast cancer in her sister; Diabetes in her brother; Prostate cancer in her brother.    Allergies[4]  Current Medications[5]    Objective     Vital Signs:  None--telehealth visit    Awake, alert, oriented x3, in no distress  Well-nourished, seated    Mental status exam as above, conversant   Fair fund of knowledge  Recent/remote memory fair  Fair attention span  Not following with EOMs, no nystagmus, no ptosis   No facial droop   Hearing grossly intact   No dysarthria    Motor strength is at least antigravity on all extremities   Finger to nose test intact bilaterally   (+) Romberg  I did not have her walk      Lab Results   Component Value Date    WBC 9.9 05/26/2025    RBC 4.06 05/26/2025    HGB 13.1 05/26/2025    HCT 39.6 05/26/2025     05/26/2025     05/26/2025    K 3.5 05/26/2025     (H) 05/26/2025    BUN 18 05/26/2025    CREATININE 0.48 (L) 05/26/2025    EGFR 88 05/26/2025    CALCIUM 8.0 (L) 05/26/2025    ALKPHOS 56 05/22/2025    AST 21 05/22/2025    ALT 13 05/22/2025    MG 1.68 05/23/2025    [id number] 384 01/31/2025    VITD25 23 (L)  01/31/2025    HGBA1C 5.5 05/22/2025    LDLDIRECT 39 08/12/2022    LDLCALC 24 05/22/2025    CHOL 79 05/22/2025    HDL 33.8 05/22/2025    TRIG 107 05/22/2025    TSH 2.30 01/31/2025        MR brain wo IV contrast 05/22/2025    Narrative  Interpreted By:  Sergio Hogan,  STUDY:  MR BRAIN WO IV CONTRAST;  5/22/2025 3:30 pm    INDICATION:  Signs/Symptoms:stroke s/p TNK.      COMPARISON:  Head CT of May 21st 2025    ACCESSION NUMBER(S):  [id number]    ORDERING CLINICIAN:  STEPHANIE AGUIRRE    TECHNIQUE:  Multiplaner, multisequence MRI were obtained without contrast    FINDINGS:  Parenchyma: Cortical restricted diffusion is present within the left  frontal lobe. There is a background of age-related volume loss and  presumed ischemic white matter demyelination.    Ventricles: There is no hydrocephalus.    Extra-axial spaces: No abnormal extra-axial fluid collection. Basal  cisterns are patent.    Vessels: T2 flow voids are maintained.    Orbits:  The patient is status post bilateral lens surgery.    Paranasal sinuses and mastoid air cells: No fluid levels are present.    Skull: There is normal T1 marrow signal without evidence of  aggressive lesion.    Soft tissues: Unremarkable    Impression  1. Cortical restricted diffusion, consistent with ischemia, is  present within the left frontal lobe.  2. There is a background of age-related volume loss, atherosclerotic  disease, and ischemic white matter demyelination.    MACRO:  Critical Finding:  See findings. Notification was initiated on  5/22/2025 at 3:41 pm by  Sergio Hogan.  (**-OCF-**)    Signed by: Sergio Hogan 5/22/2025 3:41 PM  Dictation workstation:   [id number]      Assessment/Plan     Acute ischemic stroke, L frontal lobe 5/2025  Left M1/M2 occlusion 5/2025  S/p IV TNK 5/2025  Minimal symptoms now  Stroke pathophysiology explained to patient. Not classic multiple embolic infarcts for cardioembolic etiology, though still possible as this is cortical and distal. CTA  brain/neck otherwise clear except for L M1/M2 occlusion found.  Was on ASA 325mg daily  Switched to ASA 81mg daily + clopidogrel 75mg daily during hospitalization  Currently still taking ASA 325mg daily + clopidogrel 75mg daily  On atorvastatin    4. At risk for falls  5. Afib, never been on anticoagulation due to hx traumatic SDH  6. Hx traumatic SDH  Discussed with pt, ideally with afib and this stroke would need to be on anticoagulation  HOWEVER, patient is imbalance (now) and is at risk of falls. Moreover, pt has never been put on AC in the past due to his hx traumatic SDH  Pros and cons of anticoagulation need to be weighed vis a vis risk of recurrent stroke (pt has not had a stroke for 15 years being off AC)  Discussed possible other options including consideration of Watchman procedure c/o cardiology  Advised them to discuss issue of AC, falls risk and possible Watchman with cardiology    Plans:  Continue clopidogrel 75mg daily  Change ASA to 81mg daily  DAPT x90 days total (up to 8/21/25). After 90 days, stop ASA and continue clopidogrel only  ? Watchman procedure vs being on AC c/o cardiology  Continue atorvastatin  6.   Continue control vascular risk factors    Rtc as needed in gen neuro clinic    All questions were answered.  Pt knows how to contact my office in case pt has any questions or concerns.    Edward Mckeon MD              [1]   Patient Active Problem List  Diagnosis    Allergic rhinitis    Anxiety    Aortic stenosis, mild    AF (paroxysmal atrial fibrillation) (Multi)    Cataract    Colitis, ulcerative (Multi)    Controlled type 2 diabetes mellitus (Multi)    Dizziness    Dysrhythmia    Essential hypertension    Hyperlipidemia    Hypertension, accelerated    Mild depression    Mitral valve regurgitation    OAB (overactive bladder)    Psoriasis    Hemangioma of skin and subcutaneous tissue    Nummular dermatitis    Other seborrheic keratosis    Rosacea, unspecified    Routine general medical  examination at health care facility    Elevated liver enzymes    History of subdural hematoma    Post-menopausal osteoporosis    Urinary tract infection    Cerebrovascular accident (CVA), unspecified mechanism (Multi)   [2]   Past Medical History:  Diagnosis Date    Abnormal levels of other serum enzymes     Elevated liver enzymes    Personal history of other (healed) physical injury and trauma     History of traumatic subdural hematoma    Personal history of other diseases of urinary system     History of hematuria    Personal history of urinary (tract) infections     History of urinary tract infection    Urinary bladder incontinence 09/25/2023   [3]   Past Surgical History:  Procedure Laterality Date    OTHER SURGICAL HISTORY  01/21/2020    Bladder surgery    OTHER SURGICAL HISTORY  01/21/2020    Hysterectomy    OTHER SURGICAL HISTORY  01/21/2020    Craniotomy    OTHER SURGICAL HISTORY  01/21/2020    Varicose vein ligation    OTHER SURGICAL HISTORY  01/21/2020    Cholecystectomy    OTHER SURGICAL HISTORY  01/21/2020    Hernia repair    OTHER SURGICAL HISTORY  01/21/2020    Oophorectomy bilateral   [4] No Known Allergies  [5]   Current Outpatient Medications:     aspirin 325 mg tablet, Take 1 tablet (325 mg) by mouth once daily., Disp: , Rfl:     atorvastatin (Lipitor) 40 mg tablet, Take 1 tablet (40 mg) by mouth once daily at bedtime., Disp: , Rfl:     clopidogrel (Plavix) 75 mg tablet, Take 1 tablet (75 mg) by mouth once daily., Disp: , Rfl:     lisinopril 40 mg tablet, Take 1 tablet by mouth once daily, Disp: 90 tablet, Rfl: 1    metoprolol succinate XL (Toprol-XL) 25 mg 24 hr tablet, Take 1 tablet (25 mg) by mouth once daily. Do not crush or chew., Disp: , Rfl:     metoprolol succinate XL (Toprol-XL) 25 mg 24 hr tablet, Take 1 tablet (25 mg) by mouth once daily. Do not crush or chew., Disp: , Rfl:     metoprolol succinate XL (Toprol-XL) 50 mg 24 hr tablet, TAKE 1 TABLET BY MOUTH ONCE DAILY WITH  25MG  TAB  TO   TOTAL  75MG, Disp: 90 tablet, Rfl: 3    vibegron (Gemtesa) 75 mg tablet, Take 1 tablet (75 mg) by mouth once daily., Disp: 90 tablet, Rfl: 3    aspirin 81 mg EC tablet, Take 1 tablet (81 mg) by mouth once daily. Take for 90 days then STOP (Patient not taking: Reported on 6/20/2025), Disp: 30 tablet, Rfl: 2

## 2025-06-20 NOTE — LETTER
June 21, 2025     David Mcdonald MD  [address] [address]  [facility] [address]    Patient: Danika Demarco   YOB: 1930   Date of Visit: 6/20/2025       Dear Dr. David Mcdonald MD:    Thank you for referring Danika Demarco to me for evaluation. Below are my notes for this consultation.  If you have questions, please do not hesitate to call me. I look forward to following your patient along with you.       Sincerely,     Edward Mckeon MD      CC: Milton Ashby MD  ______________________________________________________________________________________    Telehealth visit    Visit type: provider referral: Dr Mcdonald    PCP: David Mcdonald MD.    Subjective    Danika Demarco is a 89 y.o. year old right handed female who presents with Stroke.    Patient is accompanied by daughter.     Telehealth visit today. The patient was informed about the telehealth clinical encounter including benefits to avoiding travel, limitations of the assessment, and billing for the service. In-office care may be recommended if needed. Telehealth sessions are not being recorded and personal health information is protected. All questions were answered and verbal consent from the patient (or guardian) was obtained to proceed. Patient verbally confirmed, patient physically in [place].     HPI    Here for stroke. Hosp followup. Hospitalized at [facility] 5/21/25-5/27/25. Seen by Dr Bee and Carmela Mansfield CNP.    Presented with aphasia, R-sided plegia. Found to have L M1/M2 occlusion. IV TNK was given despite remote hx subdural hematoma requiring evacuation. On ASA 325mg daily at the time, no statin. Advised do DAPT (ASA 81mg daily + clopidogrel) x90 days, then plavix only. No AC due to hx SDH. Advised followup.    5/21/25 TTE mildly dilated FRENCH, negative bubble    Here for followup.    States she is doing well. On ASA 325mg daily, on clopidogrel, on cholesterol pill. Speech back to normal. R leg still somewhat not normal. Walks  with walker all the time. Was walking with walker part time prior to stroke. No problems with eating/choking    Has afib. Fell in yard before 14-15 years ago (mechanical fall), had brain bleed. S/p surgery. During hospitalization, was apparently found to have afib (new dx then), but because of traumatic subdural hematoma was not started on AC. She has never been on AC for her afib.    Discussed about poss getting back on AC, pros and cons, vs other options.    Non-smoker, quit 70 years ago. No alcohol or street drugs.      Review of Systems   Constitutional:  Negative for appetite change, chills and fever.   HENT:  Negative for ear pain and nosebleeds.    Eyes:  Negative for discharge and itching.   Respiratory:  Negative for choking and chest tightness.    Cardiovascular:  Negative for chest pain and palpitations.   Gastrointestinal:  Negative for abdominal distention, abdominal pain and nausea.   Endocrine: Negative for cold intolerance and heat intolerance.   Genitourinary:  Negative for difficulty urinating and dysuria.   Musculoskeletal:  Negative for gait problem and myalgias.   Neurological:  Negative for dizziness, seizures and numbness.     Problem List[1]    Medical History[2]  Surgical History[3]  Social History     Tobacco Use   • Smoking status: Never     Passive exposure: Never   • Smokeless tobacco: Never   Substance Use Topics   • Alcohol use: Yes     Comment: occasionally     family history includes Breast cancer in her sister; Diabetes in her brother; Prostate cancer in her brother.    Allergies[4]  Current Medications[5]    Objective    Vital Signs:  None--telehealth visit    Awake, alert, oriented x3, in no distress  Well-nourished, seated    Mental status exam as above, conversant   Fair fund of knowledge  Recent/remote memory fair  Fair attention span  Not following with EOMs, no nystagmus, no ptosis   No facial droop   Hearing grossly intact   No dysarthria    Motor strength is at least  antigravity on all extremities   Finger to nose test intact bilaterally   (+) Romberg  I did not have her walk      Lab Results   Component Value Date    WBC 9.9 05/26/2025    RBC 4.06 05/26/2025    HGB 13.1 05/26/2025    HCT 39.6 05/26/2025     05/26/2025     05/26/2025    K 3.5 05/26/2025     (H) 05/26/2025    BUN 18 05/26/2025    CREATININE 0.48 (L) 05/26/2025    EGFR 88 05/26/2025    CALCIUM 8.0 (L) 05/26/2025    ALKPHOS 56 05/22/2025    AST 21 05/22/2025    ALT 13 05/22/2025    MG 1.68 05/23/2025    [id number] 384 01/31/2025    VITD25 23 (L) 01/31/2025    HGBA1C 5.5 05/22/2025    LDLDIRECT 39 08/12/2022    LDLCALC 24 05/22/2025    CHOL 79 05/22/2025    HDL 33.8 05/22/2025    TRIG 107 05/22/2025    TSH 2.30 01/31/2025        MR brain wo IV contrast 05/22/2025    Narrative  Interpreted By:  Sergio Hogan,  STUDY:  MR BRAIN WO IV CONTRAST;  5/22/2025 3:30 pm    INDICATION:  Signs/Symptoms:stroke s/p TNK.      COMPARISON:  Head CT of May 21st 2025    ACCESSION NUMBER(S):  [id number]    ORDERING CLINICIAN:  STEPHANIE AGUIRRE    TECHNIQUE:  Multiplaner, multisequence MRI were obtained without contrast    FINDINGS:  Parenchyma: Cortical restricted diffusion is present within the left  frontal lobe. There is a background of age-related volume loss and  presumed ischemic white matter demyelination.    Ventricles: There is no hydrocephalus.    Extra-axial spaces: No abnormal extra-axial fluid collection. Basal  cisterns are patent.    Vessels: T2 flow voids are maintained.    Orbits:  The patient is status post bilateral lens surgery.    Paranasal sinuses and mastoid air cells: No fluid levels are present.    Skull: There is normal T1 marrow signal without evidence of  aggressive lesion.    Soft tissues: Unremarkable    Impression  1. Cortical restricted diffusion, consistent with ischemia, is  present within the left frontal lobe.  2. There is a background of age-related volume loss,  atherosclerotic  disease, and ischemic white matter demyelination.    MACRO:  Critical Finding:  See findings. Notification was initiated on  5/22/2025 at 3:41 pm by  Sergio Hogan.  (**-OCF-**)    Signed by: Sergio Hogan 5/22/2025 3:41 PM  Dictation workstation:   [id number]      Assessment/Plan    Acute ischemic stroke, L frontal lobe 5/2025  Left M1/M2 occlusion 5/2025  S/p IV TNK 5/2025  Minimal symptoms now  Stroke pathophysiology explained to patient. Not classic multiple embolic infarcts for cardioembolic etiology, though still possible as this is cortical and distal. CTA brain/neck otherwise clear except for L M1/M2 occlusion found.  Was on ASA 325mg daily  Switched to ASA 81mg daily + clopidogrel 75mg daily during hospitalization  Currently still taking ASA 325mg daily + clopidogrel 75mg daily  On atorvastatin    4. At risk for falls  5. Afib, never been on anticoagulation due to hx traumatic SDH  6. Hx traumatic SDH  Discussed with pt, ideally with afib and this stroke would need to be on anticoagulation  HOWEVER, patient is imbalance (now) and is at risk of falls. Moreover, pt has never been put on AC in the past due to his hx traumatic SDH  Pros and cons of anticoagulation need to be weighed vis a vis risk of recurrent stroke (pt has not had a stroke for 15 years being off AC)  Discussed possible other options including consideration of Watchman procedure c/o cardiology  Advised them to discuss issue of AC, falls risk and possible Watchman with cardiology    Plans:  Continue clopidogrel 75mg daily  Change ASA to 81mg daily  DAPT x90 days total (up to 8/21/25). After 90 days, stop ASA and continue clopidogrel only  ? Watchman procedure vs being on AC c/o cardiology  Continue atorvastatin  6.   Continue control vascular risk factors    Rtc as needed in gen neuro clinic    All questions were answered.  Pt knows how to contact my office in case pt has any questions or concerns.    Edward Mckeon MD               [1]  Patient Active Problem List  Diagnosis   • Allergic rhinitis   • Anxiety   • Aortic stenosis, mild   • AF (paroxysmal atrial fibrillation) (Multi)   • Cataract   • Colitis, ulcerative (Multi)   • Controlled type 2 diabetes mellitus (Multi)   • Dizziness   • Dysrhythmia   • Essential hypertension   • Hyperlipidemia   • Hypertension, accelerated   • Mild depression   • Mitral valve regurgitation   • OAB (overactive bladder)   • Psoriasis   • Hemangioma of skin and subcutaneous tissue   • Nummular dermatitis   • Other seborrheic keratosis   • Rosacea, unspecified   • Routine general medical examination at health care facility   • Elevated liver enzymes   • History of subdural hematoma   • Post-menopausal osteoporosis   • Urinary tract infection   • Cerebrovascular accident (CVA), unspecified mechanism (Multi)   [2]  Past Medical History:  Diagnosis Date   • Abnormal levels of other serum enzymes     Elevated liver enzymes   • Personal history of other (healed) physical injury and trauma     History of traumatic subdural hematoma   • Personal history of other diseases of urinary system     History of hematuria   • Personal history of urinary (tract) infections     History of urinary tract infection   • Urinary bladder incontinence 09/25/2023   [3]  Past Surgical History:  Procedure Laterality Date   • OTHER SURGICAL HISTORY  01/21/2020    Bladder surgery   • OTHER SURGICAL HISTORY  01/21/2020    Hysterectomy   • OTHER SURGICAL HISTORY  01/21/2020    Craniotomy   • OTHER SURGICAL HISTORY  01/21/2020    Varicose vein ligation   • OTHER SURGICAL HISTORY  01/21/2020    Cholecystectomy   • OTHER SURGICAL HISTORY  01/21/2020    Hernia repair   • OTHER SURGICAL HISTORY  01/21/2020    Oophorectomy bilateral   [4]  No Known Allergies  [5]    Current Outpatient Medications:   •  aspirin 325 mg tablet, Take 1 tablet (325 mg) by mouth once daily., Disp: , Rfl:   •  atorvastatin (Lipitor) 40 mg tablet, Take 1 tablet  (40 mg) by mouth once daily at bedtime., Disp: , Rfl:   •  clopidogrel (Plavix) 75 mg tablet, Take 1 tablet (75 mg) by mouth once daily., Disp: , Rfl:   •  lisinopril 40 mg tablet, Take 1 tablet by mouth once daily, Disp: 90 tablet, Rfl: 1  •  metoprolol succinate XL (Toprol-XL) 25 mg 24 hr tablet, Take 1 tablet (25 mg) by mouth once daily. Do not crush or chew., Disp: , Rfl:   •  metoprolol succinate XL (Toprol-XL) 25 mg 24 hr tablet, Take 1 tablet (25 mg) by mouth once daily. Do not crush or chew., Disp: , Rfl:   •  metoprolol succinate XL (Toprol-XL) 50 mg 24 hr tablet, TAKE 1 TABLET BY MOUTH ONCE DAILY WITH  25MG  TAB  TO  TOTAL  75MG, Disp: 90 tablet, Rfl: 3  •  vibegron (Gemtesa) 75 mg tablet, Take 1 tablet (75 mg) by mouth once daily., Disp: 90 tablet, Rfl: 3  •  aspirin 81 mg EC tablet, Take 1 tablet (81 mg) by mouth once daily. Take for 90 days then STOP (Patient not taking: Reported on 6/20/2025), Disp: 30 tablet, Rfl: 2       [1]  Patient Active Problem List  Diagnosis   • Allergic rhinitis   • Anxiety   • Aortic stenosis, mild   • AF (paroxysmal atrial fibrillation) (Multi)   • Cataract   • Colitis, ulcerative (Multi)   • Controlled type 2 diabetes mellitus (Multi)   • Dizziness   • Dysrhythmia   • Essential hypertension   • Hyperlipidemia   • Hypertension, accelerated   • Mild depression   • Mitral valve regurgitation   • OAB (overactive bladder)   • Psoriasis   • Hemangioma of skin and subcutaneous tissue   • Nummular dermatitis   • Other seborrheic keratosis   • Rosacea, unspecified   • Routine general medical examination at health care facility   • Elevated liver enzymes   • History of subdural hematoma   • Post-menopausal osteoporosis   • Urinary tract infection   • Cerebrovascular accident (CVA), unspecified mechanism (Multi)   [2]  Past Medical History:  Diagnosis Date   • Abnormal levels of other serum enzymes     Elevated liver enzymes   • Personal history of other (healed) physical injury and  trauma     History of traumatic subdural hematoma   • Personal history of other diseases of urinary system     History of hematuria   • Personal history of urinary (tract) infections     History of urinary tract infection   • Urinary bladder incontinence 09/25/2023   [3]  Past Surgical History:  Procedure Laterality Date   • OTHER SURGICAL HISTORY  01/21/2020    Bladder surgery   • OTHER SURGICAL HISTORY  01/21/2020    Hysterectomy   • OTHER SURGICAL HISTORY  01/21/2020    Craniotomy   • OTHER SURGICAL HISTORY  01/21/2020    Varicose vein ligation   • OTHER SURGICAL HISTORY  01/21/2020    Cholecystectomy   • OTHER SURGICAL HISTORY  01/21/2020    Hernia repair   • OTHER SURGICAL HISTORY  01/21/2020    Oophorectomy bilateral   [4]  No Known Allergies  [5]    Current Outpatient Medications:   •  aspirin 325 mg tablet, Take 1 tablet (325 mg) by mouth once daily., Disp: , Rfl:   •  atorvastatin (Lipitor) 40 mg tablet, Take 1 tablet (40 mg) by mouth once daily at bedtime., Disp: , Rfl:   •  clopidogrel (Plavix) 75 mg tablet, Take 1 tablet (75 mg) by mouth once daily., Disp: , Rfl:   •  lisinopril 40 mg tablet, Take 1 tablet by mouth once daily, Disp: 90 tablet, Rfl: 1  •  metoprolol succinate XL (Toprol-XL) 25 mg 24 hr tablet, Take 1 tablet (25 mg) by mouth once daily. Do not crush or chew., Disp: , Rfl:   •  metoprolol succinate XL (Toprol-XL) 25 mg 24 hr tablet, Take 1 tablet (25 mg) by mouth once daily. Do not crush or chew., Disp: , Rfl:   •  metoprolol succinate XL (Toprol-XL) 50 mg 24 hr tablet, TAKE 1 TABLET BY MOUTH ONCE DAILY WITH  25MG  TAB  TO  TOTAL  75MG, Disp: 90 tablet, Rfl: 3  •  vibegron (Gemtesa) 75 mg tablet, Take 1 tablet (75 mg) by mouth once daily., Disp: 90 tablet, Rfl: 3  •  aspirin 81 mg EC tablet, Take 1 tablet (81 mg) by mouth once daily. Take for 90 days then STOP (Patient not taking: Reported on 6/20/2025), Disp: 30 tablet, Rfl: 2

## 2025-06-20 NOTE — PROGRESS NOTES
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 She reports the therapy she received at the [facility] was incredibly helpful. She is managing at home, using her walker. She has a daughter who assists, Monica. Patient requested PCP appt and medications be reviewed with daughter. Contacted Monica. She denies any further questions or concerns. Contact information provided. (6/20/2025 10:44 AM)    Medications  Medications reviewed with patient/caregiver?: Yes (6/20/2025 10:44 AM)  Is the patient having any side effects they believe may be caused by any medication additions or changes?: No (6/20/2025 10:44 AM)  Does the patient have all medications ordered at discharge?: No (6/20/2025 10:44 AM)  What is keeping the patient from filling the prescriptions?: Lost script/didn't receive (6/20/2025 10:44 AM)  Prescription Comments: patient and daughter report having all medications besides atorvastatin. (6/20/2025 10:44 AM)  Medication Comments: contacted FERMIN Fields at [facility], she is working to have atorvastatin script sent to [facility] as listed. Voicemail left with daughter Monica to notify. (6/20/2025 10:44 AM)    Appointments  Does the patient have a primary care provider?: Yes (6/20/2025 10:44 AM)  Care Management Interventions: Verified appointment date/time/provider (6/20/2025 10:44 AM)  Care Management Interventions: Advised to schedule with specialist (6/20/2025 10:44 AM)    Self Management  What is the home health agency?: patient is unsure if home care was ordered (6/20/2025 10:44 AM)  What Durable Medical Equipment  (DME) was ordered?: walker (6/20/2025 10:44 AM)  Has all Durable Medical Equipment (DME) been delivered?: Yes (6/20/2025 10:44 AM)    Patient Teaching  Does the patient have access to their discharge instructions?: Yes (6/20/2025 10:44 AM)  What is the patient's perception of their health status since discharge?: Improving (6/20/2025 10:44 AM)

## 2025-06-21 PROBLEM — Z86.73 HISTORY OF STROKE: Status: ACTIVE | Noted: 2025-06-21

## 2025-06-21 PROBLEM — Z91.81 AT RISK FOR FALLS: Status: ACTIVE | Noted: 2025-06-21

## 2025-06-21 PROBLEM — S06.5X9A: Status: ACTIVE | Noted: 2025-06-21

## 2025-06-21 RX ORDER — METOPROLOL SUCCINATE 25 MG/1
25 TABLET, EXTENDED RELEASE ORAL DAILY
COMMUNITY

## 2025-07-02 ENCOUNTER — APPOINTMENT (OUTPATIENT)
Dept: PRIMARY CARE | Facility: CLINIC | Age: OVER 89
End: 2025-07-02
Payer: MEDICARE

## 2025-07-02 VITALS
OXYGEN SATURATION: 99 % | BODY MASS INDEX: 26.13 KG/M2 | SYSTOLIC BLOOD PRESSURE: 122 MMHG | HEART RATE: 72 BPM | HEIGHT: 63 IN | RESPIRATION RATE: 16 BRPM | DIASTOLIC BLOOD PRESSURE: 72 MMHG | TEMPERATURE: 97 F

## 2025-07-02 DIAGNOSIS — S06.5X9S TRAUMATIC SUBDURAL HEMATOMA WITH LOSS OF CONSCIOUSNESS, SEQUELA: ICD-10-CM

## 2025-07-02 DIAGNOSIS — I63.9 CEREBROVASCULAR ACCIDENT (CVA), UNSPECIFIED MECHANISM (MULTI): Primary | ICD-10-CM

## 2025-07-02 DIAGNOSIS — K51.919 ULCERATIVE COLITIS WITH COMPLICATION, UNSPECIFIED LOCATION (MULTI): ICD-10-CM

## 2025-07-02 DIAGNOSIS — G47.9 SLEEP DISORDER: ICD-10-CM

## 2025-07-02 PROBLEM — K51.90 COLITIS, ULCERATIVE (MULTI): Status: RESOLVED | Noted: 2023-04-28 | Resolved: 2025-07-02

## 2025-07-02 PROBLEM — S06.5X9A: Status: RESOLVED | Noted: 2025-06-21 | Resolved: 2025-07-02

## 2025-07-02 PROCEDURE — 1126F AMNT PAIN NOTED NONE PRSNT: CPT | Performed by: INTERNAL MEDICINE

## 2025-07-02 PROCEDURE — 99495 TRANSJ CARE MGMT MOD F2F 14D: CPT | Performed by: INTERNAL MEDICINE

## 2025-07-02 PROCEDURE — 3078F DIAST BP <80 MM HG: CPT | Performed by: INTERNAL MEDICINE

## 2025-07-02 PROCEDURE — 1036F TOBACCO NON-USER: CPT | Performed by: INTERNAL MEDICINE

## 2025-07-02 PROCEDURE — 3074F SYST BP LT 130 MM HG: CPT | Performed by: INTERNAL MEDICINE

## 2025-07-02 PROCEDURE — 1159F MED LIST DOCD IN RCRD: CPT | Performed by: INTERNAL MEDICINE

## 2025-07-02 RX ORDER — CLOPIDOGREL BISULFATE 75 MG/1
75 TABLET ORAL DAILY
Qty: 90 TABLET | Refills: 2 | Status: SHIPPED | OUTPATIENT
Start: 2025-07-02

## 2025-07-02 RX ORDER — ATORVASTATIN CALCIUM 40 MG/1
40 TABLET, FILM COATED ORAL NIGHTLY
Qty: 90 TABLET | Refills: 2 | Status: SHIPPED | OUTPATIENT
Start: 2025-07-02

## 2025-07-02 SDOH — ECONOMIC STABILITY: FOOD INSECURITY: WITHIN THE PAST 12 MONTHS, YOU WORRIED THAT YOUR FOOD WOULD RUN OUT BEFORE YOU GOT MONEY TO BUY MORE.: NEVER TRUE

## 2025-07-02 SDOH — ECONOMIC STABILITY: FOOD INSECURITY: WITHIN THE PAST 12 MONTHS, THE FOOD YOU BOUGHT JUST DIDN'T LAST AND YOU DIDN'T HAVE MONEY TO GET MORE.: NEVER TRUE

## 2025-07-02 ASSESSMENT — ENCOUNTER SYMPTOMS
OCCASIONAL FEELINGS OF UNSTEADINESS: 1
LOSS OF SENSATION IN FEET: 0
DEPRESSION: 1

## 2025-07-02 ASSESSMENT — PAIN SCALES - GENERAL: PAINLEVEL_OUTOF10: 0-NO PAIN

## 2025-07-02 ASSESSMENT — LIFESTYLE VARIABLES
SKIP TO QUESTIONS 9-10: 1
AUDIT-C TOTAL SCORE: 0
HOW OFTEN DO YOU HAVE SIX OR MORE DRINKS ON ONE OCCASION: NEVER
HOW MANY STANDARD DRINKS CONTAINING ALCOHOL DO YOU HAVE ON A TYPICAL DAY: PATIENT DOES NOT DRINK
HOW OFTEN DO YOU HAVE A DRINK CONTAINING ALCOHOL: NEVER

## 2025-07-02 NOTE — PROGRESS NOTES
"          David Mcdonald MD Patient: Danika Demarco  : 1930  PCP: David Mcdonald MD  MRN: [id number]  Program: Transitional Care Management  Status: Enrolled  Effective Dates: 2025 - present  Responsible Staff: Kailey Meza RN  Social Drivers to be Addressed: Physical Activity, Social Connections, Stress         Danika Demarco is a 89 y.o. female presenting today for follow-up after being discharged from the skilled nursing facility 13 days ago. The main problem requiring admission was cerebral infarction due to thrombosis of left middle cerebral artery, she was treated with TPA and subsequently went to rehab . Patient was started on clopidogrel and low dose ASA. Patient saw neurology, she is take low dose ASA for 3 months, then continue clopidogrel only.  The discharge summary and/or Transitional Care Management documentation was reviewed. Medication reconciliation was performed as indicated via the \"[place] as Reviewed\" timestamp.     Danika Demarco was contacted by Transitional Care Management services two days after her discharge. This encounter and supporting documentation was reviewed. Patient received skilled nursing [facility]. She is doing much better now. Patient's  recently passed away also.    Patient is getting home PT now, , home VN. She walks with a walker    DM type 2: patient takes no med therapy, checks glucoses once daily, glucoses are generally well controlled, she checks home glucoses daily     HTN:  Takes lisinopril 40 mg daily and metoprolol 75 mg daily.  Patient used to have symptoms of vertigo, she has not had any symptoms .      Atrial fibrillation: Takes metoprolol 75 mg. Is due to follow up with Dr Ashby in October. She did have an episode of atrial fibrillation back in April which prompted an emergency room visit but no hospital admission. She  has a history of intracerebral bleed requiring drainage. She does not take other anticoagulant " "therapies except for clopidogrel  and low dose ASA currently     Hyperlipidemia: Stable, eats a lot of vegetables and salads. Takes atorvastatin 40 mg daily     Overactive bladder: patient now takes Gemtesa daily, patient sees Dr García, the med is very effective. Patient is very pleased with current treatment, she has been getting sample meds from Dr García, she is now getting meds through  Pharmacy.     Depression, mild: she takes no med therapy now,  patient has been having sleep issues since  passed away, she has been tired during the day       Review of Systems    otherwise negative aside from what was mentioned above in HPI.    /72 (BP Location: Right arm, Patient Position: Sitting, BP Cuff Size: Adult)   Pulse 72   Temp 36.1 °C (97 °F) (Temporal)   Resp 16   Ht 1.6 m (5' 3\")   SpO2 99%   BMI 26.13 kg/m²     Physical Exam    Constitutional: Well developed, awake/alert/oriented x3, no distress, alert and cooperative , accompanied by daughter  HEENT: auricles intact, no thyroid masses, no neck masses  Respiratory/Thorax: Patent airways, CTAB, normal breath sounds with good chest expansion, thorax symmetric   Cardiovascular: irregularly, irregular rate and rhythm, rate is controlled, a 2/6 systolic murmur is noted over the LUSB, radiating to the carotids, this is not new  Musculoskeletal: ROM intact, no joint swelling, she walks with walker  Extremities: normal extremities, no cyanosis, trace pitting of the left ankle only, a few varicosities are noted, no contusions or wounds, no clubbing   Psychological: Appropriate mood and behavior     The complexity of medical decision making for this patient's transitional care is moderate.    Assessment/Plan   Problem List Items Addressed This Visit           ICD-10-CM    Cerebrovascular accident (CVA), unspecified mechanism (Multi) - Primary I63.9    Patient was treated acutely, she now takes ASA and Clopidogrel, she was treated at rehab. She is now at " home, she has home VN ([facility]), she will stop ASA in 3 months. She now takes atorvastatin also         Relevant Medications    clopidogrel (Plavix) 75 mg tablet    atorvastatin (Lipitor) 40 mg tablet    Sleep disorder G47.9    Patient has psychophysiologic insomnia.  just passed away, trial of trazodone           Follow up for routine re assessment in 2-3 months, she is due for Medicare wellness exam also, 14 day TCM completed

## 2025-07-02 NOTE — ASSESSMENT & PLAN NOTE
Patient was treated acutely, she now takes ASA and Clopidogrel, she was treated at rehab. She is now at home, she has home VN ([facility]), she will stop ASA in 3 months. She now takes atorvastatin also

## 2025-07-07 DIAGNOSIS — N32.81 OAB (OVERACTIVE BLADDER): ICD-10-CM

## 2025-07-07 PROCEDURE — RXMED WILLOW AMBULATORY MEDICATION CHARGE

## 2025-07-07 RX ORDER — VIBEGRON 75 MG/1
75 TABLET, FILM COATED ORAL DAILY
Qty: 90 TABLET | Refills: 0 | Status: SHIPPED | OUTPATIENT
Start: 2025-07-07

## 2025-07-08 ENCOUNTER — PHARMACY VISIT (OUTPATIENT)
Dept: PHARMACY | Facility: CLINIC | Age: OVER 89
End: 2025-07-08
Payer: COMMERCIAL

## 2025-07-18 ENCOUNTER — PATIENT OUTREACH (OUTPATIENT)
Dept: PRIMARY CARE | Facility: CLINIC | Age: OVER 89
End: 2025-07-18
Payer: MEDICARE

## 2025-07-18 NOTE — PROGRESS NOTES
Confirmation of at least 2 patient identifiers.    Completed telephonic follow-up with patient after recent visit with Dr. Mcdonald    Spoke to patient during outreach call.    Patient reports feeling: Improved    Patient has questions or concerns about medications: No    Have all prescribed medications been filled? Yes    Patient has necessary resources to manage their care? Yes    Patient has questions or concerns? No    Next care management follow-up approximately within one month.  Care  information provided to patient.

## 2025-08-11 ENCOUNTER — TELEPHONE (OUTPATIENT)
Dept: PRIMARY CARE | Facility: CLINIC | Age: OVER 89
End: 2025-08-11
Payer: MEDICARE

## 2025-08-11 DIAGNOSIS — F51.02 ADJUSTMENT INSOMNIA: Primary | ICD-10-CM

## 2025-08-12 RX ORDER — TRAZODONE HYDROCHLORIDE 50 MG/1
50 TABLET ORAL NIGHTLY PRN
Qty: 30 TABLET | Refills: 0 | Status: SHIPPED | OUTPATIENT
Start: 2025-08-12 | End: 2026-08-12

## 2025-08-15 ENCOUNTER — PATIENT OUTREACH (OUTPATIENT)
Dept: PRIMARY CARE | Facility: CLINIC | Age: OVER 89
End: 2025-08-15
Payer: MEDICARE

## 2025-08-25 ENCOUNTER — APPOINTMENT (OUTPATIENT)
Dept: RADIOLOGY | Facility: HOSPITAL | Age: OVER 89
End: 2025-08-25
Payer: MEDICARE

## 2025-08-25 ENCOUNTER — HOSPITAL ENCOUNTER (EMERGENCY)
Facility: HOSPITAL | Age: OVER 89
Discharge: HOME | End: 2025-08-25
Attending: EMERGENCY MEDICINE
Payer: MEDICARE

## 2025-08-25 VITALS
HEART RATE: 98 BPM | DIASTOLIC BLOOD PRESSURE: 78 MMHG | OXYGEN SATURATION: 98 % | RESPIRATION RATE: 18 BRPM | HEIGHT: 62 IN | WEIGHT: 128 LBS | TEMPERATURE: 98.2 F | SYSTOLIC BLOOD PRESSURE: 122 MMHG | BODY MASS INDEX: 23.55 KG/M2

## 2025-08-25 DIAGNOSIS — S32.591A CLOSED FRACTURE OF RIGHT INFERIOR PUBIC RAMUS, INITIAL ENCOUNTER: ICD-10-CM
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
## 2025-08-25 PROCEDURE — 73502 X-RAY EXAM HIP UNI 2-3 VIEWS: CPT | Mod: RT

## 2025-08-25 PROCEDURE — 2500000001 HC RX 250 WO HCPCS SELF ADMINISTERED DRUGS (ALT 637 FOR MEDICARE OP): Performed by: EMERGENCY MEDICINE

## 2025-08-25 PROCEDURE — 99284 EMERGENCY DEPT VISIT MOD MDM: CPT | Performed by: EMERGENCY MEDICINE

## 2025-08-25 PROCEDURE — 72100 X-RAY EXAM L-S SPINE 2/3 VWS: CPT

## 2025-08-25 PROCEDURE — 72100 X-RAY EXAM L-S SPINE 2/3 VWS: CPT | Performed by: RADIOLOGY

## 2025-08-25 PROCEDURE — 73502 X-RAY EXAM HIP UNI 2-3 VIEWS: CPT | Mod: RIGHT SIDE | Performed by: RADIOLOGY

## 2025-08-25 RX ORDER — ACETAMINOPHEN 325 MG/1
975 TABLET ORAL ONCE
Status: COMPLETED | OUTPATIENT
Start: 2025-08-25 | End: 2025-08-25

## 2025-08-25 RX ADMIN — ACETAMINOPHEN 975 MG: 325 TABLET ORAL at 12:22

## 2025-08-25 ASSESSMENT — PAIN DESCRIPTION - PAIN TYPE: TYPE: OTHER (COMMENT)

## 2025-08-25 ASSESSMENT — PAIN - FUNCTIONAL ASSESSMENT
PAIN_FUNCTIONAL_ASSESSMENT: 0-10
PAIN_FUNCTIONAL_ASSESSMENT: 0-10

## 2025-08-25 ASSESSMENT — PAIN DESCRIPTION - LOCATION: LOCATION: BACK

## 2025-08-25 ASSESSMENT — LIFESTYLE VARIABLES
TOTAL SCORE: 0
EVER HAD A DRINK FIRST THING IN THE MORNING TO STEADY YOUR NERVES TO GET RID OF A HANGOVER: NO
HAVE YOU EVER FELT YOU SHOULD CUT DOWN ON YOUR DRINKING: NO
EVER FELT BAD OR GUILTY ABOUT YOUR DRINKING: NO
HAVE PEOPLE ANNOYED YOU BY CRITICIZING YOUR DRINKING: NO

## 2025-08-25 ASSESSMENT — PAIN DESCRIPTION - DESCRIPTORS: DESCRIPTORS: ACHING;DISCOMFORT

## 2025-08-25 ASSESSMENT — PAIN SCALES - GENERAL
PAINLEVEL_OUTOF10: 9
PAINLEVEL_OUTOF10: 0 - NO PAIN
PAINLEVEL_OUTOF10: 9

## 2025-08-25 ASSESSMENT — PAIN DESCRIPTION - ORIENTATION: ORIENTATION: RIGHT

## 2025-08-26 ENCOUNTER — TELEPHONE (OUTPATIENT)
Dept: PRIMARY CARE | Facility: CLINIC | Age: OVER 89
End: 2025-08-26
Payer: MEDICARE

## 2025-08-26 DIAGNOSIS — S32.599S CLOSED FRACTURE OF PUBIC RAMUS, UNSPECIFIED LATERALITY, SEQUELA: Primary | ICD-10-CM

## 2025-08-26 PROBLEM — S32.599A: Status: ACTIVE | Noted: 2025-08-26

## 2025-08-26 RX ORDER — MELOXICAM 7.5 MG/1
7.5 TABLET ORAL 2 TIMES DAILY PRN
Qty: 60 TABLET | Refills: 1 | Status: SHIPPED | OUTPATIENT
Start: 2025-08-26 | End: 2026-08-26

## 2025-09-23 ENCOUNTER — APPOINTMENT (OUTPATIENT)
Dept: PRIMARY CARE | Facility: CLINIC | Age: OVER 89
End: 2025-09-23
Payer: MEDICARE

## 2025-11-13 ENCOUNTER — APPOINTMENT (OUTPATIENT)
Dept: CARDIOLOGY | Facility: HOSPITAL | Age: OVER 89
End: 2025-11-13
Payer: MEDICARE